# Patient Record
Sex: FEMALE | Race: WHITE | HISPANIC OR LATINO | Employment: UNEMPLOYED | URBAN - METROPOLITAN AREA
[De-identification: names, ages, dates, MRNs, and addresses within clinical notes are randomized per-mention and may not be internally consistent; named-entity substitution may affect disease eponyms.]

---

## 2018-02-25 ENCOUNTER — HOSPITAL ENCOUNTER (EMERGENCY)
Facility: MEDICAL CENTER | Age: 23
End: 2018-02-25
Attending: EMERGENCY MEDICINE

## 2018-02-25 ENCOUNTER — APPOINTMENT (OUTPATIENT)
Dept: RADIOLOGY | Facility: MEDICAL CENTER | Age: 23
End: 2018-02-25
Attending: EMERGENCY MEDICINE

## 2018-02-25 VITALS
WEIGHT: 217.81 LBS | HEIGHT: 66 IN | DIASTOLIC BLOOD PRESSURE: 80 MMHG | OXYGEN SATURATION: 100 % | SYSTOLIC BLOOD PRESSURE: 110 MMHG | BODY MASS INDEX: 35.01 KG/M2 | TEMPERATURE: 98 F | HEART RATE: 97 BPM | RESPIRATION RATE: 16 BRPM

## 2018-02-25 DIAGNOSIS — O09.90 AT HIGH RISK FOR COMPLICATIONS OF INTRAUTERINE PREGNANCY (IUP): ICD-10-CM

## 2018-02-25 DIAGNOSIS — O20.0 ABORTION, THREATENED: ICD-10-CM

## 2018-02-25 LAB
ALBUMIN SERPL BCP-MCNC: 4 G/DL (ref 3.2–4.9)
ALBUMIN/GLOB SERPL: 1.3 G/DL
ALP SERPL-CCNC: 64 U/L (ref 30–99)
ALT SERPL-CCNC: 12 U/L (ref 2–50)
ANION GAP SERPL CALC-SCNC: 8 MMOL/L (ref 0–11.9)
APPEARANCE UR: CLEAR
AST SERPL-CCNC: 15 U/L (ref 12–45)
B-HCG SERPL-ACNC: 2746 MIU/ML (ref 0–5)
BACTERIA #/AREA URNS HPF: NEGATIVE /HPF
BASOPHILS # BLD AUTO: 0.4 % (ref 0–1.8)
BASOPHILS # BLD: 0.05 K/UL (ref 0–0.12)
BILIRUB SERPL-MCNC: 0.2 MG/DL (ref 0.1–1.5)
BILIRUB UR QL STRIP.AUTO: NEGATIVE
BUN SERPL-MCNC: 12 MG/DL (ref 8–22)
CALCIUM SERPL-MCNC: 9.1 MG/DL (ref 8.5–10.5)
CHLORIDE SERPL-SCNC: 100 MMOL/L (ref 96–112)
CO2 SERPL-SCNC: 27 MMOL/L (ref 20–33)
COLOR UR: YELLOW
CREAT SERPL-MCNC: 0.6 MG/DL (ref 0.5–1.4)
EOSINOPHIL # BLD AUTO: 0.28 K/UL (ref 0–0.51)
EOSINOPHIL NFR BLD: 2.4 % (ref 0–6.9)
EPI CELLS #/AREA URNS HPF: ABNORMAL /HPF
ERYTHROCYTE [DISTWIDTH] IN BLOOD BY AUTOMATED COUNT: 43.6 FL (ref 35.9–50)
GLOBULIN SER CALC-MCNC: 3.2 G/DL (ref 1.9–3.5)
GLUCOSE SERPL-MCNC: 100 MG/DL (ref 65–99)
GLUCOSE UR STRIP.AUTO-MCNC: NEGATIVE MG/DL
HCT VFR BLD AUTO: 44.1 % (ref 37–47)
HGB BLD-MCNC: 14.6 G/DL (ref 12–16)
HYALINE CASTS #/AREA URNS LPF: ABNORMAL /LPF
IMM GRANULOCYTES # BLD AUTO: 0.04 K/UL (ref 0–0.11)
IMM GRANULOCYTES NFR BLD AUTO: 0.3 % (ref 0–0.9)
KETONES UR STRIP.AUTO-MCNC: NEGATIVE MG/DL
LEUKOCYTE ESTERASE UR QL STRIP.AUTO: ABNORMAL
LYMPHOCYTES # BLD AUTO: 3.82 K/UL (ref 1–4.8)
LYMPHOCYTES NFR BLD: 32.4 % (ref 22–41)
MCH RBC QN AUTO: 29.8 PG (ref 27–33)
MCHC RBC AUTO-ENTMCNC: 33.1 G/DL (ref 33.6–35)
MCV RBC AUTO: 90 FL (ref 81.4–97.8)
MICRO URNS: ABNORMAL
MONOCYTES # BLD AUTO: 0.73 K/UL (ref 0–0.85)
MONOCYTES NFR BLD AUTO: 6.2 % (ref 0–13.4)
NEUTROPHILS # BLD AUTO: 6.88 K/UL (ref 2–7.15)
NEUTROPHILS NFR BLD: 58.3 % (ref 44–72)
NITRITE UR QL STRIP.AUTO: NEGATIVE
NRBC # BLD AUTO: 0 K/UL
NRBC BLD-RTO: 0 /100 WBC
NUMBER OF RH DOSES IND 8505RD: NORMAL
PH UR STRIP.AUTO: 7 [PH]
PLATELET # BLD AUTO: 262 K/UL (ref 164–446)
PMV BLD AUTO: 9.7 FL (ref 9–12.9)
POTASSIUM SERPL-SCNC: 4.3 MMOL/L (ref 3.6–5.5)
PROT SERPL-MCNC: 7.2 G/DL (ref 6–8.2)
PROT UR QL STRIP: NEGATIVE MG/DL
RBC # BLD AUTO: 4.9 M/UL (ref 4.2–5.4)
RBC # URNS HPF: ABNORMAL /HPF
RBC UR QL AUTO: NEGATIVE
RH BLD: NORMAL
SODIUM SERPL-SCNC: 135 MMOL/L (ref 135–145)
SP GR UR STRIP.AUTO: 1.01
UROBILINOGEN UR STRIP.AUTO-MCNC: 0.2 MG/DL
WBC # BLD AUTO: 11.8 K/UL (ref 4.8–10.8)
WBC #/AREA URNS HPF: ABNORMAL /HPF

## 2018-02-25 PROCEDURE — 81001 URINALYSIS AUTO W/SCOPE: CPT

## 2018-02-25 PROCEDURE — 76817 TRANSVAGINAL US OBSTETRIC: CPT

## 2018-02-25 PROCEDURE — 86901 BLOOD TYPING SEROLOGIC RH(D): CPT

## 2018-02-25 PROCEDURE — 36415 COLL VENOUS BLD VENIPUNCTURE: CPT

## 2018-02-25 PROCEDURE — 80053 COMPREHEN METABOLIC PANEL: CPT

## 2018-02-25 PROCEDURE — 84702 CHORIONIC GONADOTROPIN TEST: CPT

## 2018-02-25 PROCEDURE — 85025 COMPLETE CBC W/AUTO DIFF WBC: CPT

## 2018-02-25 PROCEDURE — 99284 EMERGENCY DEPT VISIT MOD MDM: CPT

## 2018-02-25 ASSESSMENT — LIFESTYLE VARIABLES: DO YOU DRINK ALCOHOL: NO

## 2018-02-25 NOTE — DISCHARGE INSTRUCTIONS
Threatened Miscarriage  A threatened miscarriage occurs when you have vaginal bleeding during your first 20 weeks of pregnancy but the pregnancy has not ended. If you have vaginal bleeding during this time, your health care provider will do tests to make sure you are still pregnant. If the tests show you are still pregnant and the developing baby (fetus) inside your womb (uterus) is still growing, your condition is considered a threatened miscarriage.  A threatened miscarriage does not mean your pregnancy will end, but it does increase the risk of losing your pregnancy (complete miscarriage).  CAUSES   The cause of a threatened miscarriage is usually not known. If you go on to have a complete miscarriage, the most common cause is an abnormal number of chromosomes in the developing baby. Chromosomes are the structures inside cells that hold all your genetic material.  Some causes of vaginal bleeding that do not result in miscarriage include:  · Having sex.  · Having an infection.  · Normal hormone changes of pregnancy.  · Bleeding that occurs when an egg implants in your uterus.  RISK FACTORS  Risk factors for bleeding in early pregnancy include:  · Obesity.  · Smoking.  · Drinking excessive amounts of alcohol or caffeine.  · Recreational drug use.  SIGNS AND SYMPTOMS  · Light vaginal bleeding.  · Mild abdominal pain or cramps.  DIAGNOSIS   If you have bleeding with or without abdominal pain before 20 weeks of pregnancy, your health care provider will do tests to check whether you are still pregnant. One important test involves using sound waves and a computer (ultrasound) to create images of the inside of your uterus. Other tests include an internal exam of your vagina and uterus (pelvic exam) and measurement of your baby's heart rate.   You may be diagnosed with a threatened miscarriage if:  · Ultrasound testing shows you are still pregnant.  · Your baby's heart rate is strong.  · A pelvic exam shows that the  opening between your uterus and your vagina (cervix) is closed.  · Your heart rate and blood pressure are stable.  · Blood tests confirm you are still pregnant.  TREATMENT   No treatments have been shown to prevent a threatened miscarriage from going on to a complete miscarriage. However, the right home care is important.   HOME CARE INSTRUCTIONS   · Make sure you keep all your appointments for prenatal care. This is very important.  · Get plenty of rest.  · Do not have sex or use tampons if you have vaginal bleeding.  · Do not douche.  · Do not smoke or use recreational drugs.  · Do not drink alcohol.  · Avoid caffeine.  SEEK MEDICAL CARE IF:  · You have light vaginal bleeding or spotting while pregnant.  · You have abdominal pain or cramping.  · You have a fever.  SEEK IMMEDIATE MEDICAL CARE IF:  · You have heavy vaginal bleeding.  · You have blood clots coming from your vagina.  · You have severe low back pain or abdominal cramps.  · You have fever, chills, and severe abdominal pain.  MAKE SURE YOU:  · Understand these instructions.  · Will watch your condition.  · Will get help right away if you are not doing well or get worse.     This information is not intended to replace advice given to you by your health care provider. Make sure you discuss any questions you have with your health care provider.     Document Released: 2006 Document Revised: 2014 Document Reviewed: 10/14/2014  [employer] Interactive Patient Education ©6 [employer]    Pelvic Rest  Pelvic rest is sometimes recommended for women when:   · The placenta is partially or completely covering the opening of the cervix (placenta previa).  · There is bleeding between the uterine wall and the amniotic sac in the first trimester (subchorionic hemorrhage).  · The cervix begins to open without labor starting (incompetent cervix, cervical insufficiency).  · The labor is too early ( labor).  HOME CARE INSTRUCTIONS  · Do not have sexual  intercourse, stimulation, or an orgasm.  · Do not use tampons, douche, or put anything in the vagina.  · Do not lift anything over 10 pounds (4.5 kg).  · Avoid strenuous activity or straining your pelvic muscles.  SEEK MEDICAL CARE IF:   · You have any vaginal bleeding during pregnancy. Treat this as a potential emergency.  · You have cramping pain felt low in the stomach (stronger than menstrual cramps).  · You notice vaginal discharge (watery, mucus, or bloody).  · You have a low, dull backache.  · There are regular contractions or uterine tightening.  SEEK IMMEDIATE MEDICAL CARE IF:  You have vaginal bleeding and have placenta previa.      This information is not intended to replace advice given to you by your health care provider. Make sure you discuss any questions you have with your health care provider.     Document Released: 04/13/2012 Document Revised: 03/11/2013 Document Reviewed: 04/13/2012  [employer] Interactive Patient Education ©2016 [employer]

## 2018-02-25 NOTE — ED NOTES
Pt reporting cramps, spotting. Took pregnancy test yesterday which was positive. LMP 1/18/2018.   Call light within reach. Will continue to monitor.

## 2018-02-25 NOTE — ED TRIAGE NOTES
"Chief Complaint   Patient presents with   • Pregnancy     found out she's pregnant yesterday from home tests   • Cramps     started yesterday   • Spotting     Pt ambulatory to triage w/ no assistance. Pt does not appear in distress. Pt states she is . Pt placed back in lobby at this time.    Blood pressure 143/98, pulse 99, temperature 36.7 °C (98 °F), resp. rate 18, height 1.676 m (5' 6\"), weight 98.8 kg (217 lb 13 oz), SpO2 98 %, unknown if currently breastfeeding.      "

## 2018-02-25 NOTE — ED PROVIDER NOTES
ED Provider Note    Scribed for Anthony uD M.D. by Tucker Sood. 2018, 2:08 AM.    Primary care provider: Pcp Pt States None  Means of arrival: Walk-In  History obtained from: Patient  History limited by: None    CHIEF COMPLAINT  Chief Complaint   Patient presents with   • Pregnancy     found out she's pregnant yesterday from home tests   • Cramps     started yesterday   • Spotting     HPI  Sharyn Peguero is a 22 y.o. female who presents to the Emergency Department complaining of abdominal cramping onset 1 day ago. The patient's pregnancy is normally on the 18 so she was concerned and took her pregnancy test yesterday night and discovered she is pregnant. Patient started to develop spotting today and is still complaining of cramping. She used Tylenol earlier which did provide mild relief. Her periods were previously regular for the past few months. She has 3 kids and is . The patient had an issue during her last pregnancy where she was really itchy and labor was induced 2 weeks early without issues. Her IUD was removed 3 weeks ago and has had unprotected sex since then without any birth control. She does not currently follow-up with OB-GYN but [facility].    REVIEW OF SYSTEMS  See HPI for further details. All other systems are negative.    C.    PAST MEDICAL HISTORY   has a past medical history of Depression (); Migraine (never Dx'd); Migraine (); N&V (nausea and vomiting); Ovarian cyst (13); and Uterine size-date discrepancy (2011).    SURGICAL HISTORY  patient denies any surgical history    SOCIAL HISTORY  Social History   Substance Use Topics   • Smoking status: Never Smoker   • Smokeless tobacco: Not on file   • Alcohol use No      History   Drug Use No     FAMILY HISTORY  Family History   Problem Relation Age of Onset   • Diabetes Mother      insulin   • Hyperlipidemia Father    • Hypertension Father    • Hypertension Maternal Aunt    • Diabetes  "Maternal Aunt      pills   • Diabetes Maternal Grandmother      pills   • Hypertension Paternal Grandmother    • Hyperlipidemia Paternal Grandmother      CURRENT MEDICATIONS  Reviewed.  See Encounter Summary.     ALLERGIES  Allergies   Allergen Reactions   • Nkda [No Known Drug Allergy]      PHYSICAL EXAM  VITAL SIGNS: /98   Pulse 99   Temp 36.7 °C (98 °F)   Resp 18   Ht 1.676 m (5' 6\")   Wt 98.8 kg (217 lb 13 oz)   LMP 01/18/2017 (Exact Date)   SpO2 98%   BMI 35.16 kg/m²   Constitutional: Alert in mild apparent distress.  HENT: No signs of trauma, Bilateral external ears normal, Nose normal.   Eyes: Pupils are equal and reactive, Conjunctiva normal, Non-icteric.   Neck: Normal range of motion, No tenderness, Supple, No stridor.   Lymphatic: No lymphadenopathy noted.   Cardiovascular: Regular rate and rhythm, no murmurs.   Thorax & Lungs: Normal breath sounds, No respiratory distress, No wheezing, No chest tenderness.   Abdomen: Bowel sounds normal, Overweight, No tenderness, No masses, No pulsatile masses. No peritoneal signs.  Skin: Warm, Dry, No erythema, No rash.   Back: No bony tenderness, No CVA tenderness.   Extremities: Intact distal pulses, No edema, No tenderness, No cyanosis  Musculoskeletal: Good range of motion in all major joints. No tenderness to palpation or major deformities noted.   Neurologic: Alert , Normal motor function, Normal sensory function, No focal deficits noted.   Psychiatric: Affect normal, Judgment normal, Mood normal.     DIAGNOSTIC STUDIES / PROCEDURES     LABS  Results for orders placed or performed during the hospital encounter of 02/25/18   CBC WITH DIFFERENTIAL   Result Value Ref Range    WBC 11.8 (H) 4.8 - 10.8 K/uL    RBC 4.90 4.20 - 5.40 M/uL    Hemoglobin 14.6 12.0 - 16.0 g/dL    Hematocrit 44.1 37.0 - 47.0 %    MCV 90.0 81.4 - 97.8 fL    MCH 29.8 27.0 - 33.0 pg    MCHC 33.1 (L) 33.6 - 35.0 g/dL    RDW 43.6 35.9 - 50.0 fL    Platelet Count 262 164 - 446 K/uL    " MPV 9.7 9.0 - 12.9 fL    Neutrophils-Polys 58.30 44.00 - 72.00 %    Lymphocytes 32.40 22.00 - 41.00 %    Monocytes 6.20 0.00 - 13.40 %    Eosinophils 2.40 0.00 - 6.90 %    Basophils 0.40 0.00 - 1.80 %    Immature Granulocytes 0.30 0.00 - 0.90 %    Nucleated RBC 0.00 /100 WBC    Neutrophils (Absolute) 6.88 2.00 - 7.15 K/uL    Lymphs (Absolute) 3.82 1.00 - 4.80 K/uL    Monos (Absolute) 0.73 0.00 - 0.85 K/uL    Eos (Absolute) 0.28 0.00 - 0.51 K/uL    Baso (Absolute) 0.05 0.00 - 0.12 K/uL    Immature Granulocytes (abs) 0.04 0.00 - 0.11 K/uL    NRBC (Absolute) 0.00 K/uL   COMP METABOLIC PANEL   Result Value Ref Range    Sodium 135 135 - 145 mmol/L    Potassium 4.3 3.6 - 5.5 mmol/L    Chloride 100 96 - 112 mmol/L    Co2 27 20 - 33 mmol/L    Anion Gap 8.0 0.0 - 11.9    Glucose 100 (H) 65 - 99 mg/dL    Bun 12 8 - 22 mg/dL    Creatinine 0.60 0.50 - 1.40 mg/dL    Calcium 9.1 8.5 - 10.5 mg/dL    AST(SGOT) 15 12 - 45 U/L    ALT(SGPT) 12 2 - 50 U/L    Alkaline Phosphatase 64 30 - 99 U/L    Total Bilirubin 0.2 0.1 - 1.5 mg/dL    Albumin 4.0 3.2 - 4.9 g/dL    Total Protein 7.2 6.0 - 8.2 g/dL    Globulin 3.2 1.9 - 3.5 g/dL    A-G Ratio 1.3 g/dL   URINALYSIS (UA)   Result Value Ref Range    Color Yellow     Character Clear     Specific Gravity 1.009 <1.035    Ph 7.0 5.0 - 8.0    Glucose Negative Negative mg/dL    Ketones Negative Negative mg/dL    Protein Negative Negative mg/dL    Bilirubin Negative Negative    Urobilinogen, Urine 0.2 Negative    Nitrite Negative Negative    Leukocyte Esterase Small (A) Negative    Occult Blood Negative Negative    Micro Urine Req Microscopic    RH TYPE FOR RHOGAM FROM E.D.   Result Value Ref Range    Emergency Department Rh Typing POS     Number Of Rh Doses Indicated ZERO    HCG QUANTITATIVE SERUM   Result Value Ref Range    Bhcg 2746.0 (H) 0.0 - 5.0 mIU/mL   ESTIMATED GFR   Result Value Ref Range    GFR If African American >60 >60 mL/min/1.73 m 2    GFR If Non African American >60 >60 mL/min/1.73  m 2   URINE MICROSCOPIC (W/UA)   Result Value Ref Range    WBC 5-10 (A) /hpf    RBC 0-2 /hpf    Bacteria Negative None /hpf    Epithelial Cells Few /hpf    Hyaline Cast 0-2 /lpf     All labs were reviewed by me.    RADIOLOGY  US-OB PELVIS TRANSVAGINAL   Final Result      1.  Single intrauterine gestational sac containing a yolk sac. No embryonic pole identified.      2.  Menstrual age based on mean sac diameter is 5 weeks 3 days with ultrasound K[facility]A 10/25/2018. Follow-up ultrasound surveillance is recommended to confirm living IUP.      3.  Normal appearance of each ovary.      4.  No adnexal mass or free fluid.        The radiologist's interpretation of all radiological studies and images have been reviewed by me.    COURSE & MEDICAL DECISION MAKING  Pertinent Labs & Imaging studies reviewed. (See chart for details)    Differential diagnoses include but are not limited to: Early Pregnancy, IUP vs. Ectopic vs. Threatened     2:08 AM - Patient seen and examined at bedside. Ordered RH Type, HCG Quantitative Serum, CBC, CMP, Urinalysis to evaluate her symptoms.     Decision Making:  This is a 22 y.o. year old female who presents with above complaint. By definition the patient does have a threatened miscarriage. She denies having a need for serial ultrasound and beta hCGs to trend. She is understanding of pelvic rest. She is understanding of return precautions here to the ER including that for ability of serial testing. Patient has been referred back to pregnancy center where she has previously been seen for her ObGyn care needs.    FINAL IMPRESSION  1. , threatened    2. At high risk for complications of intrauterine pregnancy (IUP)          Tucker GRACE ([place]), am scribing for, and in the presence of, Anthony Du M.D..    Electronically signed by: Tucker Sood ([place]), 2018    Anthony GRACE M.D. personally performed the services described in this documentation, as scribed by  Tucker Sood in my presence, and it is both accurate and complete.    The note accurately reflects work and decisions made by me.  Anthony Du  2/26/2018  4:08 AM

## 2018-02-25 NOTE — ED NOTES
Pt up to restroom with steady gait. Obtained urine sample and sent to lab. Will continue to monitor.

## 2018-03-02 ENCOUNTER — APPOINTMENT (OUTPATIENT)
Dept: OBGYN | Facility: CLINIC | Age: 23
End: 2018-03-02

## 2018-03-30 ENCOUNTER — INITIAL PRENATAL (OUTPATIENT)
Dept: OBGYN | Facility: CLINIC | Age: 23
End: 2018-03-30

## 2018-03-30 ENCOUNTER — HOSPITAL ENCOUNTER (OUTPATIENT)
Facility: MEDICAL CENTER | Age: 23
End: 2018-03-30
Attending: NURSE PRACTITIONER
Payer: COMMERCIAL

## 2018-03-30 VITALS
WEIGHT: 231 LBS | HEIGHT: 66 IN | SYSTOLIC BLOOD PRESSURE: 112 MMHG | BODY MASS INDEX: 37.12 KG/M2 | DIASTOLIC BLOOD PRESSURE: 70 MMHG

## 2018-03-30 DIAGNOSIS — Z34.81 ENCOUNTER FOR SUPERVISION OF OTHER NORMAL PREGNANCY, FIRST TRIMESTER: ICD-10-CM

## 2018-03-30 DIAGNOSIS — Z34.81 ENCOUNTER FOR SUPERVISION OF OTHER NORMAL PREGNANCY, FIRST TRIMESTER: Primary | ICD-10-CM

## 2018-03-30 DIAGNOSIS — O09.291 HISTORY OF MACROSOMIA IN INFANT IN PRIOR PREGNANCY, CURRENTLY PREGNANT IN FIRST TRIMESTER: ICD-10-CM

## 2018-03-30 PROCEDURE — 59401 PR NEW OB VISIT: CPT | Performed by: NURSE PRACTITIONER

## 2018-03-30 ASSESSMENT — PATIENT HEALTH QUESTIONNAIRE - PHQ9
5. POOR APPETITE OR OVEREATING: 1 - SEVERAL DAYS
CLINICAL INTERPRETATION OF PHQ2 SCORE: 1

## 2018-03-30 NOTE — PATIENT INSTRUCTIONS
P:  1.  GC/CT done. Pap records requested, WNL per pt 2017.         2.  Prenatal labs, including 1hr GTT ordered - lab slip given        3.  Discussed PNV, diet, and adequate water intake        4.  NOB packet given        5.  Return to office in 4 wks        6.  First trimester screening declined.         7.  Flu vaccine declined.         8.  Complete OB US in 10wks

## 2018-03-30 NOTE — PROGRESS NOTES
NOB visit today  Ob f/u.    No VB, LOF or contractions  Yellowish discharge. Pt denies odor/burning/itching  C/O cramping x 1 week   Phone number # [phone]  Pharmacy verified with patient  WT=   231 lbs           MM=170/70  Pt is not working, not around chemical or lifting  Baby wasn't planned. FOB is not involved. Mom is happy with the baby   AFP ordered today, PNP

## 2018-03-30 NOTE — PROGRESS NOTES
"S:  Sharyn Mejias is a 22 y.o.   @ EGA: 10w1d KIRA: Estimated Date of Delivery: 10/25/18  per US who presents for her new OB exam.  She has no complaints.  Desires AFP.  Declines CF.  Reports no FM, VB, LOF, or cramping.  Denies dysuria, vaginal DC.  Pt is single and lives with friend and children. Not presently working outside the home.  Pregnancy is unplanned but wanted.  Declines Centering Pregnancy.    O:    Vitals:    18 1253   BP: 112/70   Weight: 104.8 kg (231 lb)   Height: 1.676 m (5' 6\")    See H&P Prenatal Physical.  Wet mount: deferred        FHTs: 160, +CM and +FM on BSUS        Fundal ht: 10     A:   1.  IUP @ 10w1d KIRA: Estimated Date of Delivery: 10/25/18 per [facility]         2.  S=D        3.    Patient Active Problem List    Diagnosis Date Noted   • Encounter for supervision of other normal pregnancy, first trimester 2018   • History of macrosomia in infant in prior pregnancy, currently pregnant in first trimester 2018   • Depression 2012   • Migraine          P:  1.  GC/CT done. Pap records requested, WNL per pt 2017.         2.  Prenatal labs, including 1hr GTT ordered - lab slip given        3.  Discussed PNV, diet, and adequate water intake        4.  NOB packet given        5.  Return to office in 4 wks        6.  First trimester screening declined.         7.  Flu vaccine declined.         8.  Complete OB US in 10wks    "

## 2018-03-31 LAB
C TRACH DNA SPEC QL NAA+PROBE: POSITIVE
N GONORRHOEA DNA SPEC QL NAA+PROBE: NEGATIVE
SPECIMEN SOURCE: ABNORMAL

## 2018-04-02 ENCOUNTER — TELEPHONE (OUTPATIENT)
Dept: OBGYN | Facility: CLINIC | Age: 23
End: 2018-04-02

## 2018-04-02 DIAGNOSIS — A74.9 CHLAMYDIA: ICD-10-CM

## 2018-04-02 RX ORDER — AZITHROMYCIN 500 MG/1
1000 TABLET, FILM COATED ORAL ONCE
Qty: 2 TAB | Refills: 0 | Status: SHIPPED | OUTPATIENT
Start: 2018-04-02 | End: 2018-04-02

## 2018-04-02 NOTE — TELEPHONE ENCOUNTER
Notes Recorded by VINITA Conrad on 4/2/2018 at 8:10 AM PDT  +Chlamydia, no pharmacy on file will need to come in and  RX for azithromycin, also have patient inform partner for follow up at [facility]    Unable to contact pt msg left to call back with Mariama.    4/3/18 @ 3330  Pt notified regarding positive chlamydia and need to  Rx from her pharmacy and Advised pt make a note of the date she took medication because she needs to be retested 4 wks after she had taken meds. Advised for her partner to be treated with his PCP or [facility]. Pt verbalized understanding.   [facility] form and lab results faxed to Karen at [facility].

## 2018-04-03 RX ORDER — AZITHROMYCIN 500 MG/1
500 TABLET, FILM COATED ORAL ONCE
Qty: 2 TAB | Refills: 0 | Status: SHIPPED | OUTPATIENT
Start: 2018-04-03 | End: 2018-04-03

## 2018-04-04 ENCOUNTER — TELEPHONE (OUTPATIENT)
Dept: OBGYN | Facility: CLINIC | Age: 23
End: 2018-04-04

## 2018-04-04 NOTE — TELEPHONE ENCOUNTER
Pt came in to clinic today, states she doesn't have money to get medication for chlamydia, states she called [facility] and was told they can not treat her because she is our pt, states why she can not get treated since they always get treatment for free. I called Karen @ [facility] and let msg with pt situation.    Pt states that she was going go back to [facility] to see if she gets treated.

## 2018-04-16 ENCOUNTER — HOSPITAL ENCOUNTER (EMERGENCY)
Facility: MEDICAL CENTER | Age: 23
End: 2018-04-16
Attending: EMERGENCY MEDICINE

## 2018-04-16 ENCOUNTER — APPOINTMENT (OUTPATIENT)
Dept: RADIOLOGY | Facility: MEDICAL CENTER | Age: 23
End: 2018-04-16
Attending: EMERGENCY MEDICINE

## 2018-04-16 VITALS
SYSTOLIC BLOOD PRESSURE: 112 MMHG | HEART RATE: 90 BPM | BODY MASS INDEX: 37.22 KG/M2 | RESPIRATION RATE: 16 BRPM | TEMPERATURE: 97.9 F | DIASTOLIC BLOOD PRESSURE: 76 MMHG | OXYGEN SATURATION: 99 % | WEIGHT: 230.6 LBS

## 2018-04-16 DIAGNOSIS — N93.9 VAGINAL BLEEDING: ICD-10-CM

## 2018-04-16 DIAGNOSIS — O20.0 THREATENED MISCARRIAGE: ICD-10-CM

## 2018-04-16 LAB
ALBUMIN SERPL BCP-MCNC: 3.5 G/DL (ref 3.2–4.9)
ALBUMIN/GLOB SERPL: 1 G/DL
ALP SERPL-CCNC: 56 U/L (ref 30–99)
ALT SERPL-CCNC: 13 U/L (ref 2–50)
ANION GAP SERPL CALC-SCNC: 7 MMOL/L (ref 0–11.9)
APPEARANCE UR: ABNORMAL
AST SERPL-CCNC: 14 U/L (ref 12–45)
B-HCG SERPL-ACNC: ABNORMAL MIU/ML (ref 0–5)
BACTERIA #/AREA URNS HPF: ABNORMAL /HPF
BASOPHILS # BLD AUTO: 0.4 % (ref 0–1.8)
BASOPHILS # BLD: 0.04 K/UL (ref 0–0.12)
BILIRUB SERPL-MCNC: 0.3 MG/DL (ref 0.1–1.5)
BILIRUB UR QL STRIP.AUTO: NEGATIVE
BUN SERPL-MCNC: 10 MG/DL (ref 8–22)
CALCIUM SERPL-MCNC: 9.2 MG/DL (ref 8.5–10.5)
CHLORIDE SERPL-SCNC: 104 MMOL/L (ref 96–112)
CO2 SERPL-SCNC: 25 MMOL/L (ref 20–33)
COLOR UR: YELLOW
CREAT SERPL-MCNC: 0.51 MG/DL (ref 0.5–1.4)
CULTURE IF INDICATED INDCX: YES UA CULTURE
EOSINOPHIL # BLD AUTO: 0.18 K/UL (ref 0–0.51)
EOSINOPHIL NFR BLD: 1.9 % (ref 0–6.9)
EPI CELLS #/AREA URNS HPF: ABNORMAL /HPF
ERYTHROCYTE [DISTWIDTH] IN BLOOD BY AUTOMATED COUNT: 42.4 FL (ref 35.9–50)
GLOBULIN SER CALC-MCNC: 3.4 G/DL (ref 1.9–3.5)
GLUCOSE SERPL-MCNC: 73 MG/DL (ref 65–99)
GLUCOSE UR STRIP.AUTO-MCNC: NEGATIVE MG/DL
HCT VFR BLD AUTO: 42.7 % (ref 37–47)
HGB BLD-MCNC: 14.1 G/DL (ref 12–16)
HYALINE CASTS #/AREA URNS LPF: ABNORMAL /LPF
IMM GRANULOCYTES # BLD AUTO: 0.03 K/UL (ref 0–0.11)
IMM GRANULOCYTES NFR BLD AUTO: 0.3 % (ref 0–0.9)
INR PPP: 0.92 (ref 0.87–1.13)
KETONES UR STRIP.AUTO-MCNC: NEGATIVE MG/DL
LEUKOCYTE ESTERASE UR QL STRIP.AUTO: ABNORMAL
LYMPHOCYTES # BLD AUTO: 2.23 K/UL (ref 1–4.8)
LYMPHOCYTES NFR BLD: 24 % (ref 22–41)
MCH RBC QN AUTO: 29.3 PG (ref 27–33)
MCHC RBC AUTO-ENTMCNC: 33 G/DL (ref 33.6–35)
MCV RBC AUTO: 88.6 FL (ref 81.4–97.8)
MICRO URNS: ABNORMAL
MONOCYTES # BLD AUTO: 0.42 K/UL (ref 0–0.85)
MONOCYTES NFR BLD AUTO: 4.5 % (ref 0–13.4)
NEUTROPHILS # BLD AUTO: 6.4 K/UL (ref 2–7.15)
NEUTROPHILS NFR BLD: 68.9 % (ref 44–72)
NITRITE UR QL STRIP.AUTO: NEGATIVE
NRBC # BLD AUTO: 0 K/UL
NRBC BLD-RTO: 0 /100 WBC
PH UR STRIP.AUTO: 6 [PH]
PLATELET # BLD AUTO: 256 K/UL (ref 164–446)
PMV BLD AUTO: 10.4 FL (ref 9–12.9)
POTASSIUM SERPL-SCNC: 3.8 MMOL/L (ref 3.6–5.5)
PROT SERPL-MCNC: 6.9 G/DL (ref 6–8.2)
PROT UR QL STRIP: NEGATIVE MG/DL
PROTHROMBIN TIME: 12.1 SEC (ref 12–14.6)
RBC # BLD AUTO: 4.82 M/UL (ref 4.2–5.4)
RBC # URNS HPF: ABNORMAL /HPF
RBC UR QL AUTO: NEGATIVE
SODIUM SERPL-SCNC: 136 MMOL/L (ref 135–145)
SP GR UR STRIP.AUTO: 1.02
UROBILINOGEN UR STRIP.AUTO-MCNC: 0.2 MG/DL
WBC # BLD AUTO: 9.3 K/UL (ref 4.8–10.8)
WBC #/AREA URNS HPF: ABNORMAL /HPF

## 2018-04-16 PROCEDURE — 81001 URINALYSIS AUTO W/SCOPE: CPT

## 2018-04-16 PROCEDURE — 80053 COMPREHEN METABOLIC PANEL: CPT

## 2018-04-16 PROCEDURE — 84702 CHORIONIC GONADOTROPIN TEST: CPT

## 2018-04-16 PROCEDURE — 99284 EMERGENCY DEPT VISIT MOD MDM: CPT

## 2018-04-16 PROCEDURE — 76817 TRANSVAGINAL US OBSTETRIC: CPT

## 2018-04-16 PROCEDURE — 85610 PROTHROMBIN TIME: CPT

## 2018-04-16 PROCEDURE — 85025 COMPLETE CBC W/AUTO DIFF WBC: CPT

## 2018-04-16 PROCEDURE — 87086 URINE CULTURE/COLONY COUNT: CPT

## 2018-04-16 ASSESSMENT — PAIN SCALES - GENERAL: PAINLEVEL_OUTOF10: 8

## 2018-04-16 NOTE — ED PROVIDER NOTES
ED Provider Note  CHIEF COMPLAINT  Vaginal bleeding and lower abdominal cramps.    HPI  Sharyn Mejias is a 22 y.o. female who presents for evaluation of lower abdominal cramps and vaginal bleeding. She is pregnant. She's had an evaluation for the same issue several weeks ago. She has an intrauterine pregnancy at that time of about 5 weeks. She is being followed at the [facility]. No headache, no chest pain, no difficulty breathing. She has a good appetite. I reviewed her previous records and her Rh is positive. She was pregnant on last visit several weeks ago. She was 5 weeks pregnant at that time.    REVIEW OF SYSTEMS  No headache, no chest pain, no difficulty breathing.  ALL OTHER SYSTEMS NEGATIVE    ALLERGIES  Allergies   Allergen Reactions   • Nkda [No Known Drug Allergy]        CURRENT MEDICATIONS  No current medication    PAST MEDICAL HISTORY  Past Medical History:   Diagnosis Date   • Depression 2012    postpartum depression   • Migraine never Dx'd    Tylenol - helps sometimes butnot always.   • Migraine 2010    during pregnancy    • N&V (nausea and vomiting)    • Ovarian cyst 6/25/13   • Uterine size-date discrepancy 8/31/2011       FAMILY HISTORY  Family History   Problem Relation Age of Onset   • Diabetes Mother      insulin   • Hyperlipidemia Father    • Hypertension Father    • Hypertension Maternal Aunt    • Diabetes Maternal Aunt      pills   • Diabetes Maternal Grandmother      pills   • Hypertension Paternal Grandmother    • Hyperlipidemia Paternal Grandmother        SOCIAL HISTORY   at the bedside.    PHYSICAL EXAM  GENERAL: Alert obese  female  VITAL SIGNS: /69   Pulse 91   Temp 36.6 °C (97.9 °F) (Temporal)   Resp 16   Wt 104.6 kg (230 lb 9.6 oz)   LMP 01/18/2017 (Exact Date)   SpO2 100%   BMI 37.22 kg/m²   Constitutional: Alert obese  female adult   HENT: Scalp is normal size and nontender. Ears are clear. Nose is clear. Throat is  clear with no stridor no drooling no trismus. Teeth are all intact.  Eyes: Pupils equal round and reactive to light, extraocular motor fall. There is no scleral icterus.  Neck: Neck is supple and nontender. There is no meningismus. No adenitis. No thyromegaly.  Lymphatic: No adenopathy.   Cardiovascular: Heart regular rhythm without murmurs or gallops   Thorax & Lungs: No chest wall tenderness. Lungs are clear. Patient has good breath sounds bilateral. No rales, no rhonchi, no wheezes.  Abdomen: Abdomen is soft, nontender, not rigid, no guarding, and no organomegaly. There is no palpable hernia   Skin: Warm, pink, and dry with no erythema and no rash.   Back: Nontender, no midline bony tenderness, no flank tenderness.  Pelvic examination: Cervix is soft. The uterus is about 12 weeks size. No adnexal tenderness. No uterine tenderness. No vaginal blood. Pelvic exam was performed with the nurse in attendance at the bedside.  Extremities: Full range of motion  No tenderness to palpation and no deformities noted. No calf or thigh swelling. No calf or thigh tenderness. No clinical DVT.  Neurologic: Alert & oriented . Cranial nerves are grossly intact as tested. Patient moves all 4 extremities well. Patient has good strong flexion and extension of the ankle joints knee joints hip joints and elbow joints. Sensation is normal and symmetrical in the upper and lower extremities.   Psychiatric: Patient is alert oriented coherent and rational.     RADIOLOGY/PROCEDURES  US-OB PELVIS TRANSVAGINAL   Final Result      1.  Single living intrauterine pregnancy at 12 weeks 2 days gestation by ultrasound.      2.  Small subchorionic hemorrhage.            COURSE & MEDICAL DECISION MAKING  She presents for evaluation of lower abdominal cramps and vaginal bleeding. She is pregnant. She was evaluated here several weeks ago and had a five-week intrauterine pregnancy. She is Rh+. Being followed at the pregnancy Center.    Plan: #1 IV #2  pelvic examinations with a nurse in attendance. #3 pelvic ultrasound #4. Lab evaluation including pelvic ultrasound, CBC, CMP, quantitative hCG, ProTime.    Laboratory and reexamination: Pelvic ultrasound shows a single live intrauterine pregnancy at 12 weeks. Small subchorionic hemorrhage. Urine shows large leukocyte esterase. CBC is normal with no anemia and no bandemia. Chemistry panel is normal.    Results for orders placed or performed during the hospital encounter of 04/16/18   URINALYSIS,CULTURE IF INDICATED   Result Value Ref Range    Color Yellow     Character Cloudy (A)     Specific Gravity 1.024 <1.035    Ph 6.0 5.0 - 8.0    Glucose Negative Negative mg/dL    Ketones Negative Negative mg/dL    Protein Negative Negative mg/dL    Bilirubin Negative Negative    Urobilinogen, Urine 0.2 Negative    Nitrite Negative Negative    Leukocyte Esterase Large (A) Negative    Occult Blood Negative Negative    Micro Urine Req Microscopic     Culture Indicated Yes UA Culture   URINE MICROSCOPIC (W/UA)   Result Value Ref Range    WBC Packed WBC /hpf    RBC 0-2 /hpf    Bacteria Many (A) None /hpf    Epithelial Cells Moderate (A) /hpf    Hyaline Cast 0-2 /lpf   CBC WITH DIFFERENTIAL   Result Value Ref Range    WBC 9.3 4.8 - 10.8 K/uL    RBC 4.82 4.20 - 5.40 M/uL    Hemoglobin 14.1 12.0 - 16.0 g/dL    Hematocrit 42.7 37.0 - 47.0 %    MCV 88.6 81.4 - 97.8 fL    MCH 29.3 27.0 - 33.0 pg    MCHC 33.0 (L) 33.6 - 35.0 g/dL    RDW 42.4 35.9 - 50.0 fL    Platelet Count 256 164 - 446 K/uL    MPV 10.4 9.0 - 12.9 fL    Neutrophils-Polys 68.90 44.00 - 72.00 %    Lymphocytes 24.00 22.00 - 41.00 %    Monocytes 4.50 0.00 - 13.40 %    Eosinophils 1.90 0.00 - 6.90 %    Basophils 0.40 0.00 - 1.80 %    Immature Granulocytes 0.30 0.00 - 0.90 %    Nucleated RBC 0.00 /100 WBC    Neutrophils (Absolute) 6.40 2.00 - 7.15 K/uL    Lymphs (Absolute) 2.23 1.00 - 4.80 K/uL    Monos (Absolute) 0.42 0.00 - 0.85 K/uL    Eos (Absolute) 0.18 0.00 - 0.51 K/uL     Baso (Absolute) 0.04 0.00 - 0.12 K/uL    Immature Granulocytes (abs) 0.03 0.00 - 0.11 K/uL    NRBC (Absolute) 0.00 K/uL   COMP METABOLIC PANEL   Result Value Ref Range    Sodium 136 135 - 145 mmol/L    Potassium 3.8 3.6 - 5.5 mmol/L    Chloride 104 96 - 112 mmol/L    Co2 25 20 - 33 mmol/L    Anion Gap 7.0 0.0 - 11.9    Glucose 73 65 - 99 mg/dL    Bun 10 8 - 22 mg/dL    Creatinine 0.51 0.50 - 1.40 mg/dL    Calcium 9.2 8.5 - 10.5 mg/dL    AST(SGOT) 14 12 - 45 U/L    ALT(SGPT) 13 2 - 50 U/L    Alkaline Phosphatase 56 30 - 99 U/L    Total Bilirubin 0.3 0.1 - 1.5 mg/dL    Albumin 3.5 3.2 - 4.9 g/dL    Total Protein 6.9 6.0 - 8.2 g/dL    Globulin 3.4 1.9 - 3.5 g/dL    A-G Ratio 1.0 g/dL   PROTHROMBIN TIME (INR)   Result Value Ref Range    PT 12.1 12.0 - 14.6 sec    INR 0.92 0.87 - 1.13   ESTIMATED GFR   Result Value Ref Range    GFR If African American >60 >60 mL/min/1.73 m 2    GFR If Non African American >60 >60 mL/min/1.73 m 2      A she is stable for discharge. She'll follow up at the pregnancy Center tomorrow or later on this week. Stable for discharge. He be given instructions on threatened miscarriage.  FINAL IMPRESSION  1. Threatened miscarriage  2. 12 week intrauterine pregnancy         Electronically signed by: Gary Gansert, 4/16/2018 2:15 PM

## 2018-04-16 NOTE — ED TRIAGE NOTES
Amb to triage w/ c/o low abd cramping since yesterday.  Reports spotting yesterday, resolved today.  Pt also c/o back pain x 2 days.  Pt is 11wks pregnant.  .

## 2018-04-18 LAB
BACTERIA UR CULT: NORMAL
SIGNIFICANT IND 70042: NORMAL
SITE SITE: NORMAL
SOURCE SOURCE: NORMAL

## 2018-04-25 ENCOUNTER — HOSPITAL ENCOUNTER (OUTPATIENT)
Dept: LAB | Facility: MEDICAL CENTER | Age: 23
End: 2018-04-25
Attending: NURSE PRACTITIONER
Payer: COMMERCIAL

## 2018-04-25 DIAGNOSIS — O09.291 HISTORY OF MACROSOMIA IN INFANT IN PRIOR PREGNANCY, CURRENTLY PREGNANT IN FIRST TRIMESTER: ICD-10-CM

## 2018-04-25 DIAGNOSIS — Z34.81 ENCOUNTER FOR SUPERVISION OF OTHER NORMAL PREGNANCY, FIRST TRIMESTER: ICD-10-CM

## 2018-04-25 LAB
ABO GROUP BLD: NORMAL
APPEARANCE UR: ABNORMAL
BACTERIA #/AREA URNS HPF: ABNORMAL /HPF
BASOPHILS # BLD AUTO: 0.7 % (ref 0–1.8)
BASOPHILS # BLD: 0.06 K/UL (ref 0–0.12)
BILIRUB UR QL STRIP.AUTO: NEGATIVE
BLD GP AB SCN SERPL QL: NORMAL
COLOR UR: YELLOW
EOSINOPHIL # BLD AUTO: 0.21 K/UL (ref 0–0.51)
EOSINOPHIL NFR BLD: 2.6 % (ref 0–6.9)
EPI CELLS #/AREA URNS HPF: ABNORMAL /HPF
ERYTHROCYTE [DISTWIDTH] IN BLOOD BY AUTOMATED COUNT: 42.1 FL (ref 35.9–50)
GLUCOSE 1H P 50 G GLC PO SERPL-MCNC: 142 MG/DL (ref 70–139)
GLUCOSE UR STRIP.AUTO-MCNC: NEGATIVE MG/DL
HBV SURFACE AG SER QL: NEGATIVE
HCT VFR BLD AUTO: 44.2 % (ref 37–47)
HGB BLD-MCNC: 14.5 G/DL (ref 12–16)
HIV 1+2 AB+HIV1 P24 AG SERPL QL IA: NON REACTIVE
HYALINE CASTS #/AREA URNS LPF: ABNORMAL /LPF
IMM GRANULOCYTES # BLD AUTO: 0.03 K/UL (ref 0–0.11)
IMM GRANULOCYTES NFR BLD AUTO: 0.4 % (ref 0–0.9)
KETONES UR STRIP.AUTO-MCNC: ABNORMAL MG/DL
LEUKOCYTE ESTERASE UR QL STRIP.AUTO: ABNORMAL
LYMPHOCYTES # BLD AUTO: 2.36 K/UL (ref 1–4.8)
LYMPHOCYTES NFR BLD: 29.4 % (ref 22–41)
MCH RBC QN AUTO: 29.7 PG (ref 27–33)
MCHC RBC AUTO-ENTMCNC: 32.8 G/DL (ref 33.6–35)
MCV RBC AUTO: 90.6 FL (ref 81.4–97.8)
MICRO URNS: ABNORMAL
MONOCYTES # BLD AUTO: 0.41 K/UL (ref 0–0.85)
MONOCYTES NFR BLD AUTO: 5.1 % (ref 0–13.4)
NEUTROPHILS # BLD AUTO: 4.97 K/UL (ref 2–7.15)
NEUTROPHILS NFR BLD: 61.8 % (ref 44–72)
NITRITE UR QL STRIP.AUTO: NEGATIVE
NRBC # BLD AUTO: 0 K/UL
NRBC BLD-RTO: 0 /100 WBC
PH UR STRIP.AUTO: 6.5 [PH]
PLATELET # BLD AUTO: 296 K/UL (ref 164–446)
PMV BLD AUTO: 11 FL (ref 9–12.9)
PROT UR QL STRIP: 30 MG/DL
RBC # BLD AUTO: 4.88 M/UL (ref 4.2–5.4)
RBC # URNS HPF: ABNORMAL /HPF
RBC UR QL AUTO: NEGATIVE
RH BLD: NORMAL
RPR SER QL: NON REACTIVE
RUBV AB SER QL: 44.3 IU/ML
SP GR UR STRIP.AUTO: 1.03
TREPONEMA PALLIDUM IGG+IGM AB [PRESENCE] IN SERUM OR PLASMA BY IMMUNOASSAY: REACTIVE
UROBILINOGEN UR STRIP.AUTO-MCNC: 0.2 MG/DL
WBC # BLD AUTO: 8 K/UL (ref 4.8–10.8)
WBC #/AREA URNS HPF: ABNORMAL /HPF

## 2018-04-27 ENCOUNTER — HOSPITAL ENCOUNTER (OUTPATIENT)
Facility: MEDICAL CENTER | Age: 23
End: 2018-04-27
Attending: PHYSICIAN ASSISTANT
Payer: COMMERCIAL

## 2018-04-27 ENCOUNTER — ROUTINE PRENATAL (OUTPATIENT)
Dept: OBGYN | Facility: CLINIC | Age: 23
End: 2018-04-27

## 2018-04-27 VITALS — WEIGHT: 235 LBS | DIASTOLIC BLOOD PRESSURE: 70 MMHG | BODY MASS INDEX: 37.93 KG/M2 | SYSTOLIC BLOOD PRESSURE: 116 MMHG

## 2018-04-27 DIAGNOSIS — Z34.81 ENCOUNTER FOR SUPERVISION OF OTHER NORMAL PREGNANCY, FIRST TRIMESTER: Primary | ICD-10-CM

## 2018-04-27 DIAGNOSIS — R73.09 ELEVATED GLUCOSE TOLERANCE TEST: ICD-10-CM

## 2018-04-27 DIAGNOSIS — A74.9 CHLAMYDIA: ICD-10-CM

## 2018-04-27 DIAGNOSIS — O09.291 HISTORY OF MACROSOMIA IN INFANT IN PRIOR PREGNANCY, CURRENTLY PREGNANT IN FIRST TRIMESTER: ICD-10-CM

## 2018-04-27 PROCEDURE — 90040 PR PRENATAL FOLLOW UP: CPT | Performed by: PHYSICIAN ASSISTANT

## 2018-04-27 NOTE — PROGRESS NOTES
Pt has no complaints with cramping, bleeding or pain, though pt was seen in ER 4/16 due to spotting and cramping. US done confirms KIRA but pt sent home. No problems since. Pt is having continued HA, so strongly encouraged to incr water intake. No FM yet. Pt notes she took meds for Chlamydia infection, unknown if partner did as she isnt in contact with him anymore. JUANITA done today. PNL wnl - pt notified of results and positive treponemal test, but neg RPR, so no further testing needed. Early 1hr , so 3hr GTT and AFP slip given today - pt to do after 15wk. Also, pt informed she will need another 3hr GTT at 24wk if testing neg. US scheduled June 8. RTC 4 wk or sooner prn.

## 2018-04-27 NOTE — PROGRESS NOTES
Pt here today for OB follow up  WT: 235 lb  BP: 116/70  Pt states was seen at [facility] on 04/16 due to vaginal spotting and abdominal cramping. Pt states no spotting since but still having cramping.  Pt reports having heaches and migraines.   Pt aware that her 1 hr gtt labs results are elevated and need for the 3 hr gtt, instructions given to pt today.   Pt states she took medication for Chlmydia on 04/03/18. States she has not been sexually active.  US on 06/08  Good # [phone]

## 2018-04-28 DIAGNOSIS — A74.9 CHLAMYDIA: ICD-10-CM

## 2018-04-28 DIAGNOSIS — Z34.81 ENCOUNTER FOR SUPERVISION OF OTHER NORMAL PREGNANCY, FIRST TRIMESTER: ICD-10-CM

## 2018-04-28 LAB
C TRACH DNA SPEC QL NAA+PROBE: NEGATIVE
N GONORRHOEA DNA SPEC QL NAA+PROBE: NEGATIVE
SPECIMEN SOURCE: NORMAL

## 2018-04-29 LAB — T PALLIDUM AB SER QL AGGL: REACTIVE

## 2018-04-30 ENCOUNTER — HOSPITAL ENCOUNTER (EMERGENCY)
Facility: MEDICAL CENTER | Age: 23
End: 2018-04-30
Attending: EMERGENCY MEDICINE

## 2018-04-30 VITALS
RESPIRATION RATE: 18 BRPM | DIASTOLIC BLOOD PRESSURE: 66 MMHG | OXYGEN SATURATION: 97 % | SYSTOLIC BLOOD PRESSURE: 108 MMHG | TEMPERATURE: 97.2 F | WEIGHT: 237.22 LBS | BODY MASS INDEX: 39.52 KG/M2 | HEART RATE: 91 BPM | HEIGHT: 65 IN

## 2018-04-30 DIAGNOSIS — R51.9 ACUTE NONINTRACTABLE HEADACHE, UNSPECIFIED HEADACHE TYPE: ICD-10-CM

## 2018-04-30 DIAGNOSIS — N30.90 CYSTITIS: ICD-10-CM

## 2018-04-30 LAB
APPEARANCE UR: CLEAR
BACTERIA #/AREA URNS HPF: ABNORMAL /HPF
BILIRUB UR QL STRIP.AUTO: NEGATIVE
COLOR UR: YELLOW
EPI CELLS #/AREA URNS HPF: ABNORMAL /HPF
GLUCOSE UR STRIP.AUTO-MCNC: NEGATIVE MG/DL
HYALINE CASTS #/AREA URNS LPF: ABNORMAL /LPF
KETONES UR STRIP.AUTO-MCNC: NEGATIVE MG/DL
LEUKOCYTE ESTERASE UR QL STRIP.AUTO: ABNORMAL
MICRO URNS: ABNORMAL
NITRITE UR QL STRIP.AUTO: NEGATIVE
PH UR STRIP.AUTO: 6.5 [PH]
PROT UR QL STRIP: NEGATIVE MG/DL
RBC # URNS HPF: ABNORMAL /HPF
RBC UR QL AUTO: NEGATIVE
SP GR UR STRIP.AUTO: 1.01
UROBILINOGEN UR STRIP.AUTO-MCNC: 0.2 MG/DL
WBC #/AREA URNS HPF: ABNORMAL /HPF

## 2018-04-30 PROCEDURE — A9270 NON-COVERED ITEM OR SERVICE: HCPCS | Performed by: EMERGENCY MEDICINE

## 2018-04-30 PROCEDURE — 87086 URINE CULTURE/COLONY COUNT: CPT

## 2018-04-30 PROCEDURE — 99284 EMERGENCY DEPT VISIT MOD MDM: CPT

## 2018-04-30 PROCEDURE — 81001 URINALYSIS AUTO W/SCOPE: CPT

## 2018-04-30 PROCEDURE — 700102 HCHG RX REV CODE 250 W/ 637 OVERRIDE(OP): Performed by: EMERGENCY MEDICINE

## 2018-04-30 RX ORDER — NITROFURANTOIN 25; 75 MG/1; MG/1
100 CAPSULE ORAL 2 TIMES DAILY
Qty: 14 CAP | Refills: 0 | Status: SHIPPED | OUTPATIENT
Start: 2018-04-30 | End: 2018-05-07

## 2018-04-30 RX ORDER — DIPHENHYDRAMINE HCL 25 MG
25 TABLET ORAL ONCE
Status: COMPLETED | OUTPATIENT
Start: 2018-04-30 | End: 2018-04-30

## 2018-04-30 RX ORDER — NITROFURANTOIN 25; 75 MG/1; MG/1
100 CAPSULE ORAL ONCE
Status: COMPLETED | OUTPATIENT
Start: 2018-04-30 | End: 2018-04-30

## 2018-04-30 RX ORDER — METOCLOPRAMIDE 10 MG/1
10 TABLET ORAL ONCE
Status: COMPLETED | OUTPATIENT
Start: 2018-04-30 | End: 2018-04-30

## 2018-04-30 RX ORDER — METOCLOPRAMIDE 10 MG/1
10 TABLET ORAL 4 TIMES DAILY PRN
Qty: 20 TAB | Refills: 0 | Status: ON HOLD | OUTPATIENT
Start: 2018-04-30 | End: 2018-10-19

## 2018-04-30 RX ORDER — DIPHENHYDRAMINE HCL 25 MG
25 CAPSULE ORAL EVERY 6 HOURS PRN
Qty: 20 CAP | Refills: 0 | Status: ON HOLD | OUTPATIENT
Start: 2018-04-30 | End: 2018-10-19

## 2018-04-30 RX ADMIN — METOCLOPRAMIDE HYDROCHLORIDE 10 MG: 10 TABLET ORAL at 21:28

## 2018-04-30 RX ADMIN — DIPHENHYDRAMINE HCL 25 MG: 25 TABLET ORAL at 21:28

## 2018-04-30 RX ADMIN — NITROFURANTOIN (MONOHYDRATE/MACROCRYSTALS) 100 MG: 75; 25 CAPSULE ORAL at 21:28

## 2018-04-30 ASSESSMENT — PAIN SCALES - GENERAL
PAINLEVEL_OUTOF10: 8
PAINLEVEL_OUTOF10: 4
PAINLEVEL_OUTOF10: 10

## 2018-05-01 NOTE — ED NOTES
D/C instructions reviewed in detail with pt. Pt states understanding. Scripts reviewed in detail and given x3. Pt left ambulatory with a steady gait. Family at side and will drive home.

## 2018-05-01 NOTE — ED PROVIDER NOTES
"CHIEF COMPLAINT  Chief Complaint   Patient presents with   • Headache     Began yesterday, has taken tylenol at home without relief. Pt reports headache pressure now behind L eye.    • Painful Urination     x2 days, \"my kidney hurts and it hurts to pee.\"        HPI  Sharyn Mejias is a  22 y.o. female who presents for evaluation of headache behind her left eye and burning with urination. Patient notes she has had burning with urination and some mild right-sided flank pain for approximately 3 days. She does not have a history of urinary tract infection and has not noted any other symptoms such as fevers, chills, abdominal pain, or blood in the urine. She denies the possibility of an STD. She notes incidentally that she has had pain behind her left eye since yesterday. It started gradually and was not associated with trauma. She has had intermittent scotomas and light sensitivity. Headache has a throbbing character but is not associated with any neck pain or stiffness. She has no history of migraines. She notes she is currently pregnant at approximately 16 weeks but denies any vaginal discharge or bleeding. She has had no pelvic pain.    REVIEW OF SYSTEMS  Constitutional: No fevers or weakness   Skin: No rashes  HEENT: No diplopia or blurred vision, no eye pain, no discharge. No ear pain, ringing in ears, or decreased hearing. No sore throat, runny nose, sores, trouble swallowing, trouble speaking.  Neck: No neck pain, stiffness, or masses.  Chest: No pain or rashes  Pulm: No shortness of breath, cough, wheezing, stridor, or pain with inspiration/expiration  Gastrointestinal: No vomiting, diarrhea  Genitourinary: Dysuria, no hematuria  Musculoskeletal: No recent trauma, pain, swelling, weakness  Neurologic: No sensory or motor changes.   Heme: No bleeding or bruising problems.   Immuno: No hx of recurrent infections      PAST MEDICAL HISTORY   has a past medical history of Depression (2012); " "Migraine (never Dx'd); Migraine (2010); N&V (nausea and vomiting); Ovarian cyst (6/25/13); and Uterine size-date discrepancy (8/31/2011).    SOCIAL HISTORY  Social History     Social History Main Topics   • Smoking status: Never Smoker   • Smokeless tobacco: Never Used   • Alcohol use No   • Drug use: No   • Sexual activity: Not Currently     Partners: Male      Comment: nuva ring        SURGICAL HISTORY  patient denies any surgical history    CURRENT MEDICATIONS  Home Medications     Reviewed by Martita Mckeon R.N. (Registered Nurse) on 04/30/18 at 2034  Med List Status: Complete   Medication Last Dose Status   Prenatal MV-Min-Fe Fum-FA-DHA (PRENATAL 1 PO) 4/30/2018 Active                ALLERGIES  Allergies   Allergen Reactions   • Nkda [No Known Drug Allergy]        PHYSICAL EXAM  VITAL SIGNS: /66   Pulse 91   Temp 36.2 °C (97.2 °F)   Resp 18   Ht 1.651 m (5' 5\")   Wt 107.6 kg (237 lb 3.4 oz)   LMP 01/18/2017 (Exact Date)   SpO2 97%   BMI 39.47 kg/m²    Gen: Alert,, mild distress, shielding eyes from light  HEENT: No signs of trauma, Bilateral external ears normal, Nose normal. Conjunctiva normal, Non-icteric. PERRLA, EOMI  Neck:  No tenderness, Supple, No masses  Lymphatic: No cervical lymphadenopathy noted.   Cardiovascular: Regular rate and rhythm, no murmurs.   Thorax & Lungs: Normal breath sounds, No respiratory distress, No wheezing bilateral chest rise  Skin: Warm, Dry, No erythema, No rash.   Back: No bony tenderness, mild right CVA tenderness  Extremities: Intact distal pulses, No edema  Neurologic: Alert , no facial droop, grossly normal coordination and strength  Psychiatric: Affect normal, Judgment normal, Mood normal.         LABS  Results for orders placed or performed during the hospital encounter of 04/30/18   URINALYSIS CULTURE, IF INDICATED   Result Value Ref Range    Color Yellow     Character Clear     Specific Gravity 1.015 <1.035    Ph 6.5 5.0 - 8.0    Glucose Negative " Negative mg/dL    Ketones Negative Negative mg/dL    Protein Negative Negative mg/dL    Bilirubin Negative Negative    Urobilinogen, Urine 0.2 Negative    Nitrite Negative Negative    Leukocyte Esterase Large (A) Negative    Occult Blood Negative Negative    Micro Urine Req Microscopic    URINE CULTURE(NEW)   Result Value Ref Range    Significant Indicator NEG     Source UR     Site      Urine Culture No growth at 24 hours.    URINE MICROSCOPIC (W/UA)   Result Value Ref Range    WBC 10-20 (A) /hpf    RBC 0-2 /hpf    Bacteria Moderate (A) None /hpf    Epithelial Cells Few /hpf    Hyaline Cast 0-2 /lpf       RADIOLOGY  No orders to display       Reevaluation   Time: 2300  Vital signs: Vital signs appear stable per nursing note  Assessment: Patient resting quietly. States pain is much improved. Felt comfortable with plan for discharge and treatment of urinary tract infection.      COURSE & MEDICAL DECISION MAKING  Pertinent Labs & Imaging studies reviewed. (See chart for details)  Patient has a finding suggestive of cystitis in the setting of a headache. Her headache is left-sided and behind her left orbit. There are no signs to suggest temporal arteritis and she has no significant visual changes. She has no neurologic deficits but does describe photophobia. The headache has a throbbing character and is likely a migraine or possibly cluster headache. I do not feel further imaging of her head is in her best interest that she is pregnant and the pretest probability of finding any significant pathology is extremely low. I do not suspect meningitis or encephalitis at this time and I do not feel subarachnoid hemorrhage is very likely given the character and onset of symptoms. She stated clear understanding of this and got excellent resolution of her pain after standard nonnarcotic migraine relief medications including Reglan, and Benadryl. I did feel empiric treatment with Macrobid was reasonable given her urine results.  Patient stated clear understanding that if her symptoms worsen or change she should return immediately for reevaluation.    FINAL IMPRESSION  1. Headache  2. Cystitis  3.         Electronically signed by: Dewayne Wells, 4/30/2018 9:03 PM

## 2018-05-01 NOTE — ED TRIAGE NOTES
"Chief Complaint   Patient presents with   • Headache     Began yesterday, has taken tylenol at home without relief. Pt reports headache pressure now behind L eye.    • Painful Urination     x2 days, \"my kidney hurts and it hurts to pee.\"      /75   Pulse (!) 112   Temp 36.5 °C (97.7 °F) (Temporal)   Resp 18   Ht 1.651 m (5' 5\")   Wt 107.6 kg (237 lb 3.4 oz)   LMP 01/18/2017 (Exact Date)   SpO2 98%   BMI 39.47 kg/m²     Pt ambulatory to triage for above, steady on feet. Pt returned to [facility], educated on triage process, instructed to notify staff of worsening symptoms/concerns. Given urine specimen cup  "

## 2018-05-01 NOTE — DISCHARGE INSTRUCTIONS
General Headache Without Cause  A headache is pain or discomfort felt around the head or neck area. The specific cause of a headache may not be found. There are many causes and types of headaches. A few common ones are:  · Tension headaches.  · Migraine headaches.  · Cluster headaches.  · Chronic daily headaches.  Follow these instructions at home:  Watch your condition for any changes. Take these steps to help with your condition:  Managing pain  · Take over-the-counter and prescription medicines only as told by your health care provider.  · Lie down in a dark, quiet room when you have a headache.  · If directed, apply ice to the head and neck area:  ¨ Put ice in a plastic bag.  ¨ Place a towel between your skin and the bag.  ¨ Leave the ice on for 20 minutes, 2-3 times per day.  · Use a heating pad or hot shower to apply heat to the head and neck area as told by your health care provider.  · Keep lights dim if bright lights bother you or make your headaches worse.  Eating and drinking  · Eat meals on a regular schedule.  · Limit alcohol use.  · Decrease the amount of caffeine you drink, or stop drinking caffeine.  General instructions  · Keep all follow-up visits as told by your health care provider. This is important.  · Keep a headache journal to help find out what may trigger your headaches. For example, write down:  ¨ What you eat and drink.  ¨ How much sleep you get.  ¨ Any change to your diet or medicines.  · Try massage or other relaxation techniques.  · Limit stress.  · Sit up straight, and do not tense your muscles.  · Do not use tobacco products, including cigarettes, chewing tobacco, or e-cigarettes. If you need help quitting, ask your health care provider.  · Exercise regularly as told by your health care provider.  · Sleep on a regular schedule. Get 7-9 hours of sleep, or the amount recommended by your health care provider.  Contact a health care provider if:  · Your symptoms are not helped by  medicine.  · You have a headache that is different from the usual headache.  · You have nausea or you vomit.  · You have a fever.  Get help right away if:  · Your headache becomes severe.  · You have repeated vomiting.  · You have a stiff neck.  · You have a loss of vision.  · You have problems with speech.  · You have pain in the eye or ear.  · You have muscular weakness or loss of muscle control.  · You lose your balance or have trouble walking.  · You feel faint or pass out.  · You have confusion.  This information is not intended to replace advice given to you by your health care provider. Make sure you discuss any questions you have with your health care provider.  Document Released: 12/18/2006 Document Revised: 05/25/2017 Document Reviewed: 04/11/2016  [employer] Interactive Patient Education © 2017 [employer]    Urinary Tract Infection, Adult  A urinary tract infection (UTI) is an infection of any part of the urinary tract, which includes the kidneys, ureters, bladder, and urethra. These organs make, store, and get rid of urine in the body. UTI can be a bladder infection (cystitis) or kidney infection (pyelonephritis).  What are the causes?  This infection may be caused by fungi, viruses, or bacteria. Bacteria are the most common cause of UTIs. This condition can also be caused by repeated incomplete emptying of the bladder during urination.  What increases the risk?  This condition is more likely to develop if:  · You ignore your need to urinate or hold urine for long periods of time.  · You do not empty your bladder completely during urination.  · You wipe back to front after urinating or having a bowel movement, if you are female.  · You are uncircumcised, if you are male.  · You are constipated.  · You have a urinary catheter that stays in place (indwelling).  · You have a weak defense (immune) system.  · You have a medical condition that affects your bowels, kidneys, or bladder.  · You have  diabetes.  · You take antibiotic medicines frequently or for long periods of time, and the antibiotics no longer work well against certain types of infections (antibiotic resistance).  · You take medicines that irritate your urinary tract.  · You are exposed to chemicals that irritate your urinary tract.  · You are female.  What are the signs or symptoms?  Symptoms of this condition include:  · Fever.  · Frequent urination or passing small amounts of urine frequently.  · Needing to urinate urgently.  · Pain or burning with urination.  · Urine that smells bad or unusual.  · Cloudy urine.  · Pain in the lower abdomen or back.  · Trouble urinating.  · Blood in the urine.  · Vomiting or being less hungry than normal.  · Diarrhea or abdominal pain.  · Vaginal discharge, if you are female.  How is this diagnosed?  This condition is diagnosed with a medical history and physical exam. You will also need to provide a urine sample to test your urine. Other tests may be done, including:  · Blood tests.  · Sexually transmitted disease (STD) testing.  If you have had more than one UTI, a cystoscopy or imaging studies may be done to determine the cause of the infections.  How is this treated?  Treatment for this condition often includes a combination of two or more of the following:  · Antibiotic medicine.  · Other medicines to treat less common causes of UTI.  · Over-the-counter medicines to treat pain.  · Drinking enough water to stay hydrated.  Follow these instructions at home:  · Take over-the-counter and prescription medicines only as told by your health care provider.  · If you were prescribed an antibiotic, take it as told by your health care provider. Do not stop taking the antibiotic even if you start to feel better.  · Avoid alcohol, caffeine, tea, and carbonated beverages. They can irritate your bladder.  · Drink enough fluid to keep your urine clear or pale yellow.  · Keep all follow-up visits as told by your health  care provider. This is important.  · Make sure to:  ¨ Empty your bladder often and completely. Do not hold urine for long periods of time.  ¨ Empty your bladder before and after sex.  ¨ Wipe from front to back after a bowel movement if you are female. Use each tissue one time when you wipe.  Contact a health care provider if:  · You have back pain.  · You have a fever.  · You feel nauseous or vomit.  · Your symptoms do not get better after 3 days.  · Your symptoms go away and then return.  Get help right away if:  · You have severe back pain or lower abdominal pain.  · You are vomiting and cannot keep down any medicines or water.  This information is not intended to replace advice given to you by your health care provider. Make sure you discuss any questions you have with your health care provider.  Document Released: 09/27/2006 Document Revised: 05/31/2017 Document Reviewed: 11/07/2016  [employer] Interactive Patient Education © 2017 [employer]

## 2018-05-01 NOTE — ED NOTES
Pt states for pain to be doing better and is tolerable. ERP at bedside. Pt states she feels well enough for D/C.

## 2018-05-01 NOTE — ED NOTES
Pt roomed from [facility]. Gait steady. Pt is accompanied by family x2. Pt presents for symptoms as stated in the triage note. Pain is primarily in the R flank with multiple urinary complaints. Pt gave UA and sent per protocol. Pt reports HA is behind the L eye and around the L side of her head. Lights are dimmed for comfort.

## 2018-05-02 ENCOUNTER — HOSPITAL ENCOUNTER (OUTPATIENT)
Dept: LAB | Facility: MEDICAL CENTER | Age: 23
End: 2018-05-02
Attending: PHYSICIAN ASSISTANT
Payer: COMMERCIAL

## 2018-05-02 DIAGNOSIS — R73.09 ELEVATED GLUCOSE TOLERANCE TEST: ICD-10-CM

## 2018-05-02 LAB
BACTERIA UR CULT: NORMAL
GLUCOSE 1H P CHAL SERPL-MCNC: 128 MG/DL (ref 65–180)
GLUCOSE 2H P CHAL SERPL-MCNC: 89 MG/DL (ref 65–155)
GLUCOSE 3H P CHAL SERPL-MCNC: 55 MG/DL (ref 65–140)
GLUCOSE BS SERPL-MCNC: 87 MG/DL (ref 65–95)
SIGNIFICANT IND 70042: NORMAL
SITE SITE: NORMAL
SOURCE SOURCE: NORMAL

## 2018-05-16 ENCOUNTER — HOSPITAL ENCOUNTER (EMERGENCY)
Dept: HOSPITAL 8 - ED | Age: 23
Discharge: HOME | End: 2018-05-16
Payer: MEDICAID

## 2018-05-16 VITALS — SYSTOLIC BLOOD PRESSURE: 109 MMHG | DIASTOLIC BLOOD PRESSURE: 55 MMHG

## 2018-05-16 VITALS — HEIGHT: 66 IN | BODY MASS INDEX: 38.55 KG/M2 | WEIGHT: 239.86 LBS

## 2018-05-16 DIAGNOSIS — O46.92: Primary | ICD-10-CM

## 2018-05-16 DIAGNOSIS — Z3A.17: ICD-10-CM

## 2018-05-16 LAB
ALBUMIN SERPL-MCNC: 2.7 G/DL (ref 3.4–5)
ALP SERPL-CCNC: 61 U/L (ref 45–117)
ALT SERPL-CCNC: 25 U/L (ref 12–78)
ANION GAP SERPL CALC-SCNC: 9 MMOL/L (ref 5–15)
BASOPHILS # BLD AUTO: 0.03 X10^3/UL (ref 0–0.1)
BASOPHILS NFR BLD AUTO: 0 % (ref 0–1)
BILIRUB SERPL-MCNC: 0.1 MG/DL (ref 0.2–1)
CALCIUM SERPL-MCNC: 8.5 MG/DL (ref 8.5–10.1)
CHLORIDE SERPL-SCNC: 106 MMOL/L (ref 98–107)
CREAT SERPL-MCNC: 0.49 MG/DL (ref 0.55–1.02)
EOSINOPHIL # BLD AUTO: 0.24 X10^3/UL (ref 0–0.4)
EOSINOPHIL NFR BLD AUTO: 2 % (ref 1–7)
ERYTHROCYTE [DISTWIDTH] IN BLOOD BY AUTOMATED COUNT: 13.7 % (ref 9.6–15.2)
LYMPHOCYTES # BLD AUTO: 2.6 X10^3/UL (ref 1–3.4)
LYMPHOCYTES NFR BLD AUTO: 22 % (ref 22–44)
MCH RBC QN AUTO: 30.1 PG (ref 27–34.8)
MCHC RBC AUTO-ENTMCNC: 34.2 G/DL (ref 32.4–35.8)
MCV RBC AUTO: 88.1 FL (ref 80–100)
MD: NO
MONOCYTES # BLD AUTO: 0.62 X10^3/UL (ref 0.2–0.8)
MONOCYTES NFR BLD AUTO: 5 % (ref 2–9)
NEUTROPHILS # BLD AUTO: 8.31 X10^3/UL (ref 1.8–6.8)
NEUTROPHILS NFR BLD AUTO: 70 % (ref 42–75)
PLATELET # BLD AUTO: 279 X10^3/UL (ref 130–400)
PMV BLD AUTO: 9.3 FL (ref 7.4–10.4)
PROT SERPL-MCNC: 7 G/DL (ref 6.4–8.2)
RBC # BLD AUTO: 4.68 X10^6/UL (ref 3.82–5.3)

## 2018-05-16 PROCEDURE — 76815 OB US LIMITED FETUS(S): CPT

## 2018-05-16 PROCEDURE — 99285 EMERGENCY DEPT VISIT HI MDM: CPT

## 2018-05-16 PROCEDURE — 36415 COLL VENOUS BLD VENIPUNCTURE: CPT

## 2018-05-16 PROCEDURE — 86901 BLOOD TYPING SEROLOGIC RH(D): CPT

## 2018-05-16 PROCEDURE — 84702 CHORIONIC GONADOTROPIN TEST: CPT

## 2018-05-16 PROCEDURE — 85025 COMPLETE CBC W/AUTO DIFF WBC: CPT

## 2018-05-16 PROCEDURE — 80053 COMPREHEN METABOLIC PANEL: CPT

## 2018-05-24 ENCOUNTER — HOSPITAL ENCOUNTER (OUTPATIENT)
Facility: MEDICAL CENTER | Age: 23
End: 2018-05-24
Attending: PHYSICIAN ASSISTANT

## 2018-05-24 ENCOUNTER — ROUTINE PRENATAL (OUTPATIENT)
Dept: OBGYN | Facility: CLINIC | Age: 23
End: 2018-05-24
Payer: MEDICAID

## 2018-05-24 VITALS — DIASTOLIC BLOOD PRESSURE: 70 MMHG | SYSTOLIC BLOOD PRESSURE: 118 MMHG | WEIGHT: 240 LBS | BODY MASS INDEX: 39.94 KG/M2

## 2018-05-24 DIAGNOSIS — O09.291 HISTORY OF MACROSOMIA IN INFANT IN PRIOR PREGNANCY, CURRENTLY PREGNANT IN FIRST TRIMESTER: ICD-10-CM

## 2018-05-24 DIAGNOSIS — R73.09 ELEVATED GLUCOSE TOLERANCE TEST: ICD-10-CM

## 2018-05-24 DIAGNOSIS — Z34.81 ENCOUNTER FOR SUPERVISION OF OTHER NORMAL PREGNANCY, FIRST TRIMESTER: Primary | ICD-10-CM

## 2018-05-24 DIAGNOSIS — A74.9 CHLAMYDIA: ICD-10-CM

## 2018-05-24 PROCEDURE — 88175 CYTOPATH C/V AUTO FLUID REDO: CPT

## 2018-05-24 PROCEDURE — 90040 PR PRENATAL FOLLOW UP: CPT | Performed by: PHYSICIAN ASSISTANT

## 2018-05-24 NOTE — PROGRESS NOTES
Pt has no complaints with cramping, bleeding or pain, though pt has had a lot of difficulty sleeping and staying asleep. +FM but little. US to be done in 2 wks. 3hr GTT results d/w pt - all wnl, and pt informed she will need another 3hr GTT at 24-28wk. Also, GC/CT wnl - pt notified. No PAP results received from [facility], so PAP repeated today. Lab did not draw AFP with 3hr GTT, though pt asked for it. Unfortunately, pt now needs to do AFP, which she will do today after appt. Proper sleep hygiene techniques d/w pt, pt to call if not improving. RTC 4 wk or sooner prn.

## 2018-05-24 NOTE — PROGRESS NOTES
Pt here today for OB follow up  Pt states no complaints   Reports -  Good # [phone]  Pharmacy Confirmed.  PAP today.  U/S on 6/8/18.

## 2018-05-25 DIAGNOSIS — Z34.81 ENCOUNTER FOR SUPERVISION OF OTHER NORMAL PREGNANCY, FIRST TRIMESTER: ICD-10-CM

## 2018-05-25 LAB — CYTOLOGY REG CYTOL: NORMAL

## 2018-05-27 ENCOUNTER — APPOINTMENT (OUTPATIENT)
Dept: RADIOLOGY | Facility: MEDICAL CENTER | Age: 23
End: 2018-05-27
Attending: EMERGENCY MEDICINE

## 2018-05-27 ENCOUNTER — HOSPITAL ENCOUNTER (EMERGENCY)
Facility: MEDICAL CENTER | Age: 23
End: 2018-05-27
Attending: EMERGENCY MEDICINE

## 2018-05-27 VITALS
DIASTOLIC BLOOD PRESSURE: 75 MMHG | OXYGEN SATURATION: 98 % | WEIGHT: 250 LBS | SYSTOLIC BLOOD PRESSURE: 110 MMHG | HEART RATE: 98 BPM | RESPIRATION RATE: 15 BRPM | BODY MASS INDEX: 41.65 KG/M2 | HEIGHT: 65 IN | TEMPERATURE: 98.7 F

## 2018-05-27 DIAGNOSIS — S93.601A FOOT SPRAIN, RIGHT, INITIAL ENCOUNTER: ICD-10-CM

## 2018-05-27 PROCEDURE — 73630 X-RAY EXAM OF FOOT: CPT | Mod: RT

## 2018-05-27 PROCEDURE — 99284 EMERGENCY DEPT VISIT MOD MDM: CPT

## 2018-05-27 RX ORDER — MELOXICAM 7.5 MG/1
7.5 TABLET ORAL DAILY
Qty: 30 TAB | Refills: 0 | Status: ON HOLD | OUTPATIENT
Start: 2018-05-27 | End: 2018-10-19

## 2018-05-27 NOTE — ED NOTES
Pt verbalizes understanding of discharge instructions. Patient to follow up with PCP if needed. Patient to discharge via wheelchair and daughter in tow

## 2018-05-27 NOTE — ED PROVIDER NOTES
"ED Provider Note    CHIEF COMPLAINT  Chief Complaint   Patient presents with   • T-5000 GLF     Pt states,  \"I tripped landed on my butt and somehow hurt my right foot.\"       HPI  Sharyn Mejias is a 22 y.o. female who presents for evaluation of foot pain. The patient states that she tripped and injured her right foot. She points to the lateral aspect of the foot as her site of discomfort. She denies any ankle or knee discomfort.    REVIEW OF SYSTEMS  See HPI for further details. All other systems are negative.     PAST MEDICAL HISTORY  Past Medical History:   Diagnosis Date   • Depression 2012    postpartum depression   • Migraine never Dx'd    Tylenol - helps sometimes butnot always.   • Migraine 2010    during pregnancy    • N&V (nausea and vomiting)    • Ovarian cyst 6/25/13   • Uterine size-date discrepancy 8/31/2011       FAMILY HISTORY  Family History   Problem Relation Age of Onset   • Diabetes Mother      insulin   • Hyperlipidemia Father    • Hypertension Father    • Hypertension Maternal Aunt    • Diabetes Maternal Aunt      pills   • Diabetes Maternal Grandmother      pills   • Hypertension Paternal Grandmother    • Hyperlipidemia Paternal Grandmother        SOCIAL HISTORY  Social History     Social History   • Marital status:      Spouse name: N/A   • Number of children: N/A   • Years of education: N/A     Social History Main Topics   • Smoking status: Never Smoker   • Smokeless tobacco: Never Used   • Alcohol use No   • Drug use: No   • Sexual activity: Not Currently     Partners: Male      Comment: nuva ring      Other Topics Concern   • Not on file     Social History Narrative    ** Merged History Encounter **            SURGICAL HISTORY  No past surgical history on file.    CURRENT MEDICATIONS  Home Medications    **Home medications have not yet been reviewed for this encounter**         ALLERGIES  Allergies   Allergen Reactions   • Nkda [No Known Drug Allergy]  " "      PHYSICAL EXAM  VITAL SIGNS: /79   Pulse 99   Temp 36.9 °C (98.4 °F)   Resp 15   Ht 1.651 m (5' 5\")   Wt 113.4 kg (250 lb)   LMP 01/18/2017 (Exact Date)   SpO2 99%   BMI 41.60 kg/m²     Constitutional: Well developed, Well nourished, No acute distress, Non-toxic appearance.   HENT: Normocephalic, Atraumatic.   Cardiovascular: Normal heart rate.   Thorax & Lungs: No respiratory distress.  Skin: Warm, Dry.   Musculoskeletal: Right lower extremity shows no obvious deformity. Full range of motion of the hip and the knee. There is no proximal fibular tenderness. There is no ankle tenderness or swelling. She does have tenderness the base of the 5th metatarsal. The foot appears neurovascularly intact.  Neurologic: Awake alert.    RADIOLOGY/PROCEDURES  DX-FOOT-COMPLETE 3+ RIGHT   Final Result      1.  No fracture or dislocation of RIGHT foot.   2.  Hindfoot soft tissue swelling.            COURSE & MEDICAL DECISION MAKING  Pertinent Labs & Imaging studies reviewed. (See chart for details)  This is a 22-year-old here for evaluation of right foot pain. She does have tenderness over the base of the 5th metatarsal. X-rays are obtained and show no fracture or dislocation. She has some slight soft tissue swelling. Discussed results of the study with the patient. At this point I believe this represents a sprain. She will be provided discharge instruction sheet on sprains. Initially I had prescribed meloxicam but the patient is pregnant so she will not be given this prescription and she should take Tylenol for discomfort. I referred her to Dr. Romero orthopedist for follow-up. She is given a discharge instruction sheet on foot sprains.    FINAL IMPRESSION  1. Right foot sprain  2.   3.         Electronically signed by: Vasu Varela, 5/27/2018 9:58 AM    "

## 2018-05-27 NOTE — ED TRIAGE NOTES
"Sharyn Mejias  22 y.o.  Chief Complaint   Patient presents with   • T-5000 GLF     Pt states,  \"I tripped landed on my butt and somehow hurt my foot.\"       Pt in WC to triage room. Pt states she can't put weight on her right foot. CMS intact. Swelling of ankle noted. NAD.    Triage process explained to patient, educated pt to notify staff at  if s/s worsened, apologized for wait time, and returned pt to lobby.    "

## 2018-05-27 NOTE — DISCHARGE INSTRUCTIONS
Foot Sprain  You have a sprained foot. When you twist your foot, the ligaments that hold the joints together are injured. This may cause pain, swelling, bruising, and difficulty walking. Proper treatment will shorten your disability and help you prevent re-injury. To treat a sprained foot you should:  · Elevate your foot for the next 2-3 days to reduce swelling.   · Apply ice packs to the foot for 20-30 minutes every 2-3 hours.   · Wrap your foot with a compression bandage as long as it is swollen or tender.   · Do not walk on your foot if it still hurts a lot.  Use crutches or a cane until weight bearing becomes painless.   · Special podiatric shoes or shoes with rigid soles may be useful in allowing earlier walking.   Only take over-the-counter or prescription medicines for pain, discomfort, or fever as directed by your caregiver. Most foot sprains will heal completely in 3-6 weeks with proper rest.  If you still have pain or swelling after 2-3 weeks, or if your pain worsens, you should see your doctor for further evaluation.  Document Released: 01/25/2006 Document Revised: 03/11/2013 Document Reviewed: 12/19/2009  [employer]® Patient Information ©2013 [employer].

## 2018-06-08 ENCOUNTER — APPOINTMENT (OUTPATIENT)
Dept: RADIOLOGY | Facility: IMAGING CENTER | Age: 23
End: 2018-06-08
Attending: NURSE PRACTITIONER
Payer: MEDICAID

## 2018-06-08 DIAGNOSIS — Z34.81 ENCOUNTER FOR SUPERVISION OF OTHER NORMAL PREGNANCY, FIRST TRIMESTER: ICD-10-CM

## 2018-06-08 PROCEDURE — 76805 OB US >/= 14 WKS SNGL FETUS: CPT | Performed by: OBSTETRICS & GYNECOLOGY

## 2018-06-11 ENCOUNTER — DATING (OUTPATIENT)
Dept: OBGYN | Facility: CLINIC | Age: 23
End: 2018-06-11

## 2018-06-20 ENCOUNTER — ROUTINE PRENATAL (OUTPATIENT)
Dept: OBGYN | Facility: CLINIC | Age: 23
End: 2018-06-20
Payer: MEDICAID

## 2018-06-20 VITALS — SYSTOLIC BLOOD PRESSURE: 112 MMHG | BODY MASS INDEX: 40.44 KG/M2 | DIASTOLIC BLOOD PRESSURE: 58 MMHG | WEIGHT: 243 LBS

## 2018-06-20 DIAGNOSIS — L29.9 ITCHING: ICD-10-CM

## 2018-06-20 DIAGNOSIS — Z34.81 ENCOUNTER FOR SUPERVISION OF OTHER NORMAL PREGNANCY, FIRST TRIMESTER: Primary | ICD-10-CM

## 2018-06-20 DIAGNOSIS — O09.291 HISTORY OF MACROSOMIA IN INFANT IN PRIOR PREGNANCY, CURRENTLY PREGNANT IN FIRST TRIMESTER: ICD-10-CM

## 2018-06-20 PROCEDURE — 90040 PR PRENATAL FOLLOW UP: CPT | Performed by: PHYSICIAN ASSISTANT

## 2018-06-20 NOTE — PROGRESS NOTES
Pt has no complaints with bleeding or pain, though pt has had cramping, usually after work. +FM. Pt also has had full body itching, though no rash, no new meds, lotions, soaps or detergents. Will do bile acids now, pt to try Aveeno baths, Benadryl prn. PAP, GC/CT wnl but AFP not done. US wnl - pt notified of results. Pt will need 3hr GTT next visit. RTC 4 wk or sooner prn.

## 2018-06-20 NOTE — PROGRESS NOTES
Pt here today for OB follow up  Pt states seen in ER 5/27/18 from a fall  Reports +FM   Good # [phone]  Pharmacy Confirmed.  U/S 6/8/18     Parent

## 2018-06-24 ENCOUNTER — APPOINTMENT (OUTPATIENT)
Dept: RADIOLOGY | Facility: MEDICAL CENTER | Age: 23
End: 2018-06-24
Attending: STUDENT IN AN ORGANIZED HEALTH CARE EDUCATION/TRAINING PROGRAM

## 2018-06-24 ENCOUNTER — HOSPITAL ENCOUNTER (OUTPATIENT)
Facility: MEDICAL CENTER | Age: 23
End: 2018-06-24
Attending: OBSTETRICS & GYNECOLOGY | Admitting: OBSTETRICS & GYNECOLOGY

## 2018-06-24 VITALS — SYSTOLIC BLOOD PRESSURE: 120 MMHG | TEMPERATURE: 98.5 F | HEART RATE: 96 BPM | DIASTOLIC BLOOD PRESSURE: 68 MMHG

## 2018-06-24 LAB
APPEARANCE UR: ABNORMAL
COLOR UR AUTO: ABNORMAL
GLUCOSE UR QL STRIP.AUTO: NEGATIVE MG/DL
KETONES UR QL STRIP.AUTO: NEGATIVE MG/DL
LEUKOCYTE ESTERASE UR QL STRIP.AUTO: ABNORMAL
NITRITE UR QL STRIP.AUTO: NEGATIVE
PH UR STRIP.AUTO: 7 [PH]
PROT UR QL STRIP: ABNORMAL MG/DL
RBC UR QL AUTO: NEGATIVE
SP GR UR: 1.02

## 2018-06-24 PROCEDURE — 76817 TRANSVAGINAL US OBSTETRIC: CPT

## 2018-06-24 PROCEDURE — 81002 URINALYSIS NONAUTO W/O SCOPE: CPT

## 2018-06-25 NOTE — PROGRESS NOTES
[facility] DELIV[facility] TRIAGE PROGRESS NOTE    PATIENT ID:  NAME:  Sharyn Mejias  MRN:               [id number]  YOB: 1995     22 y.o. female  at 22w3d.    Subjective: [place] the [place] with her kids and had some strong cramps/cxn's.     Pregnancy complicated by chlamydia w/ JUANITA.     positive  For CTXS.   negative Feels pain   negative for LOF  negative for vaginal bleeding.   positive for fetal movement    ROS: Patient denies any fever chills, nausea, vomiting, headache, chest pain, shortness of breath, or dysuria or unusual swelling of hands or feet.     Objective:    Vitals:    18 1754   BP: 120/68   Pulse: 96   Temp: 36.9 °C (98.5 °F)   TempSrc: Temporal     Temp (24hrs), Av.9 °C (98.5 °F), Min:36.9 °C (98.5 °F), Max:36.9 °C (98.5 °F)    General: No acute distress, resting comfortably in bed.  HEENT: normocephalic, nontraumatic, PERRLA, EOMI  Cardiovascular: Heart RRR with no murmurs, rubs or gallops. Distal Pulses 2+  Respiratory: symmetric chest expansion, lungs CTAB, with no wheezes, rales, rhonci  Abdomen: gravid, nontender  Musculoskeletal: strength 5/5 in four extremities  Neuro: non focal with no numbness, tingling or changes in sensation    Cervix:  1/thick/high  [place]: No uterine cxn's  FHRM: Baseline 150 w/ variability appropriate for gestational age    Assessment: 22 y.o. female  at 22w3d.    Plan:   1. Check cervical length w/ transvaginal US - if wnl, then d/c home  2. Return precautions and instructions to increase PO hydration   3. Avoid NSAIDS, tylenol or benadryl as needed for discomfort and sleep aid  4.  Recommend f/u in clinic in the next 1-2 weeks      Discussed case with Dr. Sam TPN Attending. Case was discussed and attending agreed with plan prior to discharge of patient.

## 2018-06-25 NOTE — PROGRESS NOTES
1900 report from FABIAN Gore RN  1930 MARVIN Marshall CNM updated and orders to discharge home received  1932 discharge instruction provided and educated about maternity support belt for comfort.   1936 patient off floor stable on feet.

## 2018-06-25 NOTE — PROGRESS NOTES
with EDC of 10/25 making her 22.3 presents for menstrual cramping that started at 1500. Denies bleeding or LOF; reports FM. VSS. Urine dipped; see results. Report given to Dr. Malone; at bedside to evaluate. Orders received for SVE; /high; orders received for u/s for cervical length   1900: Report given to Alex ANDREWS

## 2018-07-17 ENCOUNTER — ROUTINE PRENATAL (OUTPATIENT)
Dept: OBGYN | Facility: CLINIC | Age: 23
End: 2018-07-17

## 2018-07-17 VITALS — DIASTOLIC BLOOD PRESSURE: 70 MMHG | SYSTOLIC BLOOD PRESSURE: 112 MMHG | WEIGHT: 247 LBS | BODY MASS INDEX: 41.1 KG/M2

## 2018-07-17 DIAGNOSIS — O09.291 HISTORY OF MACROSOMIA IN INFANT IN PRIOR PREGNANCY, CURRENTLY PREGNANT IN FIRST TRIMESTER: ICD-10-CM

## 2018-07-17 DIAGNOSIS — Z34.81 ENCOUNTER FOR SUPERVISION OF OTHER NORMAL PREGNANCY, FIRST TRIMESTER: Primary | ICD-10-CM

## 2018-07-17 PROCEDURE — 90040 PR PRENATAL FOLLOW UP: CPT | Performed by: NURSE PRACTITIONER

## 2018-07-17 NOTE — PROGRESS NOTES
Pt here today for OB follow up  Pt states was having cx's and 1 cm dilated.   Reports +  Good # [phone]  Pharmacy Confirmed.  Pt given 3 hr gtt and instructions.

## 2018-07-17 NOTE — PROGRESS NOTES
S:  Pt is  at 25w5d here for routine OB follow up.  No c/o.  Reports good FM.  Denies VB, RUCs, LOF or vaginal DC. Still reports some itching, worse at night - but hasn't done her bile acid labs.     O:  Please see above vitals.        FHTs: 144        Fundal ht: 25 cm.        Complete OB US: wnl    A:  IUP at 25w5d  Patient Active Problem List    Diagnosis Date Noted   • Encounter for supervision of other normal pregnancy, first trimester 2018   • History of macrosomia in infant - 8lb 14oz, early 1hr GTT elevated, 3hr GTT wnl 2018   • Depression 2012   • Migraine        P:  1. Questions answered.           2. Encouraged adequate water intake        3.   labor precautions reviewed.        4.  F/u 4 wks.        5.  28wk labs, including 3hr GTT ordered. Encouraged pt to complete all labs at same time.

## 2018-07-17 NOTE — PATIENT INSTRUCTIONS
P:  1. Questions answered.           2. Encouraged adequate water intake        3.   labor precautions reviewed.        4.  F/u 4 wks.        5.  28wk labs, including 3hr GTT ordered. Encouraged pt to complete all labs at same time.

## 2018-07-30 ENCOUNTER — HOSPITAL ENCOUNTER (OUTPATIENT)
Dept: LAB | Facility: MEDICAL CENTER | Age: 23
End: 2018-07-30
Attending: NURSE PRACTITIONER
Payer: COMMERCIAL

## 2018-07-30 ENCOUNTER — HOSPITAL ENCOUNTER (OUTPATIENT)
Dept: LAB | Facility: MEDICAL CENTER | Age: 23
End: 2018-07-30
Attending: PHYSICIAN ASSISTANT
Payer: COMMERCIAL

## 2018-07-30 DIAGNOSIS — Z34.81 ENCOUNTER FOR SUPERVISION OF OTHER NORMAL PREGNANCY, FIRST TRIMESTER: ICD-10-CM

## 2018-07-30 DIAGNOSIS — L29.9 ITCHING: ICD-10-CM

## 2018-07-30 LAB
GLUCOSE 1H P CHAL SERPL-MCNC: 158 MG/DL (ref 65–180)
GLUCOSE 2H P CHAL SERPL-MCNC: 134 MG/DL (ref 65–155)
GLUCOSE 3H P CHAL SERPL-MCNC: 82 MG/DL (ref 65–140)
GLUCOSE BS SERPL-MCNC: 77 MG/DL (ref 65–95)
HCT VFR BLD AUTO: 38.9 % (ref 37–47)
HGB BLD-MCNC: 13 G/DL (ref 12–16)
RPR SER QL: NON REACTIVE
TREPONEMA PALLIDUM IGG+IGM AB [PRESENCE] IN SERUM OR PLASMA BY IMMUNOASSAY: REACTIVE

## 2018-07-31 LAB — BILE AC SERPL-SCNC: 5 UMOL/L (ref 0–10)

## 2018-08-08 ENCOUNTER — HOSPITAL ENCOUNTER (OUTPATIENT)
Facility: MEDICAL CENTER | Age: 23
End: 2018-08-08
Attending: OBSTETRICS & GYNECOLOGY | Admitting: OBSTETRICS & GYNECOLOGY

## 2018-08-08 VITALS — SYSTOLIC BLOOD PRESSURE: 123 MMHG | DIASTOLIC BLOOD PRESSURE: 72 MMHG | TEMPERATURE: 98 F | HEART RATE: 83 BPM

## 2018-08-08 LAB
ALBUMIN SERPL BCP-MCNC: 3.2 G/DL (ref 3.2–4.9)
ALBUMIN/GLOB SERPL: 1.1 G/DL
ALP SERPL-CCNC: 72 U/L (ref 30–99)
ALT SERPL-CCNC: 13 U/L (ref 2–50)
ANION GAP SERPL CALC-SCNC: 4 MMOL/L (ref 0–11.9)
APPEARANCE UR: CLEAR
AST SERPL-CCNC: 13 U/L (ref 12–45)
BASOPHILS # BLD AUTO: 0.2 % (ref 0–1.8)
BASOPHILS # BLD: 0.02 K/UL (ref 0–0.12)
BILIRUB SERPL-MCNC: 0.3 MG/DL (ref 0.1–1.5)
BUN SERPL-MCNC: 9 MG/DL (ref 8–22)
CALCIUM SERPL-MCNC: 8.6 MG/DL (ref 8.5–10.5)
CHLORIDE SERPL-SCNC: 103 MMOL/L (ref 96–112)
CO2 SERPL-SCNC: 24 MMOL/L (ref 20–33)
COLOR UR AUTO: YELLOW
CREAT SERPL-MCNC: 0.58 MG/DL (ref 0.5–1.4)
EOSINOPHIL # BLD AUTO: 0.19 K/UL (ref 0–0.51)
EOSINOPHIL NFR BLD: 1.5 % (ref 0–6.9)
ERYTHROCYTE [DISTWIDTH] IN BLOOD BY AUTOMATED COUNT: 37.6 FL (ref 35.9–50)
GLOBULIN SER CALC-MCNC: 2.9 G/DL (ref 1.9–3.5)
GLUCOSE SERPL-MCNC: 98 MG/DL (ref 65–99)
GLUCOSE UR QL STRIP.AUTO: NEGATIVE MG/DL
HCT VFR BLD AUTO: 34.6 % (ref 37–47)
HGB BLD-MCNC: 12.3 G/DL (ref 12–16)
IMM GRANULOCYTES # BLD AUTO: 0.05 K/UL (ref 0–0.11)
IMM GRANULOCYTES NFR BLD AUTO: 0.4 % (ref 0–0.9)
KETONES UR QL STRIP.AUTO: NEGATIVE MG/DL
LEUKOCYTE ESTERASE UR QL STRIP.AUTO: ABNORMAL
LYMPHOCYTES # BLD AUTO: 2.27 K/UL (ref 1–4.8)
LYMPHOCYTES NFR BLD: 18.3 % (ref 22–41)
MCH RBC QN AUTO: 29.3 PG (ref 27–33)
MCHC RBC AUTO-ENTMCNC: 35.5 G/DL (ref 33.6–35)
MCV RBC AUTO: 82.4 FL (ref 81.4–97.8)
MONOCYTES # BLD AUTO: 0.62 K/UL (ref 0–0.85)
MONOCYTES NFR BLD AUTO: 5 % (ref 0–13.4)
NEUTROPHILS # BLD AUTO: 9.27 K/UL (ref 2–7.15)
NEUTROPHILS NFR BLD: 74.6 % (ref 44–72)
NITRITE UR QL STRIP.AUTO: NEGATIVE
NRBC # BLD AUTO: 0 K/UL
NRBC BLD-RTO: 0 /100 WBC
PH UR STRIP.AUTO: 7 [PH]
PLATELET # BLD AUTO: 258 K/UL (ref 164–446)
PMV BLD AUTO: 10.3 FL (ref 9–12.9)
POTASSIUM SERPL-SCNC: 4 MMOL/L (ref 3.6–5.5)
PROT SERPL-MCNC: 6.1 G/DL (ref 6–8.2)
PROT UR QL STRIP: NEGATIVE MG/DL
RBC # BLD AUTO: 4.2 M/UL (ref 4.2–5.4)
RBC UR QL AUTO: NEGATIVE
SODIUM SERPL-SCNC: 131 MMOL/L (ref 135–145)
SP GR UR: 1.02
URATE SERPL-MCNC: 5.1 MG/DL (ref 1.9–8.2)
WBC # BLD AUTO: 12.4 K/UL (ref 4.8–10.8)

## 2018-08-08 PROCEDURE — 36415 COLL VENOUS BLD VENIPUNCTURE: CPT

## 2018-08-08 PROCEDURE — 84550 ASSAY OF BLOOD/URIC ACID: CPT

## 2018-08-08 PROCEDURE — 80053 COMPREHEN METABOLIC PANEL: CPT

## 2018-08-08 PROCEDURE — 81002 URINALYSIS NONAUTO W/O SCOPE: CPT

## 2018-08-08 PROCEDURE — 59025 FETAL NON-STRESS TEST: CPT | Performed by: OBSTETRICS & GYNECOLOGY

## 2018-08-08 PROCEDURE — 85025 COMPLETE CBC W/AUTO DIFF WBC: CPT

## 2018-08-08 NOTE — PROGRESS NOTES
"  EDC 10/25-      Pt presents to L&D with c/o headache for the past two days. Pt also reports \"seeing sparkles in front of her eyes sometimes.\" Pt denies epigastric pain. Pt reports good fetal movement, denies vaginal bleeding, contractions, and leaking of fluid. EFM/TOCO applied. Blood pressure- 123/72, POC UA WNL, no protein. Report to Dr [place], order received for labs.     1245- labs WNL, report to Dr Hernandez, order received for discharge. Discharge teaching done, pt verbalized understanding. Pt to home in stable condition.   "

## 2018-08-14 ENCOUNTER — HOSPITAL ENCOUNTER (OUTPATIENT)
Facility: MEDICAL CENTER | Age: 23
End: 2018-08-14
Attending: NURSE PRACTITIONER
Payer: COMMERCIAL

## 2018-08-14 ENCOUNTER — ROUTINE PRENATAL (OUTPATIENT)
Dept: OBGYN | Facility: CLINIC | Age: 23
End: 2018-08-14
Payer: MEDICAID

## 2018-08-14 VITALS — WEIGHT: 253 LBS | BODY MASS INDEX: 42.1 KG/M2 | DIASTOLIC BLOOD PRESSURE: 70 MMHG | SYSTOLIC BLOOD PRESSURE: 120 MMHG

## 2018-08-14 DIAGNOSIS — O26.849 UTERINE SIZE-DATE DISCREPANCY, ANTEPARTUM: Primary | ICD-10-CM

## 2018-08-14 DIAGNOSIS — Z34.81 ENCOUNTER FOR SUPERVISION OF OTHER NORMAL PREGNANCY, FIRST TRIMESTER: ICD-10-CM

## 2018-08-14 DIAGNOSIS — O09.291 HISTORY OF MACROSOMIA IN INFANT IN PRIOR PREGNANCY, CURRENTLY PREGNANT IN FIRST TRIMESTER: ICD-10-CM

## 2018-08-14 PROCEDURE — 90471 IMMUNIZATION ADMIN: CPT | Performed by: NURSE PRACTITIONER

## 2018-08-14 PROCEDURE — 90040 PR PRENATAL FOLLOW UP: CPT | Performed by: NURSE PRACTITIONER

## 2018-08-14 PROCEDURE — 90715 TDAP VACCINE 7 YRS/> IM: CPT | Performed by: NURSE PRACTITIONER

## 2018-08-14 NOTE — PROGRESS NOTES
SUBJECTIVE:  Pt is a 22 y.o.   at 29w5d  gestation. Presents today for follow-up prenatal care. Reports no issues at this time.  Reports gppd  fetal movement. Desires to be re-screened for GC/chlam. Also wants TDaP today.  Denies cramping/contractions, bleeding or leaking of fluid. Denies dysuria, headaches, N/V, or other issues at this time. Generally feels well today.     OBJECTIVE:  - See prenatal vitals flow  -   Vitals:    18 1058   BP: 120/70   Weight: 114.8 kg (253 lb)      Labs - normal 3 hour glucose  US normal fetal survey            ASSESSMENT:   - IUP at 29w5d    - S>D   -   Patient Active Problem List    Diagnosis Date Noted   • Encounter for supervision of other normal pregnancy, first trimester 2018   • History of macrosomia in infant - 8lb 14oz, early 1hr GTT elevated, 3hr GTT wnl 2018   • Depression 2012   • Migraine          PLAN:  - S/sx pregnancy and labor warning signs vs general discomforts discussed  - Fetal movements and kick counts reviewed   - Adequate hydration reinforced  - Nutrition/exercise/vitamin education; continued PNV  - Encouraged tour of LnD/childbirth education classes: contact info provided   -tdap given by medical assistant under supervision of physician in clinic today  GC/chlam repeated today.   Growth scan ordered for size greater than dates.

## 2018-08-14 NOTE — PROGRESS NOTES
OB f/u. + fetal movement.  No VB, LOF or UC's.  Wt: 253lb      BP: 120/70  Good phone # [phone]  Preferred pharmacy confirmed.  Tdap today  Kick count sheet given today.  BTL signed today  Patient want to be tested for GC/Ct today

## 2018-08-14 NOTE — LETTER
"Count Your Baby's Movements  Another step to a healthy delivery    Sharyn Peguero             Dept: [phone]    How Many Weeks Pregnant 29w5d    Date to Begin Countin18              How to use this chart    One way for your physician to keep track of your baby's health is by knowing how often the baby moves (or \"kicks\") in your womb.  You can help your physician to do this by using this chart every day.    Every day, you should see how many hours it takes for your baby to move 10 times.  Start in the morning, as soon as you get up.    · First, write down the time your baby moves until you get to 10.  · Check off one box every time your baby moves until you get to 10.  · Write down the time you finished counting in the last column.  · Total how long it took to count up all 10 movements.  · Finally, fill in the box that shows how long this took.  After counting 10 movements, you no longer have to count any more that day.  The next morning, just start counting again as soon as you get up.    What should you call a \"movement\"?  It is hard to say, because it will feel different from one mother to another and from one pregnancy to the next.  The important thing is that you count the movements the same way throughout your pregnancy.  If you have more questions, you should ask your physician.    Count carefully every day!  SAMPLE:  Week 28    How many hours did it take to feel 10 movements?       Start  Time     1     2     3     4     5     6     7     8     9     10   Finish Time   Mon 8:20 ·  ·  ·  ·  ·  ·  ·  ·  ·  ·  11:40                  Sat               Sun                 IMPORTANT: You should contact your physician if it takes more than two hours for you to feel 10 movements.  Each morning, write down the time and start to count the movements of your baby.  Keep track by checking off one box every time you feel one movement.  When you have felt " "10 \"kicks\", write down the time you finished counting in the last column.  Then fill in the   box (over the check ana maria) for the number of hours it took.  Be sure to read the complete instructions on the previous page.            "

## 2018-08-15 DIAGNOSIS — Z34.81 ENCOUNTER FOR SUPERVISION OF OTHER NORMAL PREGNANCY, FIRST TRIMESTER: ICD-10-CM

## 2018-08-17 ENCOUNTER — HOSPITAL ENCOUNTER (OUTPATIENT)
Facility: MEDICAL CENTER | Age: 23
End: 2018-08-17
Attending: OBSTETRICS & GYNECOLOGY | Admitting: OBSTETRICS & GYNECOLOGY

## 2018-08-17 VITALS
BODY MASS INDEX: 40.66 KG/M2 | SYSTOLIC BLOOD PRESSURE: 117 MMHG | HEART RATE: 95 BPM | TEMPERATURE: 97.7 F | DIASTOLIC BLOOD PRESSURE: 75 MMHG | WEIGHT: 253 LBS | HEIGHT: 66 IN

## 2018-08-17 LAB
APPEARANCE UR: ABNORMAL
COLOR UR AUTO: YELLOW
FIBRONECTIN FETAL SPEC QL: NEGATIVE
GLUCOSE UR QL STRIP.AUTO: NEGATIVE MG/DL
KETONES UR QL STRIP.AUTO: NEGATIVE MG/DL
LEUKOCYTE ESTERASE UR QL STRIP.AUTO: ABNORMAL
NITRITE UR QL STRIP.AUTO: NEGATIVE
PH UR STRIP.AUTO: 7 [PH]
PROT UR QL STRIP: 30 MG/DL
RBC UR QL AUTO: ABNORMAL
SP GR UR: 1.02

## 2018-08-17 PROCEDURE — 81002 URINALYSIS NONAUTO W/O SCOPE: CPT

## 2018-08-17 PROCEDURE — 82731 ASSAY OF FETAL FIBRONECTIN: CPT

## 2018-08-17 PROCEDURE — 59025 FETAL NON-STRESS TEST: CPT | Performed by: OBSTETRICS & GYNECOLOGY

## 2018-08-17 NOTE — PROGRESS NOTES
0930 - 23 y/o  EDC 10/25, EGA 30.1, here to LDA 4 with pt's father. C/O small amount of bleeding that was noted in the toilet after voiding first thing this morning. Pt has not reported any bleeding in underwear and denies the need to wear a pad. EFM/TOCO applied, Patient states positive FM. Denies vaginal Bleeding. VSS.  1130 - Dr Mackenzie notified, orders received to collect FFN and perform SVE.  1145 - FFN collected, SVE - 1/thick/high  1300 - FFN negative. Dr Mackenzie notified. Orders received to d/c pt home at this time.   1310 - S. Threats to bedside to discuss discharge instructions. Discharge papers provided to pt. Pt ambulated off unit, stable on feet.

## 2018-08-19 LAB — RUBV IGM SER IA-ACNC: NORMAL

## 2018-09-04 ENCOUNTER — APPOINTMENT (OUTPATIENT)
Dept: RADIOLOGY | Facility: IMAGING CENTER | Age: 23
End: 2018-09-04
Attending: NURSE PRACTITIONER

## 2018-09-04 ENCOUNTER — ROUTINE PRENATAL (OUTPATIENT)
Dept: OBGYN | Facility: CLINIC | Age: 23
End: 2018-09-04

## 2018-09-04 VITALS — WEIGHT: 256 LBS | SYSTOLIC BLOOD PRESSURE: 102 MMHG | DIASTOLIC BLOOD PRESSURE: 60 MMHG | BODY MASS INDEX: 41.32 KG/M2

## 2018-09-04 DIAGNOSIS — Z34.83 ENCOUNTER FOR SUPERVISION OF OTHER NORMAL PREGNANCY, THIRD TRIMESTER: ICD-10-CM

## 2018-09-04 DIAGNOSIS — O09.291 HISTORY OF MACROSOMIA IN INFANT IN PRIOR PREGNANCY, CURRENTLY PREGNANT IN FIRST TRIMESTER: ICD-10-CM

## 2018-09-04 DIAGNOSIS — O26.849 UTERINE SIZE-DATE DISCREPANCY, ANTEPARTUM: ICD-10-CM

## 2018-09-04 PROCEDURE — 90040 PR PRENATAL FOLLOW UP: CPT | Performed by: NURSE PRACTITIONER

## 2018-09-04 PROCEDURE — 76816 OB US FOLLOW-UP PER FETUS: CPT | Mod: 26 | Performed by: OBSTETRICS & GYNECOLOGY

## 2018-09-04 NOTE — PROGRESS NOTES
Pt here today for OB follow up  Pt states having trouble breathing, also having headaches.   Reports +  Good # [phone]  Pharmacy Confirmed.

## 2018-09-04 NOTE — PROGRESS NOTES
SUBJECTIVE:  Pt is a 22 y.o.   at 32w5d  gestation. Presents today for follow-up prenatal care. Reports no issues at this time.  Reports good  fetal movement. Denies cramping/contractions, bleeding or leaking of fluid. Denies dysuria, headaches, N/V, or other issues at this time. Generally feels well today. Report trouble breathing that passes shortly thereafter. Reports this pregnancy is very different than her others and that she is over being pregnant. Is mostly sedentary.     OBJECTIVE:  - See prenatal vitals flow  -   Vitals:    18 1419   BP: 102/60   Weight: 116.1 kg (256 lb)                 ASSESSMENT:   - IUP at 32w5d    - S=D   -   Patient Active Problem List    Diagnosis Date Noted   • Encounter for supervision of other normal pregnancy, third trimester 2018   • History of macrosomia in infant - 8lb 14oz, early 1hr GTT elevated, 3hr GTT wnl 2018   • Depression 2012   • Migraine          PLAN:  - S/sx pregnancy and labor warning signs vs general discomforts discussed  - Fetal movements and kick counts reviewed   - Adequate hydration reinforced  - Nutrition/exercise/vitamin education; continued PNV  - S/p TDAP vacc  - Hydration, nutrition and exercise reviewed, pregnancy support belt as well as the normalcy of pregnancy hormones affecting physiologic processes   - Anticipatory guidance given  - RTC in 2 weeks for follow-up prenatal care  - US today after appt for growth

## 2018-09-07 ENCOUNTER — TELEPHONE (OUTPATIENT)
Dept: OBGYN | Facility: CLINIC | Age: 23
End: 2018-09-07

## 2018-09-07 ENCOUNTER — ROUTINE PRENATAL (OUTPATIENT)
Dept: OBGYN | Facility: CLINIC | Age: 23
End: 2018-09-07

## 2018-09-07 DIAGNOSIS — O40.3XX1 POLYHYDRAMNIOS IN THIRD TRIMESTER COMPLICATION, FETUS 1 OF MULTIPLE GESTATION: ICD-10-CM

## 2018-09-07 LAB
NST ACOUSTIC STIMULATION: NORMAL
NST ACTION NECESSARY: NORMAL
NST ASSESSMENT: NORMAL
NST BASELINE: NORMAL
NST INDICATIONS: NORMAL
NST OTHER DATA: NORMAL
NST READ BY: NORMAL
NST RETURN: NORMAL
NST UTERINE ACTIVITY: NORMAL

## 2018-09-07 PROCEDURE — 59025 FETAL NON-STRESS TEST: CPT | Performed by: OBSTETRICS & GYNECOLOGY

## 2018-09-07 NOTE — TELEPHONE ENCOUNTER
----- Message from Zhou Paul M.D. sent at 9/5/2018  8:25 AM PDT -----  NST twice weekly, deliver at 39 weeks    Unable to contact or leave msg, disconnected     I spoke w/ Lex pt's sister for pt to call back and set up appt for NST's   Lex will notify pt to call us back.  .  Pt notified, scheduled to start on 9/10/18 @ 1300  Not further questions.

## 2018-09-09 ENCOUNTER — HOSPITAL ENCOUNTER (OUTPATIENT)
Facility: MEDICAL CENTER | Age: 23
End: 2018-09-09
Attending: OBSTETRICS & GYNECOLOGY | Admitting: OBSTETRICS & GYNECOLOGY

## 2018-09-09 VITALS
WEIGHT: 250 LBS | DIASTOLIC BLOOD PRESSURE: 76 MMHG | BODY MASS INDEX: 40.18 KG/M2 | HEART RATE: 96 BPM | TEMPERATURE: 97.2 F | SYSTOLIC BLOOD PRESSURE: 130 MMHG | HEIGHT: 66 IN

## 2018-09-09 LAB
ALBUMIN SERPL BCP-MCNC: 3 G/DL (ref 3.2–4.9)
ALP SERPL-CCNC: 95 U/L (ref 30–99)
ALT SERPL-CCNC: 14 U/L (ref 2–50)
APPEARANCE UR: CLEAR
AST SERPL-CCNC: 11 U/L (ref 12–45)
BILIRUB CONJ SERPL-MCNC: <0.1 MG/DL (ref 0.1–0.5)
BILIRUB INDIRECT SERPL-MCNC: ABNORMAL MG/DL (ref 0–1)
BILIRUB SERPL-MCNC: 0.2 MG/DL (ref 0.1–1.5)
COLOR UR AUTO: ABNORMAL
GLUCOSE UR QL STRIP.AUTO: NEGATIVE MG/DL
HCV AB SER QL: NEGATIVE
KETONES UR QL STRIP.AUTO: NEGATIVE MG/DL
LEUKOCYTE ESTERASE UR QL STRIP.AUTO: ABNORMAL
NITRITE UR QL STRIP.AUTO: NEGATIVE
PH UR STRIP.AUTO: 6 [PH]
PROT SERPL-MCNC: 6.6 G/DL (ref 6–8.2)
PROT UR QL STRIP: NEGATIVE MG/DL
RBC UR QL AUTO: NEGATIVE
SP GR UR: 1.02

## 2018-09-09 PROCEDURE — 81002 URINALYSIS NONAUTO W/O SCOPE: CPT

## 2018-09-09 PROCEDURE — 80076 HEPATIC FUNCTION PANEL: CPT

## 2018-09-09 PROCEDURE — 36415 COLL VENOUS BLD VENIPUNCTURE: CPT

## 2018-09-09 PROCEDURE — 59025 FETAL NON-STRESS TEST: CPT | Performed by: OBSTETRICS & GYNECOLOGY

## 2018-09-09 PROCEDURE — 86803 HEPATITIS C AB TEST: CPT

## 2018-09-09 PROCEDURE — 83789 MASS SPECTROMETRY QUAL/QUAN: CPT

## 2018-09-09 RX ORDER — IBUPROFEN 600 MG/1
600 TABLET ORAL EVERY 6 HOURS PRN
Status: CANCELLED | OUTPATIENT
Start: 2018-09-09

## 2018-09-09 RX ORDER — SODIUM CHLORIDE, SODIUM LACTATE, POTASSIUM CHLORIDE, CALCIUM CHLORIDE 600; 310; 30; 20 MG/100ML; MG/100ML; MG/100ML; MG/100ML
INJECTION, SOLUTION INTRAVENOUS PRN
Status: CANCELLED | OUTPATIENT
Start: 2018-09-09

## 2018-09-09 RX ORDER — ACETAMINOPHEN 325 MG/1
650 TABLET ORAL EVERY 4 HOURS PRN
Status: CANCELLED | OUTPATIENT
Start: 2018-09-09

## 2018-09-09 RX ORDER — DOCUSATE SODIUM 100 MG/1
100 CAPSULE, LIQUID FILLED ORAL 2 TIMES DAILY PRN
Status: CANCELLED | OUTPATIENT
Start: 2018-09-09

## 2018-09-09 RX ORDER — BISACODYL 10 MG
10 SUPPOSITORY, RECTAL RECTAL PRN
Status: CANCELLED | OUTPATIENT
Start: 2018-09-09

## 2018-09-09 NOTE — PROGRESS NOTES
Patient comes in with complaints of generalized itching especially the hands and feet.  She has a history of cholestasis of pregnancy with previous pregnancy.  She is also concerned because she was told this is bigger than dates and she has extra fluid.  She just started doing bi-weekly NST's with [facility].  She had a reactive NST on Friday and has another on Tuesday.  This is reviewed with Dr Francois.  Labs drawn and patient educated about follow up as results will not be available today.  Patient denies leaking, bleeding, or contractions.  She states that she doesn't feel as much fetal movement.  Tracing is reactive.  Education on fetal movement and kick counts given.  Discharge order given, patient given discharge instructions to return for leaking, bleeding, contractions, and decreased fetal movement.  Patient ambulated out.

## 2018-09-09 NOTE — PROGRESS NOTES
"[facility] TRIAGE PROGRESS NOTE    PATIENT ID:  NAME:  Sharyn Mejias  MRN:               [id number]  YOB: 1995     22 y.o. female  at 33w3d.    Subjective: Pt presents with generalized pruritus for 3-4 days. She has h/o cholestasis in previous pregnancy.   Pregnancy complicated by polyhydramnios and fetal S>D.     negative  For CTXS  Positive for mild pain of lower back  negative for LOF  negative for vaginal bleeding.   positive for fetal movement    ROS: Patient denies any fever chills, nausea, vomiting, headache, chest pain, shortness of breath, or dysuria or unusual swelling of hands or feet.     Objective:    Vitals:    18 1200 18 1201   BP: 130/76    Pulse: 96    Temp:  36.2 °C (97.2 °F)   Weight:  113.4 kg (250 lb)   Height:  1.676 m (5' 6\")     Temp (24hrs), Av.2 °C (97.2 °F), Min:36.2 °C (97.2 °F), Max:36.2 °C (97.2 °F)    General: No acute distress, resting comfortably in bed.  HEENT: normocephalic, nontraumatic, PERRLA, EOMI  Cardiovascular: Heart RRR with no murmurs, rubs or gallops. Distal Pulses 2+  Respiratory: symmetric chest expansion, lungs CTAB, with no wheezes, rales, rhonci  Abdomen: gravid, nontender  Musculoskeletal: strength 5/5 in four extremities  Neuro: non focal with no numbness, tingling or changes in sensation    Cervix:  Deferred  [place]: Uterine Contractions Not visible on toco   FHRM: Reactive, reassuring tracing    Assessment: 22 y.o. female  at 33w3d with generalized pruritus and h/o cholestasis in prior pregnancy. No signs of labor.    Plan:   1. Discharge home with return precautions.  2. Ordered bile acids, hepatic function panel, HCV antibody  3. Recommend topical hydrocortisone and benadryl.   4. Keep f/u appointment on 2018 to review lab results and start therapy if c/w cholestasis.      Martita Francois MD  Family Medicine, PGY-1       Discussed case with Dr. Walker Stephen, [facility] Attending. Case was discussed " and attending agreed with plan prior to discharge of patient.

## 2018-09-11 ENCOUNTER — ROUTINE PRENATAL (OUTPATIENT)
Dept: OBGYN | Facility: CLINIC | Age: 23
End: 2018-09-11

## 2018-09-11 DIAGNOSIS — O40.9XX0 POLYHYDRAMNIOS, ANTEPARTUM, SINGLE OR UNSPECIFIED FETUS: ICD-10-CM

## 2018-09-11 LAB
NST ACOUSTIC STIMULATION: NO
NST ACTION NECESSARY: NORMAL
NST ASSESSMENT: REACTIVE
NST BASELINE: 140
NST INDICATIONS: NORMAL
NST OTHER DATA: NORMAL
NST READ BY: NORMAL
NST RETURN: NORMAL
NST UTERINE ACTIVITY: NO

## 2018-09-11 PROCEDURE — 59025 FETAL NON-STRESS TEST: CPT | Performed by: OBSTETRICS & GYNECOLOGY

## 2018-09-11 PROCEDURE — 90040 PR PRENATAL FOLLOW UP: CPT | Performed by: OBSTETRICS & GYNECOLOGY

## 2018-09-14 ENCOUNTER — ROUTINE PRENATAL (OUTPATIENT)
Dept: OBGYN | Facility: CLINIC | Age: 23
End: 2018-09-14

## 2018-09-14 DIAGNOSIS — O40.9XX0 POLYHYDRAMNIOS, ANTEPARTUM, SINGLE OR UNSPECIFIED FETUS: ICD-10-CM

## 2018-09-14 LAB
NST ACOUSTIC STIMULATION: NO
NST ACTION NECESSARY: NORMAL
NST ASSESSMENT: NORMAL
NST BASELINE: NORMAL
NST INDICATIONS: NORMAL
NST OTHER DATA: NORMAL
NST READ BY: NORMAL
NST RETURN: NORMAL
NST UTERINE ACTIVITY: NO

## 2018-09-14 PROCEDURE — 59025 FETAL NON-STRESS TEST: CPT | Performed by: OBSTETRICS & GYNECOLOGY

## 2018-09-15 LAB
BILE AC SERPL-SCNC: 2.2 UMOL/L (ref 0–7)
CDCAE SERPL-SCNC: 0.6 UMOL/L (ref 0–3.4)
CHOLATE SERPL-SCNC: 0.3 UMOL/L (ref 0–1.9)
DO-CHOLATE SERPL-SCNC: 1 UMOL/L (ref 0–2.5)
URSODEOXYCHOLATE SERPL-SCNC: 0.3 UMOL/L (ref 0–1)

## 2018-09-16 ENCOUNTER — HOSPITAL ENCOUNTER (OUTPATIENT)
Facility: MEDICAL CENTER | Age: 23
End: 2018-09-16
Attending: OBSTETRICS & GYNECOLOGY | Admitting: OBSTETRICS & GYNECOLOGY

## 2018-09-16 VITALS — SYSTOLIC BLOOD PRESSURE: 117 MMHG | DIASTOLIC BLOOD PRESSURE: 88 MMHG | HEART RATE: 102 BPM

## 2018-09-16 PROCEDURE — 59025 FETAL NON-STRESS TEST: CPT | Performed by: NURSE PRACTITIONER

## 2018-09-17 NOTE — PROGRESS NOTES
2006 23y/o  edc 10/25/2018, EGA 34 3/7, Here to l&d room LDA  with 3. C/O Friend. EFM/TOCO applied, patients states decreased fetal movement, no bleeding or leaking of fluid.    YANETH Lake CNM updated and orders to discharge home   Discharge instructions provided   patient off floor stable on feet

## 2018-09-18 ENCOUNTER — ROUTINE PRENATAL (OUTPATIENT)
Dept: OBGYN | Facility: CLINIC | Age: 23
End: 2018-09-18

## 2018-09-18 VITALS — BODY MASS INDEX: 41.97 KG/M2 | SYSTOLIC BLOOD PRESSURE: 120 MMHG | DIASTOLIC BLOOD PRESSURE: 73 MMHG | WEIGHT: 260 LBS

## 2018-09-18 DIAGNOSIS — O40.9XX0 POLYHYDRAMNIOS, ANTEPARTUM, SINGLE OR UNSPECIFIED FETUS: ICD-10-CM

## 2018-09-18 DIAGNOSIS — Z34.83 ENCOUNTER FOR SUPERVISION OF OTHER NORMAL PREGNANCY, THIRD TRIMESTER: ICD-10-CM

## 2018-09-18 DIAGNOSIS — O09.291 HISTORY OF MACROSOMIA IN INFANT IN PRIOR PREGNANCY, CURRENTLY PREGNANT IN FIRST TRIMESTER: ICD-10-CM

## 2018-09-18 DIAGNOSIS — O09.90 HIGH RISK PREGNANCY, ANTEPARTUM: ICD-10-CM

## 2018-09-18 LAB
NST ACOUSTIC STIMULATION: NORMAL
NST ACTION NECESSARY: NORMAL
NST ASSESSMENT: NORMAL
NST BASELINE: 140
NST INDICATIONS: NORMAL
NST OTHER DATA: NORMAL
NST READ BY: NORMAL
NST RETURN: NORMAL
NST UTERINE ACTIVITY: NORMAL

## 2018-09-18 PROCEDURE — 90040 PR PRENATAL FOLLOW UP: CPT | Performed by: NURSE PRACTITIONER

## 2018-09-18 NOTE — PROGRESS NOTES
SUBJECTIVE:  Pt is a 22 y.o.   at 34w5d  gestation. Presents today for follow-up prenatal care. Reports no issues at this time.  Reports good  fetal movement. Denies cramping/contractions, bleeding or leaking of fluid. Denies dysuria, headaches, N/V, or other issues at this time. Generally feels well today.     OBJECTIVE:  - See prenatal vitals flow  -   Vitals:    18 1305   BP: 120/73   Weight: 117.9 kg (260 lb)      Labs - normal prenatal labs  US polyhydramnios           ASSESSMENT:   - IUP at 34w5d    - S=D   -   Patient Active Problem List    Diagnosis Date Noted   • Polyhydramnios 2018   • Encounter for supervision of other normal pregnancy, third trimester 2018   • History of macrosomia in infant - 8lb 14oz, early 1hr GTT elevated, 3hr GTT wnl 2018   • Depression 2012   • Migraine          PLAN:  - S/sx pregnancy and labor warning signs vs general discomforts discussed  - Fetal movements and kick counts reviewed   - Adequate hydration reinforced  - Nutrition/exercise/vitamin education; continued PNV  -continue 2 x week nst  Anticipate IOL at 39 weeks.

## 2018-09-18 NOTE — PROGRESS NOTES
Pt here today for OB follow up  Pt states seen in L&D for decreased fetal movement 9/16/18,reviewed Kick counts  Reports +FM  Good # [phone]  Pharmacy Confirmed.  Chaperone offered and none needed.

## 2018-09-21 ENCOUNTER — TELEPHONE (OUTPATIENT)
Dept: OBGYN | Facility: CLINIC | Age: 23
End: 2018-09-21

## 2018-09-21 ENCOUNTER — HOSPITAL ENCOUNTER (OUTPATIENT)
Facility: MEDICAL CENTER | Age: 23
End: 2018-09-21
Attending: OBSTETRICS & GYNECOLOGY | Admitting: OBSTETRICS & GYNECOLOGY

## 2018-09-21 VITALS — SYSTOLIC BLOOD PRESSURE: 126 MMHG | DIASTOLIC BLOOD PRESSURE: 77 MMHG | HEART RATE: 91 BPM

## 2018-09-21 LAB
APPEARANCE UR: CLEAR
COLOR UR AUTO: YELLOW
GLUCOSE UR QL STRIP.AUTO: NEGATIVE MG/DL
KETONES UR QL STRIP.AUTO: NEGATIVE MG/DL
LEUKOCYTE ESTERASE UR QL STRIP.AUTO: ABNORMAL
NITRITE UR QL STRIP.AUTO: NEGATIVE
PH UR STRIP.AUTO: 7 [PH]
PROT UR QL STRIP: NEGATIVE MG/DL
RBC UR QL AUTO: NEGATIVE
SP GR UR: 1.02

## 2018-09-21 PROCEDURE — 81002 URINALYSIS NONAUTO W/O SCOPE: CPT

## 2018-09-21 PROCEDURE — 99213 OFFICE O/P EST LOW 20 MIN: CPT | Performed by: OBSTETRICS & GYNECOLOGY

## 2018-09-21 PROCEDURE — 59025 FETAL NON-STRESS TEST: CPT

## 2018-09-21 NOTE — L&D DELIVERY NOTE
Sharyn Mejias, a  at 35w1d with an KIRA of 10/25/2018, by Ultrasound, was seen at LABOR & DELIVERY [facility] for a nonstress test.    Nonstress Test  Reason for NST: Labor Evaluation  Variability: Moderate  Decelerations: None  Accelerations: Yes  Acoustic Stimulator: No  Baseline: 135  Uterine Irritability: No  Contractions: Not present  Nonstress Test Interpretation  $ Nonstress Test Interpretation - Fetus A: Reactive  NST Interpreted By: MATHEUS Mendoza MD

## 2018-09-21 NOTE — PROGRESS NOTES
22 y.o.    Edc=10/25  35.1 weeks    TOCO and US on.  VSS.  Pt presents to triage with c/o VB and decreased FM.  Pt denies painful UCs or LOF.  Pt took a picture of the recent bleeding, moderate amount of bright red blood seen on TP after wiping.  SVE=1/thick, no blood seen on glove.  UA done-WNL, no blood.  0950-Report to Dr. Mendoza and Dr. Francois.  Dr. Francois in room to see pt.  Orders to dc pt home with precautions and kick count instructions.

## 2018-09-21 NOTE — PROGRESS NOTES
[facility] LABOR AND DELIVERY TRIAGE PROGRESS NOTE    PATIENT ID:  NAME:  Sharyn Mejias  MRN:               [id number]  YOB: 1995     22 y.o. female  at 35w1d by LMP and confirmed by 10 week U/S.    Subjective: Pt reports bright red blood on toilet paper after using restroom this morning and decreased FM. She denies seeing blood in underwear or in toilet bowl. She denies contractions, LOF, abdominal pain, presyncope, or other episodes of vaginal bleeding during this pregnancy. Ultrasounds from this pregnancy reviewed (on 18, 18, and 18), all showing no evidence of placenta previa.    Pregnancy complicated by polyhydramnios, getting twice weekly NSTs. Next NST scheduled 2018 at [facility] and next [facility] appointment 2018 per patient.     negative  For CTXS.   negative Feels pain   negative for LOF  positive for vaginal bleeding, as described above.   positive for fetal movement    ROS: Patient denies any fever chills, nausea, vomiting, headache, chest pain, shortness of breath, or dysuria or unusual swelling of hands or feet.     Objective:    There were no vitals filed for this visit.  No data recorded.    General: No acute distress, resting comfortably in bed.  HEENT: normocephalic, nontraumatic  Abdomen: gravid, nontender  Neuro: non focal     Cervix:  1 cm, thick, no blood on glove following exam completed by RN  [place]: No Uterine Contractions on toco  FHRM: Baseline 140s, moderate variability, + accels, no decels    Results for orders placed or performed during the hospital encounter of 18   POC UA   Result Value Ref Range    POC Color Yellow     POC Appearance Clear     POC Glucose Negative Negative mg/dL    POC Ketones Negative Negative mg/dL    POC Specific Gravity 1.020 1.005 - 1.030    POC Blood Negative Negative    POC Urine PH 7.0 5.0 - 8.0    POC Protein Negative Negative mg/dL    POC Nitrites Negative Negative    POC Leukocyte Esterase Trace (A) Negative      Assessment: 22 y.o. female  at 35w1d who presents with one episode vaginal bleeding and decreased fetal movement. No active bleeding, asymptomatic. No signs of labor. Category 1 FHRT despite decrease in perceived fetal movement.    Plan:   1. Patient is cleared to return home with instructions for pelvic rest. Fetal kick counts reviewed and patient.  2.  Encouraged to see MD for additional episodes of vaginal bleeding, increased painful uterine contractions @ 3-5, loss of fluid, or other serious symptoms.  3. F/u for NST scheduled on  and routine OB appointment on     Discussed case with Dr. Mendoza, [facility] Attending. Case was discussed and attending agreed with plan prior to discharge of patient.    Martita Francois M.D.

## 2018-09-21 NOTE — TELEPHONE ENCOUNTER
Pt called triage line stating she is 35 weeks pregnant with polyhydramnios states woke up this morning at 3:30 am with heavy bleeding, went to the bathroom again at 4:00 am and had a bloody show. States having + FM denies leaking of fluid or contractions. Wants to know if she should go to L&D. Instructed pt that due to her being  and having polyhydramnios she needs to go to [facility] L&[facility] for further evaluation per consult with Dr. Cydney Mackenzie. Pt verbalized understanding and will comply pt had no further questions or concerns.

## 2018-09-24 ENCOUNTER — ROUTINE PRENATAL (OUTPATIENT)
Dept: OBGYN | Facility: CLINIC | Age: 23
End: 2018-09-24

## 2018-09-24 DIAGNOSIS — O40.3XX1 POLYHYDRAMNIOS IN THIRD TRIMESTER COMPLICATION, FETUS 1 OF MULTIPLE GESTATION: ICD-10-CM

## 2018-09-24 PROCEDURE — 90040 PR PRENATAL FOLLOW UP: CPT | Performed by: NURSE PRACTITIONER

## 2018-09-27 ENCOUNTER — ROUTINE PRENATAL (OUTPATIENT)
Dept: OBGYN | Facility: CLINIC | Age: 23
End: 2018-09-27

## 2018-09-27 ENCOUNTER — HOSPITAL ENCOUNTER (OUTPATIENT)
Facility: MEDICAL CENTER | Age: 23
End: 2018-09-27
Attending: NURSE PRACTITIONER
Payer: COMMERCIAL

## 2018-09-27 VITALS — SYSTOLIC BLOOD PRESSURE: 112 MMHG | BODY MASS INDEX: 42.77 KG/M2 | DIASTOLIC BLOOD PRESSURE: 72 MMHG | WEIGHT: 265 LBS

## 2018-09-27 DIAGNOSIS — Z34.83 ENCOUNTER FOR SUPERVISION OF OTHER NORMAL PREGNANCY, THIRD TRIMESTER: Primary | ICD-10-CM

## 2018-09-27 DIAGNOSIS — O40.3XX1 POLYHYDRAMNIOS IN THIRD TRIMESTER COMPLICATION, FETUS 1 OF MULTIPLE GESTATION: Primary | ICD-10-CM

## 2018-09-27 DIAGNOSIS — Z34.83 ENCOUNTER FOR SUPERVISION OF OTHER NORMAL PREGNANCY, THIRD TRIMESTER: ICD-10-CM

## 2018-09-27 LAB
NST ACOUSTIC STIMULATION: NORMAL
NST ACTION NECESSARY: NORMAL
NST ASSESSMENT: NORMAL
NST BASELINE: 130
NST INDICATIONS: NORMAL
NST OTHER DATA: NORMAL
NST READ BY: NORMAL
NST RETURN: NORMAL
NST UTERINE ACTIVITY: NORMAL

## 2018-09-27 PROCEDURE — 59025 FETAL NON-STRESS TEST: CPT | Performed by: NURSE PRACTITIONER

## 2018-09-27 PROCEDURE — 90040 PR PRENATAL FOLLOW UP: CPT | Performed by: NURSE PRACTITIONER

## 2018-09-27 NOTE — PATIENT INSTRUCTIONS
P:  1.  GBS obtained.          2.  Labor precautions given.  Instructions given on where to go.  Pt receptive to              education.          3.  Questions answered.          4.  D/w pt policies about IOL for polyhydramnios.        5.  Continue FKCs.          6.  Encouraged adequate water intake        7.  F/u 1 wk GERTRUDE, cont 2x/wk NST.          8.  PP contraception BTL or IUD.

## 2018-09-27 NOTE — PROGRESS NOTES
S:  Pt is  at 36w0d here for routine OB follow up.  Reports questionable LOF.  Reports decr FM.  Denies VB, RUCs, or vaginal DC.     O:  Please see above vitals.        FHTs: 160        Fundal ht: 36 cm.        Fetal position: vertex.        SSE: neg ferning, neg nitrazine, neg pooling.         SVE: 50/-2        NST obtained: baseline 130s +accels -decels mod variability.        [place]: Quiet    A:  IUP at 36w0d  Reactive NST, cat I FHTs  Patient Active Problem List    Diagnosis Date Noted   • Polyhydramnios 2018   • Encounter for supervision of other normal pregnancy, third trimester 2018   • History of macrosomia in infant - 8lb 14oz, early 1hr GTT elevated, 3hr GTT wnl 2018   • Depression 2012   • Migraine        P:  1.  GBS obtained.          2.  Labor precautions given.  Instructions given on where to go.  Pt receptive to              education.          3.  Questions answered.          4.  D/w pt policies about IOL for polyhydramnios.        5.  Continue FKCs.          6.  Encouraged adequate water intake        7.  F/u 1 wk GERTRUDE, cont 2x/wk NST.          8.  PP contraception BTL or IUD.    Chaperone offered and provided by Sheila Sheriff MA.

## 2018-09-27 NOTE — PROGRESS NOTES
Pt here today for OB follow up  Pt states no complaints   Reports +  Good # [phone]  Pharmacy Confirmed.  Chaperone offered and accepted.   GBS today

## 2018-09-29 LAB — GP B STREP DNA SPEC QL NAA+PROBE: NEGATIVE

## 2018-10-01 ENCOUNTER — ROUTINE PRENATAL (OUTPATIENT)
Dept: OBGYN | Facility: CLINIC | Age: 23
End: 2018-10-01

## 2018-10-01 DIAGNOSIS — O40.9XX0 POLYHYDRAMNIOS, ANTEPARTUM, SINGLE OR UNSPECIFIED FETUS: ICD-10-CM

## 2018-10-01 PROCEDURE — 90040 PR PRENATAL FOLLOW UP: CPT | Performed by: NURSE PRACTITIONER

## 2018-10-04 ENCOUNTER — ROUTINE PRENATAL (OUTPATIENT)
Dept: OBGYN | Facility: CLINIC | Age: 23
End: 2018-10-04

## 2018-10-04 VITALS — SYSTOLIC BLOOD PRESSURE: 110 MMHG | DIASTOLIC BLOOD PRESSURE: 72 MMHG | BODY MASS INDEX: 43.05 KG/M2 | WEIGHT: 266.7 LBS

## 2018-10-04 DIAGNOSIS — Z34.83 ENCOUNTER FOR SUPERVISION OF OTHER NORMAL PREGNANCY, THIRD TRIMESTER: Primary | ICD-10-CM

## 2018-10-04 DIAGNOSIS — O40.3XX1 POLYHYDRAMNIOS IN THIRD TRIMESTER COMPLICATION, FETUS 1 OF MULTIPLE GESTATION: ICD-10-CM

## 2018-10-04 LAB
NST ACOUSTIC STIMULATION: NO
NST ACTION NECESSARY: NORMAL
NST ASSESSMENT: NORMAL
NST BASELINE: 130
NST INDICATIONS: NORMAL
NST OTHER DATA: NORMAL
NST READ BY: NORMAL
NST RETURN: NORMAL
NST UTERINE ACTIVITY: NORMAL

## 2018-10-04 PROCEDURE — 90040 PR PRENATAL FOLLOW UP: CPT | Performed by: NURSE PRACTITIONER

## 2018-10-04 PROCEDURE — 59025 FETAL NON-STRESS TEST: CPT | Performed by: NURSE PRACTITIONER

## 2018-10-04 NOTE — PROGRESS NOTES
Patient here today for OB follow up @ 30epu3t, denies VB and LOF.  Patient complains of decrease FM, also complains of constipation  Flu vaccine offered, patient declined.  Pharm verified  Good # [phone]

## 2018-10-08 ENCOUNTER — ROUTINE PRENATAL (OUTPATIENT)
Dept: OBGYN | Facility: CLINIC | Age: 23
End: 2018-10-08

## 2018-10-08 DIAGNOSIS — O40.9XX0 POLYHYDRAMNIOS, ANTEPARTUM, SINGLE OR UNSPECIFIED FETUS: ICD-10-CM

## 2018-10-08 PROCEDURE — 59025 FETAL NON-STRESS TEST: CPT | Performed by: NURSE PRACTITIONER

## 2018-10-08 NOTE — PROGRESS NOTES
S: Pt is a 22 y.o.  at 37w4d with  Estimated Date of Delivery: 10/25/18 who presents for scheduled NST for polyhydramnios. No complaints.  Denies VB, RUCs, LOF.  Reports good FM.      O: LMP 2017 (Exact Date)          FHTs: baseline 130, accels positive,  decels variable x 1,  Variability moderate       [place]: No UCs           A/P  Patient Active Problem List    Diagnosis Date Noted   • Polyhydramnios 2018   • Encounter for supervision of other normal pregnancy, third trimester 2018   • History of macrosomia in infant - 8lb 14oz, early 1hr GTT elevated, 3hr GTT wnl 2018   • Depression 2012   • Migraine        1.  IUP @ 37w4d  2.  Reactive, category 1 NST.  3.  F/u as sched.  4.  Continue 2x weekly NST's

## 2018-10-09 ENCOUNTER — TELEPHONE (OUTPATIENT)
Dept: OBGYN | Facility: CLINIC | Age: 23
End: 2018-10-09

## 2018-10-09 ENCOUNTER — HOSPITAL ENCOUNTER (OUTPATIENT)
Facility: MEDICAL CENTER | Age: 23
End: 2018-10-09
Attending: OBSTETRICS & GYNECOLOGY | Admitting: OBSTETRICS & GYNECOLOGY

## 2018-10-09 VITALS
HEIGHT: 66 IN | DIASTOLIC BLOOD PRESSURE: 57 MMHG | TEMPERATURE: 96.9 F | WEIGHT: 266.7 LBS | BODY MASS INDEX: 42.86 KG/M2 | HEART RATE: 99 BPM | SYSTOLIC BLOOD PRESSURE: 98 MMHG

## 2018-10-09 LAB
ALBUMIN SERPL BCP-MCNC: 3 G/DL (ref 3.2–4.9)
ALBUMIN/GLOB SERPL: 0.9 G/DL
ALP SERPL-CCNC: 126 U/L (ref 30–99)
ALT SERPL-CCNC: 14 U/L (ref 2–50)
ANION GAP SERPL CALC-SCNC: 9 MMOL/L (ref 0–11.9)
APPEARANCE UR: ABNORMAL
AST SERPL-CCNC: 14 U/L (ref 12–45)
BILIRUB SERPL-MCNC: 0.2 MG/DL (ref 0.1–1.5)
BUN SERPL-MCNC: 8 MG/DL (ref 8–22)
CALCIUM SERPL-MCNC: 8.6 MG/DL (ref 8.5–10.5)
CHLORIDE SERPL-SCNC: 104 MMOL/L (ref 96–112)
CO2 SERPL-SCNC: 20 MMOL/L (ref 20–33)
COLOR UR AUTO: ABNORMAL
CREAT SERPL-MCNC: 0.48 MG/DL (ref 0.5–1.4)
CREAT UR-MCNC: 213.8 MG/DL
ERYTHROCYTE [DISTWIDTH] IN BLOOD BY AUTOMATED COUNT: 39.3 FL (ref 35.9–50)
GLOBULIN SER CALC-MCNC: 3.5 G/DL (ref 1.9–3.5)
GLUCOSE SERPL-MCNC: 114 MG/DL (ref 65–99)
GLUCOSE UR QL STRIP.AUTO: NEGATIVE MG/DL
HCT VFR BLD AUTO: 36.6 % (ref 37–47)
HGB BLD-MCNC: 12 G/DL (ref 12–16)
KETONES UR QL STRIP.AUTO: NEGATIVE MG/DL
LEUKOCYTE ESTERASE UR QL STRIP.AUTO: ABNORMAL
MCH RBC QN AUTO: 27.2 PG (ref 27–33)
MCHC RBC AUTO-ENTMCNC: 32.8 G/DL (ref 33.6–35)
MCV RBC AUTO: 83 FL (ref 81.4–97.8)
NITRITE UR QL STRIP.AUTO: NEGATIVE
PH UR STRIP.AUTO: 5.5 [PH]
PLATELET # BLD AUTO: 304 K/UL (ref 164–446)
PMV BLD AUTO: 10.6 FL (ref 9–12.9)
POTASSIUM SERPL-SCNC: 3.2 MMOL/L (ref 3.6–5.5)
PROT SERPL-MCNC: 6.5 G/DL (ref 6–8.2)
PROT UR QL STRIP: ABNORMAL MG/DL
PROT UR-MCNC: 28.5 MG/DL (ref 0–15)
PROT/CREAT UR: 133 MG/G (ref 10–107)
RBC # BLD AUTO: 4.41 M/UL (ref 4.2–5.4)
RBC UR QL AUTO: NEGATIVE
SODIUM SERPL-SCNC: 133 MMOL/L (ref 135–145)
SP GR UR: >=1.03
URATE SERPL-MCNC: 5 MG/DL (ref 1.9–8.2)
WBC # BLD AUTO: 11.2 K/UL (ref 4.8–10.8)

## 2018-10-09 PROCEDURE — 84550 ASSAY OF BLOOD/URIC ACID: CPT

## 2018-10-09 PROCEDURE — 84156 ASSAY OF PROTEIN URINE: CPT

## 2018-10-09 PROCEDURE — 59025 FETAL NON-STRESS TEST: CPT

## 2018-10-09 PROCEDURE — 85027 COMPLETE CBC AUTOMATED: CPT

## 2018-10-09 PROCEDURE — 80053 COMPREHEN METABOLIC PANEL: CPT

## 2018-10-09 PROCEDURE — 82570 ASSAY OF URINE CREATININE: CPT

## 2018-10-09 PROCEDURE — 36415 COLL VENOUS BLD VENIPUNCTURE: CPT

## 2018-10-09 PROCEDURE — 81002 URINALYSIS NONAUTO W/O SCOPE: CPT

## 2018-10-09 PROCEDURE — 99213 OFFICE O/P EST LOW 20 MIN: CPT | Mod: 25 | Performed by: OBSTETRICS & GYNECOLOGY

## 2018-10-09 PROCEDURE — 59025 FETAL NON-STRESS TEST: CPT | Performed by: OBSTETRICS & GYNECOLOGY

## 2018-10-09 PROCEDURE — 59025 FETAL NON-STRESS TEST: CPT | Mod: 26 | Performed by: OBSTETRICS & GYNECOLOGY

## 2018-10-09 NOTE — PROGRESS NOTES
21yo, , edc10/25, 37.5 presents with c/o generally feeling unwell, dizzy, headache, vision changes and UCs. Pt denies LOF, vag bleeding. POS fm. EFM and [facility] placed.   1450 Dr. Gomez at bedside.   170 Lab here to collect labs.    Dr. Carey  Updated. Dr. Hager at bedside. Okay to discharge to home.     Discharge instructions given, pt states understanding. Reactive strip obtained. Pt discharged to home  in stable condition, ambulatory, with family.

## 2018-10-09 NOTE — TELEPHONE ENCOUNTER
10/9/18 1212 - Pt LM requesting a call back.    10/9/18 1500 Called pt, she is c/o not feeling good x 2 days with HA, swollen feet, + vision changes and seeing spots, feeling tried and fatigues, states she tool BP at store and it was 130/90. Consulted with midwife Dayna Cortez and she recommends patient to go to L&D for further evaluation. Pt notified, voiced understanding and will comply. Pt had no other questions or concerns.

## 2018-10-10 NOTE — PROGRESS NOTES
History and Physical    Sharyn Mejias is a 22 y.o. female  at 37w5d who presents for brief episode of dizziness and floaters across her eyes. It was associated with a headache. She does have a history of headaches however they usually resolve with a nap. She did not take any medications for this. Denies nausea, vomiting, right upper quadrant pain, and swelling of hands/face.  Of note, she was found to have poly hydramnios on a growth scan (TYREL 24.8) and has been getting weekly NST which are all reassuring.    Subjective:   CTXs: negative   Pain: negative  LOF: negative  Vaginal bleeding: negative   Fetal movement: positive    ROS: Pertinent positives documented in HPI and all other systems reviewed & are negative    OB History    Para Term  AB Living   4 3 3     3   SAB TAB Ectopic Molar Multiple Live Births             3      # Outcome Date GA Lbr Chad/2nd Weight Sex Delivery Anes PTL Lv   4 Current            3 Term 14 39w0d  3.856 kg (8 lb 8 oz) F Vag-Spont   SANCHEZ   2 Term 12 41w2d  3.544 kg (7 lb 13 oz) M Vag-Spont EPI  SANCHEZ      Birth Comments: baby was not breathing when born    1 Term 07/01/10 40w6d 04:00 4.026 kg (8 lb 14 oz) F Vag-Spont None N SANCHEZ      Birth Comments: no complications          Past Medical History:   Diagnosis Date   • Depression     postpartum depression   • Ovarian cyst 13     No past surgical history on file.  No current facility-administered medications for this encounter.     Allergies: Nkda [no known drug allergy]    Social History     Social History   • Marital status:      Spouse name: N/A   • Number of children: N/A   • Years of education: N/A     Occupational History   • Not on file.     Social History Main Topics   • Smoking status: Never Smoker   • Smokeless tobacco: Never Used   • Alcohol use No   • Drug use: No   • Sexual activity: Yes     Partners: Male     Birth control/ protection: Surgical      Comment:  "desires postpartum btl.      Other Topics Concern   • Not on file     Social History Narrative    ** Merged History Encounter **            FamHx:   Congenital anomalies denies  Chromosomal disorders denies  Inherited MR denies  Blood dyscrasias denies    Prenatal care with [facility] with following problems:  Patient Active Problem List    Diagnosis Date Noted   • Polyhydramnios 09/18/2018   • Encounter for supervision of other normal pregnancy, third trimester 03/30/2018   • History of macrosomia in infant - 8lb 14oz, early 1hr GTT elevated, 3hr GTT wnl 03/30/2018   • Depression 05/14/2012   • Migraine          Objective:      Blood pressure (!) 98/57, pulse 99, temperature 36.1 °C (96.9 °F), temperature source Temporal, height 1.676 m (5' 6\"), weight 121 kg (266 lb 11.2 oz), last menstrual period 01/18/2017, not currently breastfeeding.    General:   no acute distress, alert, cooperative   Skin:   normal   HEENT:  PERRLA           Abdomen:   soft, gravid, NT   EFW:  2480 gr at 32-5weeks. (92%)   Pelvis:  adequate with gynecoid pelvis   FHT:  130 BPM  Accels +  Decels neg  Variability mod  Category I   Uterine Size: S>D   Presentations: Unsure   Cervix: Not assessed                              Lab Review  Lab:   Blood type: A     Recent Results (from the past 5880 hour(s))   CBC WITH DIFFERENTIAL    Collection Time: 02/25/18  2:35 AM   Result Value Ref Range    WBC 11.8 (H) 4.8 - 10.8 K/uL    RBC 4.90 4.20 - 5.40 M/uL    Hemoglobin 14.6 12.0 - 16.0 g/dL    Hematocrit 44.1 37.0 - 47.0 %    MCV 90.0 81.4 - 97.8 fL    MCH 29.8 27.0 - 33.0 pg    MCHC 33.1 (L) 33.6 - 35.0 g/dL    RDW 43.6 35.9 - 50.0 fL    Platelet Count 262 164 - 446 K/uL    MPV 9.7 9.0 - 12.9 fL    Neutrophils-Polys 58.30 44.00 - 72.00 %    Lymphocytes 32.40 22.00 - 41.00 %    Monocytes 6.20 0.00 - 13.40 %    Eosinophils 2.40 0.00 - 6.90 %    Basophils 0.40 0.00 - 1.80 %    Immature Granulocytes 0.30 0.00 - 0.90 %    Nucleated RBC 0.00 /100 WBC    " Neutrophils (Absolute) 6.88 2.00 - 7.15 K/uL    Lymphs (Absolute) 3.82 1.00 - 4.80 K/uL    Monos (Absolute) 0.73 0.00 - 0.85 K/uL    Eos (Absolute) 0.28 0.00 - 0.51 K/uL    Baso (Absolute) 0.05 0.00 - 0.12 K/uL    Immature Granulocytes (abs) 0.04 0.00 - 0.11 K/uL    NRBC (Absolute) 0.00 K/uL   COMP METABOLIC PANEL    Collection Time: 02/25/18  2:35 AM   Result Value Ref Range    Sodium 135 135 - 145 mmol/L    Potassium 4.3 3.6 - 5.5 mmol/L    Chloride 100 96 - 112 mmol/L    Co2 27 20 - 33 mmol/L    Anion Gap 8.0 0.0 - 11.9    Glucose 100 (H) 65 - 99 mg/dL    Bun 12 8 - 22 mg/dL    Creatinine 0.60 0.50 - 1.40 mg/dL    Calcium 9.1 8.5 - 10.5 mg/dL    AST(SGOT) 15 12 - 45 U/L    ALT(SGPT) 12 2 - 50 U/L    Alkaline Phosphatase 64 30 - 99 U/L    Total Bilirubin 0.2 0.1 - 1.5 mg/dL    Albumin 4.0 3.2 - 4.9 g/dL    Total Protein 7.2 6.0 - 8.2 g/dL    Globulin 3.2 1.9 - 3.5 g/dL    A-G Ratio 1.3 g/dL   RH TYPE FOR RHOGAM FROM E.D.    Collection Time: 02/25/18  2:35 AM   Result Value Ref Range    Emergency Department Rh Typing POS     Number Of Rh Doses Indicated ZERO    HCG QUANTITATIVE SERUM    Collection Time: 02/25/18  2:35 AM   Result Value Ref Range    Bhcg 2746.0 (H) 0.0 - 5.0 mIU/mL   ESTIMATED GFR    Collection Time: 02/25/18  2:35 AM   Result Value Ref Range    GFR If African American >60 >60 mL/min/1.73 m 2    GFR If Non African American >60 >60 mL/min/1.73 m 2   URINALYSIS (UA)    Collection Time: 02/25/18  3:30 AM   Result Value Ref Range    Color Yellow     Character Clear     Specific Gravity 1.009 <1.035    Ph 7.0 5.0 - 8.0    Glucose Negative Negative mg/dL    Ketones Negative Negative mg/dL    Protein Negative Negative mg/dL    Bilirubin Negative Negative    Urobilinogen, Urine 0.2 Negative    Nitrite Negative Negative    Leukocyte Esterase Small (A) Negative    Occult Blood Negative Negative    Micro Urine Req Microscopic    URINE MICROSCOPIC (W/UA)    Collection Time: 02/25/18  3:30 AM   Result Value Ref  Range    WBC 5-10 (A) /hpf    RBC 0-2 /hpf    Bacteria Negative None /hpf    Epithelial Cells Few /hpf    Hyaline Cast 0-2 /lpf   CHLAMYDIA/GC PCR URINE OR SWAB    Collection Time: 03/30/18  1:33 PM   Result Value Ref Range    Source Genital     C. trachomatis by PCR POSITIVE (A) Negative    N. gonorrhoeae by PCR Negative Negative   URINALYSIS,CULTURE IF INDICATED    Collection Time: 04/16/18 10:56 AM   Result Value Ref Range    Color Yellow     Character Cloudy (A)     Specific Gravity 1.024 <1.035    Ph 6.0 5.0 - 8.0    Glucose Negative Negative mg/dL    Ketones Negative Negative mg/dL    Protein Negative Negative mg/dL    Bilirubin Negative Negative    Urobilinogen, Urine 0.2 Negative    Nitrite Negative Negative    Leukocyte Esterase Large (A) Negative    Occult Blood Negative Negative    Micro Urine Req Microscopic     Culture Indicated Yes UA Culture   URINE CULTURE(NEW)    Collection Time: 04/16/18 10:56 AM   Result Value Ref Range    Significant Indicator NEG     Source UR     Site      Urine Culture Mixed skin blayne ,000 cfu/mL    URINE MICROSCOPIC (W/UA)    Collection Time: 04/16/18 10:56 AM   Result Value Ref Range    WBC Packed WBC /hpf    RBC 0-2 /hpf    Bacteria Many (A) None /hpf    Epithelial Cells Moderate (A) /hpf    Hyaline Cast 0-2 /lpf   CBC WITH DIFFERENTIAL    Collection Time: 04/16/18 12:40 PM   Result Value Ref Range    WBC 9.3 4.8 - 10.8 K/uL    RBC 4.82 4.20 - 5.40 M/uL    Hemoglobin 14.1 12.0 - 16.0 g/dL    Hematocrit 42.7 37.0 - 47.0 %    MCV 88.6 81.4 - 97.8 fL    MCH 29.3 27.0 - 33.0 pg    MCHC 33.0 (L) 33.6 - 35.0 g/dL    RDW 42.4 35.9 - 50.0 fL    Platelet Count 256 164 - 446 K/uL    MPV 10.4 9.0 - 12.9 fL    Neutrophils-Polys 68.90 44.00 - 72.00 %    Lymphocytes 24.00 22.00 - 41.00 %    Monocytes 4.50 0.00 - 13.40 %    Eosinophils 1.90 0.00 - 6.90 %    Basophils 0.40 0.00 - 1.80 %    Immature Granulocytes 0.30 0.00 - 0.90 %    Nucleated RBC 0.00 /100 WBC    Neutrophils  (Absolute) 6.40 2.00 - 7.15 K/uL    Lymphs (Absolute) 2.23 1.00 - 4.80 K/uL    Monos (Absolute) 0.42 0.00 - 0.85 K/uL    Eos (Absolute) 0.18 0.00 - 0.51 K/uL    Baso (Absolute) 0.04 0.00 - 0.12 K/uL    Immature Granulocytes (abs) 0.03 0.00 - 0.11 K/uL    NRBC (Absolute) 0.00 K/uL   COMP METABOLIC PANEL    Collection Time: 04/16/18 12:40 PM   Result Value Ref Range    Sodium 136 135 - 145 mmol/L    Potassium 3.8 3.6 - 5.5 mmol/L    Chloride 104 96 - 112 mmol/L    Co2 25 20 - 33 mmol/L    Anion Gap 7.0 0.0 - 11.9    Glucose 73 65 - 99 mg/dL    Bun 10 8 - 22 mg/dL    Creatinine 0.51 0.50 - 1.40 mg/dL    Calcium 9.2 8.5 - 10.5 mg/dL    AST(SGOT) 14 12 - 45 U/L    ALT(SGPT) 13 2 - 50 U/L    Alkaline Phosphatase 56 30 - 99 U/L    Total Bilirubin 0.3 0.1 - 1.5 mg/dL    Albumin 3.5 3.2 - 4.9 g/dL    Total Protein 6.9 6.0 - 8.2 g/dL    Globulin 3.4 1.9 - 3.5 g/dL    A-G Ratio 1.0 g/dL   PROTHROMBIN TIME (INR)    Collection Time: 04/16/18 12:40 PM   Result Value Ref Range    PT 12.1 12.0 - 14.6 sec    INR 0.92 0.87 - 1.13   HCG QUANTITATIVE SERUM    Collection Time: 04/16/18 12:40 PM   Result Value Ref Range    Bhcg 26414.4 (H) 0.0 - 5.0 mIU/mL   ESTIMATED GFR    Collection Time: 04/16/18 12:40 PM   Result Value Ref Range    GFR If African American >60 >60 mL/min/1.73 m 2    GFR If Non African American >60 >60 mL/min/1.73 m 2   ANTIBODY,TREPONEMA PALLIDUM    Collection Time: 04/25/18 10:07 AM   Result Value Ref Range    Mha-Tp Reactive (A) Non Reactive   PREG CNTR PRENATAL PN    Collection Time: 04/25/18 11:07 AM   Result Value Ref Range    WBC 8.0 4.8 - 10.8 K/uL    RBC 4.88 4.20 - 5.40 M/uL    Hemoglobin 14.5 12.0 - 16.0 g/dL    Hematocrit 44.2 37.0 - 47.0 %    MCV 90.6 81.4 - 97.8 fL    MCH 29.7 27.0 - 33.0 pg    MCHC 32.8 (L) 33.6 - 35.0 g/dL    RDW 42.1 35.9 - 50.0 fL    Platelet Count 296 164 - 446 K/uL    MPV 11.0 9.0 - 12.9 fL    Neutrophils-Polys 61.80 44.00 - 72.00 %    Lymphocytes 29.40 22.00 - 41.00 %    Monocytes  5.10 0.00 - 13.40 %    Eosinophils 2.60 0.00 - 6.90 %    Basophils 0.70 0.00 - 1.80 %    Immature Granulocytes 0.40 0.00 - 0.90 %    Nucleated RBC 0.00 /100 WBC    Neutrophils (Absolute) 4.97 2.00 - 7.15 K/uL    Lymphs (Absolute) 2.36 1.00 - 4.80 K/uL    Monos (Absolute) 0.41 0.00 - 0.85 K/uL    Eos (Absolute) 0.21 0.00 - 0.51 K/uL    Baso (Absolute) 0.06 0.00 - 0.12 K/uL    Immature Granulocytes (abs) 0.03 0.00 - 0.11 K/uL    NRBC (Absolute) 0.00 K/uL    Color Yellow     Character Cloudy (A)     Specific Gravity 1.027 <1.035    Ph 6.5 5.0 - 8.0    Glucose Negative Negative mg/dL    Ketones Trace (A) Negative mg/dL    Protein 30 (A) Negative mg/dL    Bilirubin Negative Negative    Urobilinogen, Urine 0.2 Negative    Nitrite Negative Negative    Leukocyte Esterase Large (A) Negative    Occult Blood Negative Negative    Micro Urine Req Microscopic     Rubella IgG Antibody 44.30 IU/mL    Hepatitis B Surface Antigen Negative Negative    Syphilis, Treponemal Qual Reactive (A) Non Reactive   GLUCOSE 1HR GESTATIONAL    Collection Time: 04/25/18 11:07 AM   Result Value Ref Range    Glucose, Post Dose 142 (H) 70 - 139 mg/dL   HIV AG/AB COMBO ASSAY SCREENING    Collection Time: 04/25/18 11:07 AM   Result Value Ref Range    HIV Ag/Ab Combo Assay Non Reactive Non Reactive   OP PRENATAL PANEL-BLOOD BANK    Collection Time: 04/25/18 11:07 AM   Result Value Ref Range    ABO Grouping Only A     Rh Grouping Only POS     Antibody Screen Scrn NEG    URINE MICROSCOPIC (W/UA)    Collection Time: 04/25/18 11:07 AM   Result Value Ref Range    -150 (A) /hpf    RBC 0-2 /hpf    Bacteria Many (A) None /hpf    Epithelial Cells Moderate (A) /hpf    Hyaline Cast 0-2 /lpf   RPR (SYPHILIS)    Collection Time: 04/25/18 11:07 AM   Result Value Ref Range    Rapid Plasma Reagin -Rpr- Non Reactive Non Reactive   CHLAMYDIA/GC PCR URINE OR SWAB    Collection Time: 04/27/18  3:02 PM   Result Value Ref Range    Source Urine     C. trachomatis by PCR  Negative Negative    N. gonorrhoeae by PCR Negative Negative   URINALYSIS CULTURE, IF INDICATED    Collection Time: 04/30/18  8:37 PM   Result Value Ref Range    Color Yellow     Character Clear     Specific Gravity 1.015 <1.035    Ph 6.5 5.0 - 8.0    Glucose Negative Negative mg/dL    Ketones Negative Negative mg/dL    Protein Negative Negative mg/dL    Bilirubin Negative Negative    Urobilinogen, Urine 0.2 Negative    Nitrite Negative Negative    Leukocyte Esterase Large (A) Negative    Occult Blood Negative Negative    Micro Urine Req Microscopic    URINE CULTURE(NEW)    Collection Time: 04/30/18  8:37 PM   Result Value Ref Range    Significant Indicator NEG     Source UR     Site      Urine Culture Mixed skin blayne 10-50,000 cfu/mL    URINE MICROSCOPIC (W/UA)    Collection Time: 04/30/18  8:37 PM   Result Value Ref Range    WBC 10-20 (A) /hpf    RBC 0-2 /hpf    Bacteria Moderate (A) None /hpf    Epithelial Cells Few /hpf    Hyaline Cast 0-2 /lpf   GLUCOSE 3 HR GESTATIONAL    Collection Time: 05/02/18  7:39 AM   Result Value Ref Range    Glucose 3 Hour 55 (L) 65 - 140 mg/dL    Glucose 1 Hour 128 65 - 180 mg/dL    Baseline Glucose 87 65 - 95 mg/dL    Glucose 2 Hour 89 65 - 155 mg/dL   THINPREP PAP, REFLEX HPV ON ASC-US ONLY    Collection Time: 05/24/18 11:07 AM   Result Value Ref Range    Cytology Reg See Path Report    POC UA    Collection Time: 06/24/18  5:42 PM   Result Value Ref Range    POC Color Brii     POC Appearance Slightly Cloudy (A)     POC Glucose Negative Negative mg/dL    POC Ketones Negative Negative mg/dL    POC Specific Gravity 1.025 1.005 - 1.030    POC Blood Negative Negative    POC Urine PH 7.0 5.0 - 8.0    POC Protein Trace (A) Negative mg/dL    POC Nitrites Negative Negative    POC Leukocyte Esterase Small (A) Negative   BILE ACIDS, SERUM    Collection Time: 07/30/18  9:54 AM   Result Value Ref Range    Bile Acids Serum 5 0 - 10 umol/L   GLUCOSE 3 HR GESTATIONAL    Collection Time: 07/30/18  10:00 AM   Result Value Ref Range    Baseline Glucose 77 65 - 95 mg/dL    Glucose 3 Hour 82 65 - 140 mg/dL    Glucose 1 Hour 158 65 - 180 mg/dL    Glucose 2 Hour 134 65 - 155 mg/dL   HGB    Collection Time: 07/30/18  1:05 PM   Result Value Ref Range    Hemoglobin 13.0 12.0 - 16.0 g/dL   T.PALLIDUM AB EIA    Collection Time: 07/30/18  1:05 PM   Result Value Ref Range    Syphilis, Treponemal Qual Reactive (A) Non Reactive   HCT    Collection Time: 07/30/18  1:05 PM   Result Value Ref Range    Hematocrit 38.9 37.0 - 47.0 %   RPR (SYPHILIS)    Collection Time: 07/30/18  1:05 PM   Result Value Ref Range    Rapid Plasma Reagin -Rpr- Non Reactive Non Reactive   POC UA    Collection Time: 08/08/18 11:28 AM   Result Value Ref Range    POC Color Yellow     POC Appearance Clear     POC Glucose Negative Negative mg/dL    POC Ketones Negative Negative mg/dL    POC Specific Gravity 1.020 1.005 - 1.030    POC Blood Negative Negative    POC Urine PH 7.0 5.0 - 8.0    POC Protein Negative Negative mg/dL    POC Nitrites Negative Negative    POC Leukocyte Esterase Small (A) Negative   CBC WITH DIFFERENTIAL    Collection Time: 08/08/18 11:56 AM   Result Value Ref Range    WBC 12.4 (H) 4.8 - 10.8 K/uL    RBC 4.20 4.20 - 5.40 M/uL    Hemoglobin 12.3 12.0 - 16.0 g/dL    Hematocrit 34.6 (L) 37.0 - 47.0 %    MCV 82.4 81.4 - 97.8 fL    MCH 29.3 27.0 - 33.0 pg    MCHC 35.5 (H) 33.6 - 35.0 g/dL    RDW 37.6 35.9 - 50.0 fL    Platelet Count 258 164 - 446 K/uL    MPV 10.3 9.0 - 12.9 fL    Neutrophils-Polys 74.60 (H) 44.00 - 72.00 %    Lymphocytes 18.30 (L) 22.00 - 41.00 %    Monocytes 5.00 0.00 - 13.40 %    Eosinophils 1.50 0.00 - 6.90 %    Basophils 0.20 0.00 - 1.80 %    Immature Granulocytes 0.40 0.00 - 0.90 %    Nucleated RBC 0.00 /100 WBC    Neutrophils (Absolute) 9.27 (H) 2.00 - 7.15 K/uL    Lymphs (Absolute) 2.27 1.00 - 4.80 K/uL    Monos (Absolute) 0.62 0.00 - 0.85 K/uL    Eos (Absolute) 0.19 0.00 - 0.51 K/uL    Baso (Absolute) 0.02 0.00 -  0.12 K/uL    Immature Granulocytes (abs) 0.05 0.00 - 0.11 K/uL    NRBC (Absolute) 0.00 K/uL   COMP METABOLIC PANEL    Collection Time: 08/08/18 11:56 AM   Result Value Ref Range    Sodium 131 (L) 135 - 145 mmol/L    Potassium 4.0 3.6 - 5.5 mmol/L    Chloride 103 96 - 112 mmol/L    Co2 24 20 - 33 mmol/L    Anion Gap 4.0 0.0 - 11.9    Glucose 98 65 - 99 mg/dL    Bun 9 8 - 22 mg/dL    Creatinine 0.58 0.50 - 1.40 mg/dL    Calcium 8.6 8.5 - 10.5 mg/dL    AST(SGOT) 13 12 - 45 U/L    ALT(SGPT) 13 2 - 50 U/L    Alkaline Phosphatase 72 30 - 99 U/L    Total Bilirubin 0.3 0.1 - 1.5 mg/dL    Albumin 3.2 3.2 - 4.9 g/dL    Total Protein 6.1 6.0 - 8.2 g/dL    Globulin 2.9 1.9 - 3.5 g/dL    A-G Ratio 1.1 g/dL   URIC ACID    Collection Time: 08/08/18 11:56 AM   Result Value Ref Range    Uric Acid 5.1 1.9 - 8.2 mg/dL   ESTIMATED GFR    Collection Time: 08/08/18 11:56 AM   Result Value Ref Range    GFR If African American >60 >60 mL/min/1.73 m 2    GFR If Non African American >60 >60 mL/min/1.73 m 2   CHLAMYDIA/GC PCR URINE OR SWAB    Collection Time: 08/14/18 11:43 AM   Result Value Ref Range    Source Cervical     C. trachomatis by PCR Negative Negative    N. gonorrhoeae by PCR Negative Negative   POC UA    Collection Time: 08/17/18  9:28 AM   Result Value Ref Range    POC Color Yellow     POC Appearance Cloudy (A)     POC Glucose Negative Negative mg/dL    POC Ketones Negative Negative mg/dL    POC Specific Gravity 1.020 1.005 - 1.030    POC Blood Trace-intact (A) Negative    POC Urine PH 7.0 5.0 - 8.0    POC Protein 30 (A) Negative mg/dL    POC Nitrites Negative Negative    POC Leukocyte Esterase Moderate (A) Negative   fFN if less than 34 wks gestation - must be prior to vaginal exam    Collection Time: 08/17/18 11:45 AM   Result Value Ref Range    Fetal Fibronectin Negative    POCT Fetal Nonstress Test    Collection Time: 09/07/18  3:43 PM   Result Value Ref Range    NST Indications Polyhydramnios     NST Baseline 130s     NST  Uterine Activity none     NST Acoustic Stimulation      NST Assessment Cat 1     NST Action Necessary      NST Other Data      NST Return twice weekly     NST Read By [place]    POC UA    Collection Time: 09/09/18  1:45 PM   Result Value Ref Range    POC Color Brii     POC Appearance Clear     POC Glucose Negative Negative mg/dL    POC Ketones Negative Negative mg/dL    POC Specific Gravity 1.025 1.005 - 1.030    POC Blood Negative Negative    POC Urine PH 6.0 5.0 - 8.0    POC Protein Negative Negative mg/dL    POC Nitrites Negative Negative    POC Leukocyte Esterase Small (A) Negative   HEP C VIRUS ANTIBODY    Collection Time: 09/09/18  2:35 PM   Result Value Ref Range    Hepatitis C Antibody Negative Negative   HEPATIC FUNCTION PANEL    Collection Time: 09/09/18  2:35 PM   Result Value Ref Range    Alkaline Phosphatase 95 30 - 99 U/L    AST(SGOT) 11 (L) 12 - 45 U/L    ALT(SGPT) 14 2 - 50 U/L    Total Bilirubin 0.2 0.1 - 1.5 mg/dL    Direct Bilirubin <0.1 0.1 - 0.5 mg/dL    Indirect Bilirubin see below 0.0 - 1.0 mg/dL    Albumin 3.0 (L) 3.2 - 4.9 g/dL    Total Protein 6.6 6.0 - 8.2 g/dL   BILE ACIDS,FRACTIONATED    Collection Time: 09/09/18  2:35 PM   Result Value Ref Range    Ursodeoxycholic Acid 0.3 0.0 - 1.0 umol/L    Cholic Acid      Chenodeoxycholic Acid 0.6 0.0 - 3.4 umol/L    Deoxycholic Acid 1.0 0.0 - 2.5 umol/L    Bile Acids Serum 2.2 0.0 - 7.0 umol/L   POCT Fetal Nonstress Test    Collection Time: 09/11/18 10:58 AM   Result Value Ref Range    NST Indications Polyhydramnios     NST Baseline 140     NST Uterine Activity No     NST Acoustic Stimulation No     NST Assessment Reactive     NST Action Necessary None     NST Other Data      NST Return      NST Read By     POCT Fetal Nonstress Test    Collection Time: 09/14/18  1:11 PM   Result Value Ref Range    NST Indications Polyhydramnios     NST Baseline 130s     NST Uterine Activity no     NST Acoustic Stimulation no     NST Assessment Reactive NST     NST  Action Necessary      NST Other Data      NST Return      NST Read By     POCT Fetal Nonstress Test    Collection Time: 09/18/18  1:03 PM   Result Value Ref Range    NST Indications polyhydramnios     NST Baseline 140     NST Uterine Activity none     NST Acoustic Stimulation      NST Assessment category one     NST Action Necessary      NST Other Data      NST Return      NST Read By     POC UA    Collection Time: 09/21/18  9:21 AM   Result Value Ref Range    POC Color Yellow     POC Appearance Clear     POC Glucose Negative Negative mg/dL    POC Ketones Negative Negative mg/dL    POC Specific Gravity 1.020 1.005 - 1.030    POC Blood Negative Negative    POC Urine PH 7.0 5.0 - 8.0    POC Protein Negative Negative mg/dL    POC Nitrites Negative Negative    POC Leukocyte Esterase Trace (A) Negative   POCT Fetal Nonstress Test    Collection Time: 09/24/18  2:21 PM   Result Value Ref Range    NST Indications      NST Baseline      NST Uterine Activity      NST Acoustic Stimulation      NST Assessment      NST Action Necessary      NST Other Data      NST Return      NST Read By     GRP B STREP, BY PCR (HURD BROTH)    Collection Time: 09/27/18  1:54 PM   Result Value Ref Range    Strep Gp B DNA PCR Negative Negative   POCT Fetal Nonstress Test    Collection Time: 09/27/18  2:02 PM   Result Value Ref Range    NST Indications Polyhydramnios     NST Baseline 130     NST Uterine Activity Quiet     NST Acoustic Stimulation None     NST Assessment       Reactive NST cat I FHTs +accels -decels mod variability    NST Action Necessary      NST Other Data      NST Return 1wk GERTRUDE, cont 2x/wk NST     NST Read By [facility] [place]    POCT Fetal Nonstress Test    Collection Time: 10/01/18 10:06 AM   Result Value Ref Range    NST Indications      NST Baseline      NST Uterine Activity      NST Acoustic Stimulation      NST Assessment      NST Action Necessary      NST Other Data      NST Return      NST Read By     POCT Fetal Nonstress Test     Collection Time: 10/04/18  1:09 PM   Result Value Ref Range    NST Indications polyhydramnios     NST Baseline 130     NST Uterine Activity none     NST Acoustic Stimulation no     NST Assessment category 1     NST Action Necessary water given     NST Other Data 15 x 15 accels, moderate variability     NST Return 2x/week     NST Read By miranda    POCT Fetal Nonstress Test    Collection Time: 10/08/18 10:18 AM   Result Value Ref Range    NST Indications Polyhydramnios     NST Baseline 130     NST Uterine Activity none     NST Acoustic Stimulation n/a     NST Assessment reactive, cat 1     NST Action Necessary none     NST Other Data cont 2x wkly NST     NST Return as sched     NST Read By [facility]    POC UA    Collection Time: 10/09/18  3:58 PM   Result Value Ref Range    POC Color Brii     POC Appearance Slightly Cloudy (A)     POC Glucose Negative Negative mg/dL    POC Ketones Negative Negative mg/dL    POC Specific Gravity >=1.030 (A) 1.005 - 1.030    POC Blood Negative Negative    POC Urine PH 5.5 5.0 - 8.0    POC Protein Trace (A) Negative mg/dL    POC Nitrites Negative Negative    POC Leukocyte Esterase Small (A) Negative   PROTEIN/CREAT RATIO URINE    Collection Time: 10/09/18  5:00 PM   Result Value Ref Range    Total Protein, Urine 28.5 (H) 0.0 - 15.0 mg/dL    Creatinine, Random Urine 213.80 mg/dL    Protein Creatinine Ratio 133 (H) 10 - 107 mg/g   COMP METABOLIC PANEL    Collection Time: 10/09/18  5:08 PM   Result Value Ref Range    Sodium 133 (L) 135 - 145 mmol/L    Potassium 3.2 (L) 3.6 - 5.5 mmol/L    Chloride 104 96 - 112 mmol/L    Co2 20 20 - 33 mmol/L    Anion Gap 9.0 0.0 - 11.9    Glucose 114 (H) 65 - 99 mg/dL    Bun 8 8 - 22 mg/dL    Creatinine 0.48 (L) 0.50 - 1.40 mg/dL    Calcium 8.6 8.5 - 10.5 mg/dL    AST(SGOT) 14 12 - 45 U/L    ALT(SGPT) 14 2 - 50 U/L    Alkaline Phosphatase 126 (H) 30 - 99 U/L    Total Bilirubin 0.2 0.1 - 1.5 mg/dL    Albumin 3.0 (L) 3.2 - 4.9 g/dL    Total Protein 6.5  6.0 - 8.2 g/dL    Globulin 3.5 1.9 - 3.5 g/dL    A-G Ratio 0.9 g/dL   CBC WITHOUT DIFFERENTIAL    Collection Time: 10/09/18  5:08 PM   Result Value Ref Range    WBC 11.2 (H) 4.8 - 10.8 K/uL    RBC 4.41 4.20 - 5.40 M/uL    Hemoglobin 12.0 12.0 - 16.0 g/dL    Hematocrit 36.6 (L) 37.0 - 47.0 %    MCV 83.0 81.4 - 97.8 fL    MCH 27.2 27.0 - 33.0 pg    MCHC 32.8 (L) 33.6 - 35.0 g/dL    RDW 39.3 35.9 - 50.0 fL    Platelet Count 304 164 - 446 K/uL    MPV 10.6 9.0 - 12.9 fL   URIC ACID    Collection Time: 10/09/18  5:08 PM   Result Value Ref Range    Uric Acid 5.0 1.9 - 8.2 mg/dL   ESTIMATED GFR    Collection Time: 10/09/18  5:08 PM   Result Value Ref Range    GFR If African American >60 >60 mL/min/1.73 m 2    GFR If Non African American >60 >60 mL/min/1.73 m 2        Assessment:   Sharyn Mejias at 37w5d with large for gestational age fetus.   Labor status: Not in labor.  No signs of PEC.   Obstetrical history significant for   Patient Active Problem List    Diagnosis Date Noted   • Polyhydramnios 09/18/2018   • Encounter for supervision of other normal pregnancy, third trimester 03/30/2018   • History of macrosomia in infant - 8lb 14oz, early 1hr GTT elevated, 3hr GTT wnl 03/30/2018   • Depression 05/14/2012   • Migraine    .      Plan:     PEC labs sent and found to be normal with PC ratio of 0.133  Patient given labor precautions.   F/u with TPC at normal appointment in 3 days.  For induction of labor in 1 week.

## 2018-10-11 ENCOUNTER — ROUTINE PRENATAL (OUTPATIENT)
Dept: OBGYN | Facility: CLINIC | Age: 23
End: 2018-10-11

## 2018-10-11 VITALS — DIASTOLIC BLOOD PRESSURE: 69 MMHG | BODY MASS INDEX: 42.93 KG/M2 | SYSTOLIC BLOOD PRESSURE: 119 MMHG | WEIGHT: 266 LBS

## 2018-10-11 DIAGNOSIS — O40.3XX0 POLYHYDRAMNIOS IN THIRD TRIMESTER COMPLICATION, SINGLE OR UNSPECIFIED FETUS: ICD-10-CM

## 2018-10-11 DIAGNOSIS — Z34.83 ENCOUNTER FOR SUPERVISION OF OTHER NORMAL PREGNANCY, THIRD TRIMESTER: Primary | ICD-10-CM

## 2018-10-11 LAB
NST ACOUSTIC STIMULATION: NORMAL
NST ACTION NECESSARY: NORMAL
NST ASSESSMENT: NORMAL
NST BASELINE: 125
NST INDICATIONS: NORMAL
NST OTHER DATA: NORMAL
NST READ BY: NORMAL
NST RETURN: NORMAL
NST UTERINE ACTIVITY: NORMAL

## 2018-10-11 PROCEDURE — 90040 PR PRENATAL FOLLOW UP: CPT | Performed by: NURSE PRACTITIONER

## 2018-10-11 PROCEDURE — 59025 FETAL NON-STRESS TEST: CPT | Performed by: NURSE PRACTITIONER

## 2018-10-11 NOTE — PROGRESS NOTES
S) Pt is a 22 y.o.   at 38w0d  gestation. Routine prenatal care today. Pt denies concerns .    Fetal movement Normal  Cramping no  VB no  LOF no   Denies dysuria. Generally feels well today. Good self-care activities identified. Denies headaches, swelling, visual changes, or epigastric pain .     O) Blood pressure 119/69, weight 120.7 kg (266 lb), last menstrual period 2017, not currently breastfeeding.        Labs: WNL       PNL: WNL       GCT: 3 hr WNL       AFP: Not Examined       GBS: negative       Pertinent ultrasound - 18 survey WNL, TYREL 18.2, consistent with previous dating; 18 TYREL 24.85           A) IUP at 38w0d       S=D         Patient Active Problem List    Diagnosis Date Noted   • Polyhydramnios 2018   • Encounter for supervision of other normal pregnancy, third trimester 2018   • History of macrosomia in infant - 8lb 14oz, early 1hr GTT elevated, 3hr GTT wnl 2018   • Depression 2012   • Migraine           SVE: /-3         TDAP: yes       FLU: no        BTL: yes       : no       C/S Consent: no       IOL or C/S scheduled: yes - req placed for 39 wks.       LAST PAP: 18 negative         P) Labour precautions reviewed.   Fetal movements reviewed.   General ed and anticipatory guidance. Nutrition/exercise/vitamin. Plans breast Plans pp contraception- BTL if c/s    IOL referral placed for 39 wks.  RTC 1 week or PRN

## 2018-10-11 NOTE — PROGRESS NOTES
OB F/U  Denies VB, LOF, or UC  +FM  Phone#: [phone]  Pharmacy Confirmed.  C/O: None  GBS Negative     .

## 2018-10-11 NOTE — PROGRESS NOTES
OB f/u. + fetal movement.  No VB, LOF or UC's.  Good phone # [phone]  Preferred pharmacy confirmed.

## 2018-10-15 DIAGNOSIS — O40.9XX0 POLYHYDRAMNIOS, ANTEPARTUM, SINGLE OR UNSPECIFIED FETUS: ICD-10-CM

## 2018-10-18 ENCOUNTER — ROUTINE PRENATAL (OUTPATIENT)
Dept: OBGYN | Facility: CLINIC | Age: 23
End: 2018-10-18
Payer: MEDICAID

## 2018-10-18 ENCOUNTER — APPOINTMENT (OUTPATIENT)
Dept: OBGYN | Facility: MEDICAL CENTER | Age: 23
End: 2018-10-18
Attending: OBSTETRICS & GYNECOLOGY
Payer: COMMERCIAL

## 2018-10-18 ENCOUNTER — HOSPITAL ENCOUNTER (OUTPATIENT)
Facility: MEDICAL CENTER | Age: 23
End: 2018-10-18
Attending: OBSTETRICS & GYNECOLOGY | Admitting: OBSTETRICS & GYNECOLOGY
Payer: COMMERCIAL

## 2018-10-18 ENCOUNTER — ROUTINE PRENATAL (OUTPATIENT)
Dept: OBGYN | Facility: CLINIC | Age: 23
End: 2018-10-18

## 2018-10-18 VITALS
DIASTOLIC BLOOD PRESSURE: 68 MMHG | HEART RATE: 92 BPM | BODY MASS INDEX: 40.18 KG/M2 | TEMPERATURE: 96.7 F | WEIGHT: 250 LBS | HEIGHT: 66 IN | SYSTOLIC BLOOD PRESSURE: 117 MMHG

## 2018-10-18 VITALS — SYSTOLIC BLOOD PRESSURE: 118 MMHG | BODY MASS INDEX: 43.26 KG/M2 | WEIGHT: 268 LBS

## 2018-10-18 DIAGNOSIS — O36.8130 DECREASED FETAL MOVEMENTS IN THIRD TRIMESTER, SINGLE OR UNSPECIFIED FETUS: ICD-10-CM

## 2018-10-18 DIAGNOSIS — O40.3XX0 POLYHYDRAMNIOS IN THIRD TRIMESTER COMPLICATION, SINGLE OR UNSPECIFIED FETUS: ICD-10-CM

## 2018-10-18 DIAGNOSIS — Z34.83 ENCOUNTER FOR SUPERVISION OF OTHER NORMAL PREGNANCY, THIRD TRIMESTER: ICD-10-CM

## 2018-10-18 PROCEDURE — 700101 HCHG RX REV CODE 250: Performed by: NURSE PRACTITIONER

## 2018-10-18 PROCEDURE — 90040 PR PRENATAL FOLLOW UP: CPT | Performed by: NURSE PRACTITIONER

## 2018-10-18 PROCEDURE — 59025 FETAL NON-STRESS TEST: CPT

## 2018-10-18 RX ADMIN — DINOPROSTONE 5 MG: 20 SUPPOSITORY VAGINAL at 22:11

## 2018-10-18 NOTE — PROGRESS NOTES
Pt here today for OB follow up  Pt states having cx's.   Reports +FM  Good # [phone]  Pharmacy Confirmed.  GEL 10/18 @ 9:00pm  IOL 10/19 @ 9:00am

## 2018-10-18 NOTE — PROGRESS NOTES
SUBJECTIVE:  Pt is a 22 y.o.   at 39w0d  gestation. Presents today for follow-up prenatal care. Reports no issues at this time.Has induction of labor scheduled for this evening.  Reports consistently decreased  fetal movement. Denies cramping/contractions, bleeding or leaking of fluid. Denies dysuria, headaches, N/V, or other issues at this time. Generally feels well today.     OBJECTIVE:  - See prenatal vitals flow  -   Vitals:    10/18/18 1001   BP: (!) 118/0   Weight: 121.6 kg (268 lb)      Labs - normal prenatal labs  US - normal fetal survey            ASSESSMENT:   - IUP at 39w0d    - S=D   -   Patient Active Problem List    Diagnosis Date Noted   • Polyhydramnios 2018   • Encounter for supervision of other normal pregnancy, third trimester 2018   • History of macrosomia in infant - 8lb 14oz, early 1hr GTT elevated, 3hr GTT wnl 2018   • Depression 2012   • Migraine          PLAN:  - S/sx pregnancy and labor warning signs vs general discomforts discussed  - Fetal movements and kick counts reviewed   - Adequate hydration reinforced  - Nutrition/exercise/vitamin education; continued PNV  -  Nst now for decreased fetal movement  Has IOL This evening  Category one strip today.

## 2018-10-19 ENCOUNTER — APPOINTMENT (OUTPATIENT)
Dept: OBGYN | Facility: MEDICAL CENTER | Age: 23
End: 2018-10-19
Attending: OBSTETRICS & GYNECOLOGY
Payer: MEDICAID

## 2018-10-19 ENCOUNTER — HOSPITAL ENCOUNTER (INPATIENT)
Facility: MEDICAL CENTER | Age: 23
LOS: 3 days | End: 2018-10-22
Attending: OBSTETRICS & GYNECOLOGY | Admitting: OBSTETRICS & GYNECOLOGY
Payer: MEDICAID

## 2018-10-19 LAB
BASOPHILS # BLD AUTO: 0.3 % (ref 0–1.8)
BASOPHILS # BLD: 0.03 K/UL (ref 0–0.12)
EOSINOPHIL # BLD AUTO: 0.14 K/UL (ref 0–0.51)
EOSINOPHIL NFR BLD: 1.2 % (ref 0–6.9)
ERYTHROCYTE [DISTWIDTH] IN BLOOD BY AUTOMATED COUNT: 40.4 FL (ref 35.9–50)
HCT VFR BLD AUTO: 37.4 % (ref 37–47)
HGB BLD-MCNC: 12 G/DL (ref 12–16)
HOLDING TUBE BB 8507: NORMAL
IMM GRANULOCYTES # BLD AUTO: 0.04 K/UL (ref 0–0.11)
IMM GRANULOCYTES NFR BLD AUTO: 0.4 % (ref 0–0.9)
LYMPHOCYTES # BLD AUTO: 2.31 K/UL (ref 1–4.8)
LYMPHOCYTES NFR BLD: 20.6 % (ref 22–41)
MCH RBC QN AUTO: 26.5 PG (ref 27–33)
MCHC RBC AUTO-ENTMCNC: 32.1 G/DL (ref 33.6–35)
MCV RBC AUTO: 82.6 FL (ref 81.4–97.8)
MONOCYTES # BLD AUTO: 0.7 K/UL (ref 0–0.85)
MONOCYTES NFR BLD AUTO: 6.2 % (ref 0–13.4)
NEUTROPHILS # BLD AUTO: 7.99 K/UL (ref 2–7.15)
NEUTROPHILS NFR BLD: 71.3 % (ref 44–72)
NRBC # BLD AUTO: 0 K/UL
NRBC BLD-RTO: 0 /100 WBC
PLATELET # BLD AUTO: 303 K/UL (ref 164–446)
PMV BLD AUTO: 10.9 FL (ref 9–12.9)
RBC # BLD AUTO: 4.53 M/UL (ref 4.2–5.4)
WBC # BLD AUTO: 11.2 K/UL (ref 4.8–10.8)

## 2018-10-19 PROCEDURE — 3E033VJ INTRODUCTION OF OTHER HORMONE INTO PERIPHERAL VEIN, PERCUTANEOUS APPROACH: ICD-10-PCS | Performed by: OBSTETRICS & GYNECOLOGY

## 2018-10-19 PROCEDURE — 700105 HCHG RX REV CODE 258

## 2018-10-19 PROCEDURE — 770002 HCHG ROOM/CARE - OB PRIVATE (112)

## 2018-10-19 PROCEDURE — 36415 COLL VENOUS BLD VENIPUNCTURE: CPT

## 2018-10-19 PROCEDURE — 700111 HCHG RX REV CODE 636 W/ 250 OVERRIDE (IP): Performed by: STUDENT IN AN ORGANIZED HEALTH CARE EDUCATION/TRAINING PROGRAM

## 2018-10-19 PROCEDURE — 10907ZC DRAINAGE OF AMNIOTIC FLUID, THERAPEUTIC FROM PRODUCTS OF CONCEPTION, VIA NATURAL OR ARTIFICIAL OPENING: ICD-10-PCS | Performed by: OBSTETRICS & GYNECOLOGY

## 2018-10-19 PROCEDURE — 700101 HCHG RX REV CODE 250

## 2018-10-19 PROCEDURE — 85025 COMPLETE CBC W/AUTO DIFF WBC: CPT

## 2018-10-19 PROCEDURE — 700105 HCHG RX REV CODE 258: Performed by: STUDENT IN AN ORGANIZED HEALTH CARE EDUCATION/TRAINING PROGRAM

## 2018-10-19 RX ORDER — HYDROCODONE BITARTRATE AND ACETAMINOPHEN 5; 325 MG/1; MG/1
2 TABLET ORAL EVERY 4 HOURS PRN
Status: DISCONTINUED | OUTPATIENT
Start: 2018-10-19 | End: 2018-10-22 | Stop reason: HOSPADM

## 2018-10-19 RX ORDER — SODIUM CHLORIDE, SODIUM LACTATE, POTASSIUM CHLORIDE, CALCIUM CHLORIDE 600; 310; 30; 20 MG/100ML; MG/100ML; MG/100ML; MG/100ML
INJECTION, SOLUTION INTRAVENOUS CONTINUOUS
Status: DISPENSED | OUTPATIENT
Start: 2018-10-19 | End: 2018-10-19

## 2018-10-19 RX ORDER — ONDANSETRON 4 MG/1
4 TABLET, ORALLY DISINTEGRATING ORAL EVERY 6 HOURS PRN
Status: DISCONTINUED | OUTPATIENT
Start: 2018-10-19 | End: 2018-10-22 | Stop reason: HOSPADM

## 2018-10-19 RX ORDER — METHYLERGONOVINE MALEATE 0.2 MG/ML
0.2 INJECTION INTRAVENOUS
Status: COMPLETED | OUTPATIENT
Start: 2018-10-19 | End: 2018-10-20

## 2018-10-19 RX ORDER — SODIUM CHLORIDE, SODIUM LACTATE, POTASSIUM CHLORIDE, CALCIUM CHLORIDE 600; 310; 30; 20 MG/100ML; MG/100ML; MG/100ML; MG/100ML
INJECTION, SOLUTION INTRAVENOUS
Status: COMPLETED
Start: 2018-10-19 | End: 2018-10-19

## 2018-10-19 RX ORDER — ACETAMINOPHEN 325 MG/1
650 TABLET ORAL EVERY 4 HOURS PRN
Status: DISCONTINUED | OUTPATIENT
Start: 2018-10-19 | End: 2018-10-22 | Stop reason: HOSPADM

## 2018-10-19 RX ORDER — ONDANSETRON 2 MG/ML
4 INJECTION INTRAMUSCULAR; INTRAVENOUS EVERY 6 HOURS PRN
Status: DISCONTINUED | OUTPATIENT
Start: 2018-10-19 | End: 2018-10-22 | Stop reason: HOSPADM

## 2018-10-19 RX ORDER — OXYTOCIN 10 [USP'U]/ML
10 INJECTION, SOLUTION INTRAMUSCULAR; INTRAVENOUS
Status: DISCONTINUED | OUTPATIENT
Start: 2018-10-19 | End: 2018-10-20 | Stop reason: HOSPADM

## 2018-10-19 RX ORDER — IBUPROFEN 600 MG/1
600 TABLET ORAL EVERY 6 HOURS PRN
Status: DISCONTINUED | OUTPATIENT
Start: 2018-10-19 | End: 2018-10-22 | Stop reason: HOSPADM

## 2018-10-19 RX ADMIN — SODIUM CHLORIDE, POTASSIUM CHLORIDE, SODIUM LACTATE AND CALCIUM CHLORIDE 1000 ML: 600; 310; 30; 20 INJECTION, SOLUTION INTRAVENOUS at 22:20

## 2018-10-19 RX ADMIN — Medication 2 MILLI-UNITS/MIN: at 14:12

## 2018-10-19 RX ADMIN — SODIUM CHLORIDE, POTASSIUM CHLORIDE, SODIUM LACTATE AND CALCIUM CHLORIDE: 600; 310; 30; 20 INJECTION, SOLUTION INTRAVENOUS at 14:05

## 2018-10-19 ASSESSMENT — PATIENT HEALTH QUESTIONNAIRE - PHQ9
SUM OF ALL RESPONSES TO PHQ9 QUESTIONS 1 AND 2: 0
SUM OF ALL RESPONSES TO PHQ9 QUESTIONS 1 AND 2: 0
2. FEELING DOWN, DEPRESSED, IRRITABLE, OR HOPELESS: NOT AT ALL
2. FEELING DOWN, DEPRESSED, IRRITABLE, OR HOPELESS: NOT AT ALL
1. LITTLE INTEREST OR PLEASURE IN DOING THINGS: NOT AT ALL
1. LITTLE INTEREST OR PLEASURE IN DOING THINGS: NOT AT ALL

## 2018-10-19 ASSESSMENT — COPD QUESTIONNAIRES
IN THE PAST 12 MONTHS DO YOU DO LESS THAN YOU USED TO BECAUSE OF YOUR BREATHING PROBLEMS: DISAGREE/UNSURE
DO YOU EVER COUGH UP ANY MUCUS OR PHLEGM?: NO/ONLY WITH OCCASIONAL COLDS OR INFECTIONS
HAVE YOU SMOKED AT LEAST 100 CIGARETTES IN YOUR ENTIRE LIFE: NO/DON'T KNOW
COPD SCREENING SCORE: 0
DURING THE PAST 4 WEEKS HOW MUCH DID YOU FEEL SHORT OF BREATH: NONE/LITTLE OF THE TIME

## 2018-10-19 ASSESSMENT — LIFESTYLE VARIABLES
ALCOHOL_USE: NO
EVER_SMOKED: YES

## 2018-10-19 NOTE — PROGRESS NOTES
, EDC 10/25, GA 39w. Pt presents to L&D for scheduled OP gel to initiate IOL for polyhydraminos. Pt denies LOF, VB, or UCs and reports + Fetal movement. Pt escorted to triage room, oriented to room and unit, and external monitors applied. POC discussed with pt and mother and encouraged to state needs or questions at any time.     SVE by YANETH Damian RN /2, firm and posterior.     MATHEUS Mccray CNM called and given report. Will place gel per protocol.     Prostaglandin gel placed by YANETH Damian RN (see MAR).     MATHEUS Mccray CNM called and given update. D/C order received for 1hr after placing gel.    2314 D/C instructions reviewed with pt who states understanding. Pt d/c to self with family.

## 2018-10-19 NOTE — PROGRESS NOTES
1200 Pt arrived to L&D for scheduled IOL for Polyhydramnios.  Pt taken to S219, EFM and TOCO applied, POC discussed, orders received, admission profile completed, IV started, labs drawn and sent, VSS. Pt encouraged to call RN for any needs, wants, desires or concerns. SVE as noted.  1245 Dr. Gomez at bedside to see patient, POC discussed, pt verbalized understanding.   1400 Dr. Flynn at beside, POC discussed, will start pitocin and AROM once contractions are regular. Will continue with POC.  1412 pitocin started, see MAR. Pt education provided.   1440 bedside report given to JASE Pires RN, assumed care, POC dicussed, all questions answered.

## 2018-10-19 NOTE — PROGRESS NOTES
Labor Check    S: Pt examined. Feeling occ ctx    O:  Gen: AAO in NAD  Abd: gravid, nontender to palpation  SVE: 3 / thick / -3 arom clear fluids  Ext: nontender bl, nonedematous    FHT: 140 / mod jonathan / reactive  [place]: irregularly    A/P:  22 y.o.  at 39w1d IOL for poly  - epidural - declines  - GBS neg  - pitocin at 2mu  - arom clear fluids

## 2018-10-19 NOTE — H&P
History and Physical      Sharyn Mejias is a 22 y.o. year old female  at 39w1d dated by LMP cw/ 5w US who presents for IOL for induction of labor with a gel placed last night.     PNC with the TPC starting at 12w    Subjective:   negative For CTXS.   negative Feels pain   negative for LOF  negative for vaginal bleeding.   positive for fetal movement    ROS: Patient denies fever, chills, nausea, vomiting , headache, visual disturbance, or dysuria.    Past Medical History:   Diagnosis Date   • Depression     postpartum depression   • Ovarian cyst 13     No past surgical history on file.  OB History    Para Term  AB Living   4 3 3     3   SAB TAB Ectopic Molar Multiple Live Births             3      # Outcome Date GA Lbr Chad/2nd Weight Sex Delivery Anes PTL Lv   4 Current            3 Term 14 39w0d  3.856 kg (8 lb 8 oz) F Vag-Spont   SANCHEZ   2 Term 12 41w2d  3.544 kg (7 lb 13 oz) M Vag-Spont EPI  SANCHEZ      Birth Comments: baby was not breathing when born    1 Term 07/01/10 40w6d 04:00 4.026 kg (8 lb 14 oz) F Vag-Spont None N SANCHEZ      Birth Comments: no complications        Social History     Social History   • Marital status:      Spouse name: N/A   • Number of children: N/A   • Years of education: N/A     Occupational History   • Not on file.     Social History Main Topics   • Smoking status: Never Smoker   • Smokeless tobacco: Never Used   • Alcohol use No   • Drug use: No   • Sexual activity: Yes     Partners: Male     Birth control/ protection: Surgical      Comment: desires postpartum btl.      Other Topics Concern   • Not on file     Social History Narrative    ** Merged History Encounter **          Allergies: Nkda [no known drug allergy]  No current facility-administered medications on file prior to encounter.      Current Outpatient Prescriptions on File Prior to Encounter   Medication Sig Dispense Refill   • meloxicam (MOBIC) 7.5 MG Tab Take 1  "Tab by mouth every day. 30 Tab 0   • metoclopramide (REGLAN) 10 MG Tab Take 1 Tab by mouth 4 times a day as needed (nausea). 20 Tab 0   • diphenhydrAMINE (BENADRYL) 25 MG capsule Take 1 Cap by mouth every 6 hours as needed (nausea / headache). 20 Cap 0   • Prenatal MV-Min-Fe Fum-FA-DHA (PRENATAL 1 PO) Take  by mouth.           Objective:      Blood pressure 119/70, pulse (!) 101, temperature 36.4 °C (97.6 °F), temperature source Temporal, resp. rate 18, height 1.676 m (5' 6\"), weight 121.6 kg (268 lb), last menstrual period 2017, not currently breastfeeding.    General: Afebrile, NAD  Lungs: CTABL  Heart: RRR, no murmurs, rubs, or gallops  Abdomen: gravid, nontender,   Skin: No rash  Extremities: no peripheral edema, pulses 2+ bilat peripherally  EFW: 3700g  FHRT: category I  Presentation: vertex  Cervix 3/50/-2 soft      Lab Review  Lab:   Blood type: A    Hgb: non-reactive   HIV: non-reactive   GC: negative   Chlamydia: negative    Rubella: immune    GBS: negative  1 hr GTT: elevated but 3hr WNL  Treponema positive consistent with prior infection for which patient received treatment years ago    Recent Labs      18   1107  18   2037  18   1305  18   1435   ABOGROUP  A   --    --    --    RUBELLAIGG  44.30   --    --    --    RPR  Non Reactive   --   Non Reactive   --    URINECULT   --   Mixed skin blayne 10-50,000 cfu/mL   --    --    HEPBSAG  Negative   --    --    --    HEPCAB   --    --    --   Negative       No results for input(s): WBC, RBC, HEMOGLOBIN, HEMATOCRIT, MCV, MCH, RDW, PLATELETCT, MPV, NEUTSPOLYS, LYMPHOCYTES, MONOCYTES, EOSINOPHILS, BASOPHILS, RBCMORPHOLO in the last 72 hours.  No results for input(s): SODIUM, POTASSIUM, CHLORIDE, CO2, GLUCOSE, BUN, CPKTOTAL in the last 72 hours.        Assessment/Plan:   Sharyn Sloan PegueroKathy is a 22 y.o. year old female  at 39w1d dated by LMP cw/ 5w US who presents for IOL for polyhydramnios with gel placement last " night.      - Admit to L&D  - Anticipate   - IOL pitocin  - GBS negative, PNL wnl

## 2018-10-20 LAB
ERYTHROCYTE [DISTWIDTH] IN BLOOD BY AUTOMATED COUNT: 41.1 FL (ref 35.9–50)
HCT VFR BLD AUTO: 33 % (ref 37–47)
HGB BLD-MCNC: 10.9 G/DL (ref 12–16)
MCH RBC QN AUTO: 27.6 PG (ref 27–33)
MCHC RBC AUTO-ENTMCNC: 33 G/DL (ref 33.6–35)
MCV RBC AUTO: 83.5 FL (ref 81.4–97.8)
PLATELET # BLD AUTO: 257 K/UL (ref 164–446)
PMV BLD AUTO: 10.8 FL (ref 9–12.9)
RBC # BLD AUTO: 3.95 M/UL (ref 4.2–5.4)
WBC # BLD AUTO: 15.3 K/UL (ref 4.8–10.8)

## 2018-10-20 PROCEDURE — 4A1H7CZ MONITORING OF PRODUCTS OF CONCEPTION, CARDIAC RATE, VIA NATURAL OR ARTIFICIAL OPENING: ICD-10-PCS | Performed by: OBSTETRICS & GYNECOLOGY

## 2018-10-20 PROCEDURE — 303615 HCHG EPIDURAL/SPINAL ANESTHESIA FOR LABOR

## 2018-10-20 PROCEDURE — 700105 HCHG RX REV CODE 258: Performed by: NURSE PRACTITIONER

## 2018-10-20 PROCEDURE — 700105 HCHG RX REV CODE 258

## 2018-10-20 PROCEDURE — 700102 HCHG RX REV CODE 250 W/ 637 OVERRIDE(OP): Performed by: STUDENT IN AN ORGANIZED HEALTH CARE EDUCATION/TRAINING PROGRAM

## 2018-10-20 PROCEDURE — 59409 OBSTETRICAL CARE: CPT

## 2018-10-20 PROCEDURE — 59409 OBSTETRICAL CARE: CPT | Performed by: NURSE PRACTITIONER

## 2018-10-20 PROCEDURE — 700111 HCHG RX REV CODE 636 W/ 250 OVERRIDE (IP)

## 2018-10-20 PROCEDURE — A9270 NON-COVERED ITEM OR SERVICE: HCPCS | Performed by: STUDENT IN AN ORGANIZED HEALTH CARE EDUCATION/TRAINING PROGRAM

## 2018-10-20 PROCEDURE — 10H07YZ INSERTION OF OTHER DEVICE INTO PRODUCTS OF CONCEPTION, VIA NATURAL OR ARTIFICIAL OPENING: ICD-10-PCS | Performed by: OBSTETRICS & GYNECOLOGY

## 2018-10-20 PROCEDURE — 36415 COLL VENOUS BLD VENIPUNCTURE: CPT

## 2018-10-20 PROCEDURE — 700111 HCHG RX REV CODE 636 W/ 250 OVERRIDE (IP): Performed by: STUDENT IN AN ORGANIZED HEALTH CARE EDUCATION/TRAINING PROGRAM

## 2018-10-20 PROCEDURE — 304965 HCHG RECOVERY SERVICES

## 2018-10-20 PROCEDURE — 700112 HCHG RX REV CODE 229: Performed by: STUDENT IN AN ORGANIZED HEALTH CARE EDUCATION/TRAINING PROGRAM

## 2018-10-20 PROCEDURE — 770002 HCHG ROOM/CARE - OB PRIVATE (112)

## 2018-10-20 PROCEDURE — 85027 COMPLETE CBC AUTOMATED: CPT

## 2018-10-20 RX ORDER — ROPIVACAINE HYDROCHLORIDE 2 MG/ML
INJECTION, SOLUTION EPIDURAL; INFILTRATION; PERINEURAL
Status: COMPLETED
Start: 2018-10-20 | End: 2018-10-20

## 2018-10-20 RX ORDER — SODIUM CHLORIDE, SODIUM LACTATE, POTASSIUM CHLORIDE, CALCIUM CHLORIDE 600; 310; 30; 20 MG/100ML; MG/100ML; MG/100ML; MG/100ML
INJECTION, SOLUTION INTRAVENOUS PRN
Status: DISCONTINUED | OUTPATIENT
Start: 2018-10-20 | End: 2018-10-22 | Stop reason: HOSPADM

## 2018-10-20 RX ORDER — ROPIVACAINE HYDROCHLORIDE 2 MG/ML
INJECTION, SOLUTION EPIDURAL; INFILTRATION; PERINEURAL CONTINUOUS
Status: DISCONTINUED | OUTPATIENT
Start: 2018-10-20 | End: 2018-10-21

## 2018-10-20 RX ORDER — DOCUSATE SODIUM 100 MG/1
100 CAPSULE, LIQUID FILLED ORAL 2 TIMES DAILY PRN
Status: DISCONTINUED | OUTPATIENT
Start: 2018-10-20 | End: 2018-10-22 | Stop reason: HOSPADM

## 2018-10-20 RX ORDER — DEXTROSE, SODIUM CHLORIDE, SODIUM LACTATE, POTASSIUM CHLORIDE, AND CALCIUM CHLORIDE 5; .6; .31; .03; .02 G/100ML; G/100ML; G/100ML; G/100ML; G/100ML
INJECTION, SOLUTION INTRAVENOUS CONTINUOUS
Status: DISCONTINUED | OUTPATIENT
Start: 2018-10-20 | End: 2018-10-22 | Stop reason: HOSPADM

## 2018-10-20 RX ORDER — SODIUM CHLORIDE, SODIUM LACTATE, POTASSIUM CHLORIDE, AND CALCIUM CHLORIDE .6; .31; .03; .02 G/100ML; G/100ML; G/100ML; G/100ML
1000 INJECTION, SOLUTION INTRAVENOUS
Status: DISCONTINUED | OUTPATIENT
Start: 2018-10-20 | End: 2018-10-20 | Stop reason: HOSPADM

## 2018-10-20 RX ORDER — SODIUM CHLORIDE, SODIUM LACTATE, POTASSIUM CHLORIDE, CALCIUM CHLORIDE 600; 310; 30; 20 MG/100ML; MG/100ML; MG/100ML; MG/100ML
INJECTION, SOLUTION INTRAVENOUS
Status: COMPLETED
Start: 2018-10-20 | End: 2018-10-20

## 2018-10-20 RX ORDER — BISACODYL 10 MG
10 SUPPOSITORY, RECTAL RECTAL PRN
Status: DISCONTINUED | OUTPATIENT
Start: 2018-10-20 | End: 2018-10-22 | Stop reason: HOSPADM

## 2018-10-20 RX ORDER — MISOPROSTOL 200 UG/1
TABLET ORAL
Status: COMPLETED
Start: 2018-10-20 | End: 2018-10-20

## 2018-10-20 RX ORDER — SODIUM CHLORIDE, SODIUM LACTATE, POTASSIUM CHLORIDE, AND CALCIUM CHLORIDE .6; .31; .03; .02 G/100ML; G/100ML; G/100ML; G/100ML
250 INJECTION, SOLUTION INTRAVENOUS PRN
Status: DISCONTINUED | OUTPATIENT
Start: 2018-10-20 | End: 2018-10-20 | Stop reason: HOSPADM

## 2018-10-20 RX ADMIN — ROPIVACAINE HYDROCHLORIDE 100 ML: 2 INJECTION, SOLUTION EPIDURAL; INFILTRATION; PERINEURAL at 08:48

## 2018-10-20 RX ADMIN — IBUPROFEN 600 MG: 600 TABLET, FILM COATED ORAL at 23:40

## 2018-10-20 RX ADMIN — IBUPROFEN 600 MG: 600 TABLET, FILM COATED ORAL at 17:28

## 2018-10-20 RX ADMIN — Medication 10 MILLI-UNITS/MIN: at 04:33

## 2018-10-20 RX ADMIN — Medication 125 ML/HR: at 15:00

## 2018-10-20 RX ADMIN — SODIUM CHLORIDE, POTASSIUM CHLORIDE, SODIUM LACTATE AND CALCIUM CHLORIDE 1000 ML: 600; 310; 30; 20 INJECTION, SOLUTION INTRAVENOUS at 08:30

## 2018-10-20 RX ADMIN — Medication 999 MILLI-UNITS/MIN: at 13:58

## 2018-10-20 RX ADMIN — SODIUM CHLORIDE, POTASSIUM CHLORIDE, SODIUM LACTATE AND CALCIUM CHLORIDE 1000 ML: 600; 310; 30; 20 INJECTION, SOLUTION INTRAVENOUS at 07:45

## 2018-10-20 RX ADMIN — DOCUSATE SODIUM 100 MG: 100 CAPSULE, LIQUID FILLED ORAL at 23:40

## 2018-10-20 RX ADMIN — HYDROCODONE BITARTRATE AND ACETAMINOPHEN 1 TABLET: 5; 325 TABLET ORAL at 18:58

## 2018-10-20 RX ADMIN — SODIUM CHLORIDE, SODIUM LACTATE, POTASSIUM CHLORIDE, CALCIUM CHLORIDE AND DEXTROSE MONOHYDRATE: 5; 600; 310; 30; 20 INJECTION, SOLUTION INTRAVENOUS at 02:05

## 2018-10-20 RX ADMIN — SODIUM CHLORIDE, SODIUM LACTATE, POTASSIUM CHLORIDE, CALCIUM CHLORIDE AND DEXTROSE MONOHYDRATE: 5; 600; 310; 30; 20 INJECTION, SOLUTION INTRAVENOUS at 10:02

## 2018-10-20 ASSESSMENT — PAIN SCALES - GENERAL
PAINLEVEL_OUTOF10: 5
PAINLEVEL_OUTOF10: 4
PAINLEVEL_OUTOF10: 6
PAINLEVEL_OUTOF10: 3
PAINLEVEL_OUTOF10: 6

## 2018-10-20 NOTE — L&D DELIVERY NOTE
Vaginal Delivery Procedure Note:    Sharyn Sloan PegueroLarryJesi is a 22 y.o. , now Para 4     Weeks of gestation: 39w2d  Diagnosis:      of viable female infant over intact perineum.  APGARs 8 and 9  Birth weight pending  Nuchal cord none  Placenta delivered spontaneously intact with 3 Vessel cord.  Laceration: audrey  Excellent hemostasis.    Anesthesia - epidural  Patient tolerated procedure well.

## 2018-10-20 NOTE — PROGRESS NOTES
MD L&D progress note     S: comfy with epidural    O: VSS afebrile  FHR - 120, pos accels, neg decels, mod variability, cat 1 FHR  [place] - every 5 minutes  SVE - 6/70/-2  Pitocin at 20mU     A: IUP at 39w2d IOL for polyhydramnios, s/p gel x 1, on pitocin per protocol. Cat 1 tracing     P: Continue labor management, fetal monitoring/toco, IV fluids  OK to increase to pitocin 30mU

## 2018-10-20 NOTE — PROGRESS NOTES
PATIENT ID:  NAME:  Sharyn Mejias  MRN:               [id number]  YOB: 1995    Subjective:   Patient reports feeling more uncomfortable with contractions.     Objective:    Vitals:    10/19/18 1550 10/19/18 1800 10/19/18 2025 10/19/18 2219   BP: 125/78 133/78 (!) 97/54 122/80   Pulse: 89 100 83 83   Resp:       Temp: 36.7 °C (98 °F) 36.7 °C (98 °F) 36.7 °C (98 °F)    TempSrc: Temporal Temporal Temporal    Weight:       Height:           Cervix:  5-6cm/70%/-2  [place]: Uterine Contractions Q 3-5  minutes.   FHRM: Baseline 135, moderate variability, Accels present, no decels   Pitocin: 20      Assessment:   22 y.o. female  at 39w2d.       Plan:   1. LR for IV hydration  2. IUPC and FSE in place as difficult monitoring due to maternal panus  3. Continuous fetal heart rate and maternal monitoring  4. Encouraged position changes and up and out of bed   5. Anticipate

## 2018-10-20 NOTE — CARE PLAN
Problem: Pain  Goal: Alleviation of Pain or a reduction in pain to the patient's comfort goal  Outcome: PROGRESSING AS EXPECTED  Decided to get epidural because not making cervical change; coping well during UCs    Problem: Risk for Infection, Impaired Wound Healing  Goal: Remain free from signs and symptoms of infection  Outcome: PROGRESSING AS EXPECTED  Afebrile; monitoring closely for chorio; limiting vaginal exams

## 2018-10-20 NOTE — PROGRESS NOTES
0700: Report received from Niharika RN. Pt is tolerating UCs well; pitocin increased  0725: Dr. Mock at bedside; SVE with no cervical change. Discussed option of getting epidural and reposition changes  0730: Pt consented to epidural; LR bolus started  0800: Awaiting anesthesiologist. Assisted to hands and knees position; educated about its importance  0830: Dr. Avila notified of uterine activity; no new orders  0844: Dr. lackey at bedside to place epidural; took two attempts but successful. Negative test dose  0950: Received orders from Dr. Avila to increase pitocin past 20 selin up to 30  1300: Pt starting to feel pressure with UCs; comfortable still  1325: Feels more pressure; SVE C/0. MD notified; orders to hold off of pushing until she is available  1330: Pt needs to push; SVE C/+1. MD notified; will be at bedside soon  1335: MD at bedside; remains at bedside to  with pushing.  1350:  of viable female; 8/9 APGAR. Baby skin to skin with mother. Bleeding light; fundus firm  1545: Assisted to bathroom to void with assistance of SHARI Mendenhall RN; transferred to post partum

## 2018-10-20 NOTE — PROGRESS NOTES
1500 report from YANETH Gandhi RN  1547 AROM Clear 3/thick/-3 per Dr Flynn  1800 SVE 4/50/-2  1900 report to MAYELIN Alonzo RN

## 2018-10-20 NOTE — PROGRESS NOTES
1900 Report from JASE Pires RN. POC discussed.   1910 MAYELIN Cortez CNM at bedside to speak with pt. Encouraged movement and use of birthing ball at this time.   2003 FITO Cortez at bedside for IUPC/FSE placement.   0000 FITO Cortez at bedside for SVE. 5-6/70/-2. Continue current POC.   0415 SVE 6/70/-2. FITO Cortez CNM updated.   0430 Dr. Mock updated, new orders to change out bag of pitocin for a new bag and half the rate then continue increasing from there. See MAR.   0700 Report to FABIAN Gore RN. POC discussed.

## 2018-10-20 NOTE — PROGRESS NOTES
Patient arrived on unit at 1655 with infant and L&D RN in wheelchair. Assessment completed. Fundus firm; lochia light rubra. Patient states pain 3/10; medicated per MAR. Patient oriented to room; verbalizes understanding. Plan of care discussed. All questions and concerns addressed. Call light demonstrated. Bed in lowest, locked position. Will continue to monitor.

## 2018-10-21 PROCEDURE — 700102 HCHG RX REV CODE 250 W/ 637 OVERRIDE(OP): Performed by: STUDENT IN AN ORGANIZED HEALTH CARE EDUCATION/TRAINING PROGRAM

## 2018-10-21 PROCEDURE — A9270 NON-COVERED ITEM OR SERVICE: HCPCS | Performed by: STUDENT IN AN ORGANIZED HEALTH CARE EDUCATION/TRAINING PROGRAM

## 2018-10-21 PROCEDURE — 770002 HCHG ROOM/CARE - OB PRIVATE (112)

## 2018-10-21 PROCEDURE — 700112 HCHG RX REV CODE 229: Performed by: STUDENT IN AN ORGANIZED HEALTH CARE EDUCATION/TRAINING PROGRAM

## 2018-10-21 RX ORDER — IBUPROFEN 600 MG/1
600 TABLET ORAL EVERY 6 HOURS PRN
Status: DISCONTINUED | OUTPATIENT
Start: 2018-10-21 | End: 2018-10-21

## 2018-10-21 RX ORDER — SODIUM CHLORIDE, SODIUM LACTATE, POTASSIUM CHLORIDE, CALCIUM CHLORIDE 600; 310; 30; 20 MG/100ML; MG/100ML; MG/100ML; MG/100ML
INJECTION, SOLUTION INTRAVENOUS PRN
Status: DISCONTINUED | OUTPATIENT
Start: 2018-10-21 | End: 2018-10-21

## 2018-10-21 RX ORDER — VITAMIN A ACETATE, BETA CAROTENE, ASCORBIC ACID, CHOLECALCIFEROL, .ALPHA.-TOCOPHEROL ACETATE, DL-, THIAMINE MONONITRATE, RIBOFLAVIN, NIACINAMIDE, PYRIDOXINE HYDROCHLORIDE, FOLIC ACID, CYANOCOBALAMIN, CALCIUM CARBONATE, FERROUS FUMARATE, ZINC OXIDE, CUPRIC OXIDE 3080; 12; 120; 400; 1; 1.84; 3; 20; 22; 920; 25; 200; 27; 10; 2 [IU]/1; UG/1; MG/1; [IU]/1; MG/1; MG/1; MG/1; MG/1; MG/1; [IU]/1; MG/1; MG/1; MG/1; MG/1; MG/1
1 TABLET, FILM COATED ORAL EVERY MORNING
Status: DISCONTINUED | OUTPATIENT
Start: 2018-10-22 | End: 2018-10-21

## 2018-10-21 RX ORDER — HYDROCODONE BITARTRATE AND ACETAMINOPHEN 5; 325 MG/1; MG/1
2 TABLET ORAL EVERY 4 HOURS PRN
Status: DISCONTINUED | OUTPATIENT
Start: 2018-10-21 | End: 2018-10-21

## 2018-10-21 RX ORDER — ONDANSETRON 2 MG/ML
4 INJECTION INTRAMUSCULAR; INTRAVENOUS EVERY 6 HOURS PRN
Status: DISCONTINUED | OUTPATIENT
Start: 2018-10-21 | End: 2018-10-21

## 2018-10-21 RX ORDER — ROPIVACAINE HYDROCHLORIDE 2 MG/ML
INJECTION, SOLUTION EPIDURAL; INFILTRATION; PERINEURAL CONTINUOUS
Status: DISCONTINUED | OUTPATIENT
Start: 2018-10-21 | End: 2018-10-21

## 2018-10-21 RX ORDER — DOCUSATE SODIUM 100 MG/1
100 CAPSULE, LIQUID FILLED ORAL 2 TIMES DAILY PRN
Status: DISCONTINUED | OUTPATIENT
Start: 2018-10-21 | End: 2018-10-21

## 2018-10-21 RX ORDER — ONDANSETRON 4 MG/1
4 TABLET, ORALLY DISINTEGRATING ORAL EVERY 6 HOURS PRN
Status: DISCONTINUED | OUTPATIENT
Start: 2018-10-21 | End: 2018-10-21

## 2018-10-21 RX ORDER — SIMETHICONE 80 MG
80 TABLET,CHEWABLE ORAL 4 TIMES DAILY PRN
Status: DISCONTINUED | OUTPATIENT
Start: 2018-10-21 | End: 2018-10-21

## 2018-10-21 RX ORDER — HYDROCODONE BITARTRATE AND ACETAMINOPHEN 5; 325 MG/1; MG/1
1 TABLET ORAL EVERY 4 HOURS PRN
Status: DISCONTINUED | OUTPATIENT
Start: 2018-10-21 | End: 2018-10-21

## 2018-10-21 RX ORDER — SODIUM CHLORIDE, SODIUM LACTATE, POTASSIUM CHLORIDE, AND CALCIUM CHLORIDE .6; .31; .03; .02 G/100ML; G/100ML; G/100ML; G/100ML
250 INJECTION, SOLUTION INTRAVENOUS PRN
Status: DISCONTINUED | OUTPATIENT
Start: 2018-10-21 | End: 2018-10-21

## 2018-10-21 RX ORDER — SODIUM CHLORIDE, SODIUM LACTATE, POTASSIUM CHLORIDE, AND CALCIUM CHLORIDE .6; .31; .03; .02 G/100ML; G/100ML; G/100ML; G/100ML
1000 INJECTION, SOLUTION INTRAVENOUS
Status: DISCONTINUED | OUTPATIENT
Start: 2018-10-21 | End: 2018-10-21

## 2018-10-21 RX ADMIN — IBUPROFEN 600 MG: 600 TABLET, FILM COATED ORAL at 13:45

## 2018-10-21 RX ADMIN — DOCUSATE SODIUM 100 MG: 100 CAPSULE, LIQUID FILLED ORAL at 07:47

## 2018-10-21 RX ADMIN — IBUPROFEN 600 MG: 600 TABLET, FILM COATED ORAL at 20:00

## 2018-10-21 RX ADMIN — IBUPROFEN 600 MG: 600 TABLET, FILM COATED ORAL at 07:47

## 2018-10-21 ASSESSMENT — PAIN SCALES - GENERAL
PAINLEVEL_OUTOF10: 6
PAINLEVEL_OUTOF10: 2
PAINLEVEL_OUTOF10: 3
PAINLEVEL_OUTOF10: 2
PAINLEVEL_OUTOF10: 4
PAINLEVEL_OUTOF10: 4
PAINLEVEL_OUTOF10: 8
PAINLEVEL_OUTOF10: 3

## 2018-10-21 ASSESSMENT — PATIENT HEALTH QUESTIONNAIRE - PHQ9
1. LITTLE INTEREST OR PLEASURE IN DOING THINGS: NOT AT ALL
SUM OF ALL RESPONSES TO PHQ9 QUESTIONS 1 AND 2: 0
2. FEELING DOWN, DEPRESSED, IRRITABLE, OR HOPELESS: NOT AT ALL

## 2018-10-21 ASSESSMENT — EDINBURGH POSTNATAL DEPRESSION SCALE (EPDS)
I HAVE FELT SAD OR MISERABLE: NO, NOT AT ALL
I HAVE FELT SCARED OR PANICKY FOR NO GOOD REASON: NO, NOT MUCH
THE THOUGHT OF HARMING MYSELF HAS OCCURRED TO ME: NEVER
THINGS HAVE BEEN GETTING ON TOP OF ME: NO, MOST OF THE TIME I HAVE COPED QUITE WELL
I HAVE BEEN SO UNHAPPY THAT I HAVE BEEN CRYING: NO, NEVER
I HAVE BEEN SO UNHAPPY THAT I HAVE HAD DIFFICULTY SLEEPING: NOT AT ALL
I HAVE BEEN ABLE TO LAUGH AND SEE THE FUNNY SIDE OF THINGS: AS MUCH AS I ALWAYS COULD
I HAVE LOOKED FORWARD WITH ENJOYMENT TO THINGS: RATHER LESS THAN I USED TO
I HAVE BLAMED MYSELF UNNECESSARILY WHEN THINGS WENT WRONG: NOT VERY OFTEN
I HAVE BEEN ANXIOUS OR WORRIED FOR NO GOOD REASON: HARDLY EVER

## 2018-10-21 NOTE — PROGRESS NOTES
Obstetrics & Gynecology Post-Delivery Progress Note    Date of Service  10/21/2018    22 y.o.  1 s/p Vaginal, Spontaneous Delivery   Delivery date: 10/20/2018  Breastfeeding: Yes    Events  No events    Subjective  Pain: Yes,  location afterpains, and controlled  Bleeding: lochia minimal  PO's: taking regular diet  Voiding: without difficulty  Ambulating: yes  Feeding: breastfeeding well    Objective  Temp:  [36.2 °C (97.2 °F)-37.4 °C (99.4 °F)] 36.6 °C (97.9 °F)  Pulse:  [] 62  Resp:  [16-18] 18  BP: ()/(45-72) 91/56    Physical Exam  General: well and resting  Chest/Breasts: nipples intact and breasts soft  Fundus: firm, below umbilicus and nontender  Incision: not applicable, (vaginal delivery)  Perineum: intact perineum  Extremities: 1+ edema    Lab Results   Component Value Date    RBC 3.95 (L) 10/20/2018    ABOGROUP A 2018    RH POS 2018     Immunization History   Administered Date(s) Administered   • Influenza TIV (IM) 2012   • Tdap Vaccine 2010, 2018       Assessment/Plan  Sharyn Mejias is a 22 y.o.  postpartum day 1 s/p Vaginal, Spontaneous Delivery .    1. Post care: meeting all goals  2. Hemodynamics: stable  3. Pain: controlled  4. PNL:   Lab Results   Component Value Date    RH POS 2018    RUBELLAIGG 44.30 2018     5. Method of Feeding:  Breastfeeding without difficulty, nipple assessment done  6. Method of Contraception: NA  7. Vaccinations:   Immunization History   Administered Date(s) Administered   • Influenza TIV (IM) 2012   • Tdap Vaccine 2010, 2018     8. Disposition: likely home postpartum day 2    No new Assessment & Plan notes have been filed under this hospital service since the last note was generated.  Service: Obstetrics & Gynecology       VTE prophylaxis: ambulating    Daina Vargas CNM, A.P.R.N.

## 2018-10-21 NOTE — CARE PLAN
Problem: Potential for postpartum infection related to presence of episiotomy/vaginal tear and/or uterine contamination  Goal: Patient will be absent from signs and symptoms of infection  Outcome: PROGRESSING AS EXPECTED  Patient afebrile; vital signs per unit protocol.     Problem: Potential knowledge deficit related to lack of understanding of self and  care  Goal: Patient will verbalize understanding of self and infant care  Outcome: PROGRESSING AS EXPECTED  Education reviewed with patient; verbalizes understanding.

## 2018-10-21 NOTE — PROGRESS NOTES
Report received at 0700. Assessment completed: VSS. Patient ambulates by self. Fundus firm, lochia scant rubra. Patient states pain 8/10; medicated per MAR. Patient would like medication offered when available. Bed in lowest locked position. Call light in place. All questions and concerns addressed. Will continue to monitor.

## 2018-10-21 NOTE — PROGRESS NOTES
Pt assessment complete, wnl.  Pt ambulating and voiding without difficulty.  Bonding well with infant, breast and bottle feeding independently.  Pt states she wants to do both.  Plan of care discussed with patient for the night.  Questions answered.  Family at bedside and supportive.  Encouraged to call with needs, call light in reach.

## 2018-10-21 NOTE — LACTATION NOTE
Lactation visit done. MOB reports baby is breastfeeding well, and no nipple pain or damage. She reports she only nursed her other 3 children for a short period of time as they had latching trouble. Discussed feeding frequency norms and how the breasts make milk. Encouraged to feed baby with every hunger cue/. Reviewed her post d/c bfdg resources and pacifier use.

## 2018-10-21 NOTE — CARE PLAN
Problem: Altered physiologic condition related to immediate post-delivery state and potential for bleeding/hemorrhage  Goal: Patient physiologically stable as evidenced by normal lochia, palpable uterine involution and vital signs within normal limits  Outcome: PROGRESSING AS EXPECTED  Fundus firm, lochia light, VSS.    Problem: Alteration in comfort related to episiotomy, vaginal repair and/or after birth pains  Goal: Patient verbalizes acceptable pain level  Outcome: PROGRESSING AS EXPECTED  Pt verbalizes acceptable pain level,taking po pain medication as needed.

## 2018-10-21 NOTE — CARE PLAN
Problem: Altered physiologic condition related to immediate post-delivery state and potential for bleeding/hemorrhage  Goal: Patient physiologically stable as evidenced by normal lochia, palpable uterine involution and vital signs within normal limits  Outcome: PROGRESSING AS EXPECTED  Fundal massage per protocol. Fundus firm; lochia light rubra.     Problem: Alteration in comfort related to episiotomy, vaginal repair and/or after birth pains  Goal: Patient is able to ambulate, care for self and infant  Outcome: PROGRESSING AS EXPECTED  Patient states pain 3/10 upon arrival on unit. Medicated per MAR. Plan of care discussed on pain management plan.

## 2018-10-22 VITALS
HEIGHT: 66 IN | RESPIRATION RATE: 20 BRPM | OXYGEN SATURATION: 97 % | HEART RATE: 81 BPM | SYSTOLIC BLOOD PRESSURE: 101 MMHG | TEMPERATURE: 97.5 F | WEIGHT: 268 LBS | BODY MASS INDEX: 43.07 KG/M2 | DIASTOLIC BLOOD PRESSURE: 70 MMHG

## 2018-10-22 PROCEDURE — A9270 NON-COVERED ITEM OR SERVICE: HCPCS | Performed by: OBSTETRICS & GYNECOLOGY

## 2018-10-22 PROCEDURE — 700112 HCHG RX REV CODE 229: Performed by: STUDENT IN AN ORGANIZED HEALTH CARE EDUCATION/TRAINING PROGRAM

## 2018-10-22 PROCEDURE — A9270 NON-COVERED ITEM OR SERVICE: HCPCS | Performed by: STUDENT IN AN ORGANIZED HEALTH CARE EDUCATION/TRAINING PROGRAM

## 2018-10-22 PROCEDURE — 700102 HCHG RX REV CODE 250 W/ 637 OVERRIDE(OP): Performed by: OBSTETRICS & GYNECOLOGY

## 2018-10-22 PROCEDURE — 700102 HCHG RX REV CODE 250 W/ 637 OVERRIDE(OP): Performed by: STUDENT IN AN ORGANIZED HEALTH CARE EDUCATION/TRAINING PROGRAM

## 2018-10-22 RX ORDER — VITAMIN A ACETATE, BETA CAROTENE, ASCORBIC ACID, CHOLECALCIFEROL, .ALPHA.-TOCOPHEROL ACETATE, DL-, THIAMINE MONONITRATE, RIBOFLAVIN, NIACINAMIDE, PYRIDOXINE HYDROCHLORIDE, FOLIC ACID, CYANOCOBALAMIN, CALCIUM CARBONATE, FERROUS FUMARATE, ZINC OXIDE, CUPRIC OXIDE 3080; 12; 120; 400; 1; 1.84; 3; 20; 22; 920; 25; 200; 27; 10; 2 [IU]/1; UG/1; MG/1; [IU]/1; MG/1; MG/1; MG/1; MG/1; MG/1; [IU]/1; MG/1; MG/1; MG/1; MG/1; MG/1
1 TABLET, FILM COATED ORAL EVERY MORNING
Status: DISCONTINUED | OUTPATIENT
Start: 2018-10-22 | End: 2018-10-22 | Stop reason: HOSPADM

## 2018-10-22 RX ORDER — IBUPROFEN 600 MG/1
600 TABLET ORAL EVERY 6 HOURS PRN
Qty: 30 TAB | Refills: 1 | Status: SHIPPED | OUTPATIENT
Start: 2018-10-22 | End: 2020-05-05

## 2018-10-22 RX ORDER — VITAMIN A ACETATE, BETA CAROTENE, ASCORBIC ACID, CHOLECALCIFEROL, .ALPHA.-TOCOPHEROL ACETATE, DL-, THIAMINE MONONITRATE, RIBOFLAVIN, NIACINAMIDE, PYRIDOXINE HYDROCHLORIDE, FOLIC ACID, CYANOCOBALAMIN, CALCIUM CARBONATE, FERROUS FUMARATE, ZINC OXIDE, CUPRIC OXIDE 3080; 12; 120; 400; 1; 1.84; 3; 20; 22; 920; 25; 200; 27; 10; 2 [IU]/1; UG/1; MG/1; [IU]/1; MG/1; MG/1; MG/1; MG/1; MG/1; [IU]/1; MG/1; MG/1; MG/1; MG/1; MG/1
1 TABLET, FILM COATED ORAL EVERY MORNING
Qty: 30 TAB | Refills: 6 | Status: SHIPPED | OUTPATIENT
Start: 2018-10-23 | End: 2020-05-05

## 2018-10-22 RX ADMIN — IBUPROFEN 600 MG: 600 TABLET, FILM COATED ORAL at 09:59

## 2018-10-22 RX ADMIN — IBUPROFEN 600 MG: 600 TABLET, FILM COATED ORAL at 03:00

## 2018-10-22 RX ADMIN — Medication 1 TABLET: at 06:00

## 2018-10-22 RX ADMIN — DOCUSATE SODIUM 100 MG: 100 CAPSULE, LIQUID FILLED ORAL at 09:59

## 2018-10-22 ASSESSMENT — PAIN SCALES - GENERAL
PAINLEVEL_OUTOF10: 7
PAINLEVEL_OUTOF10: 4

## 2018-10-22 NOTE — PROGRESS NOTES
Discharge instruction for mom and baby discussed. Prescription given and explained. Questions and concerns have been answered. She placed the baby in the car seat. Both mom and baby doing well.

## 2018-10-22 NOTE — PROGRESS NOTES
1900: Report received from Pily ANDREWS. Patient stable.    2000:Assessment complete. Uterine fundus is firm, lochia is scant. VSS and WDL. Tolerating diet. Voiding without difficulty. Tolerating ambulation well.Pain controlled with prn medication (see MAR). Will offer scheduled pain medications as they become available. Bonding well with baby. All questions and concerns regarding POC are addressed. Call light within reach and patient encouraged to call for additional needs.    2200:  Patient breastfeeding with good latch. No other needs at this time.    0000:  Patient asleep    0200: Patient asleep    0300: Patient in pain. Given motrin and heat packs.     0400: Patient asleep.    0600: Patient given PNV. No other needs at this time.

## 2018-10-22 NOTE — PROGRESS NOTES
Report received at 0700. Assessment completed: VSS. Patient ambulates by self. Fundus firm, lochia light lochia. Patient states pain 3/10; medicated per MAR. Patient would like medication offered when available. Bed in lowest locked position. Call light in place. All questions and concerns addressed. Will continue to monitor.

## 2018-10-22 NOTE — DISCHARGE INSTRUCTIONS
POSTPARTUM DISCHARGE INSTRUCTIONS FOR MOM    YOB: 1995   Age: 22 y.o.               Admit Date: 10/19/2018     Discharge Date: 10/22/2018  Attending Doctor:  Terry Mock M.D.                  Allergies:  Nkda [no known drug allergy]    Discharged to home by car. Discharged via wheelchair, hospital escort: Yes.  Special equipment needed: Not Applicable  Belongings with: Personal  Be sure to schedule a follow-up appointment with your primary care doctor or any specialists as instructed.     Discharge Plan:   Diet Plan: Discussed  Activity Level: Discussed  Confirmed Symptoms Management: Discussed  Medication Reconciliation Updated: Yes  Influenza Vaccine Indication: Patient Refuses    REASONS TO CALL YOUR OBSTETRICIAN:  1.   Persistent fever or shaking chills (Temperature higher than 100.4)  2.   Heavy bleeding (soaking more than 1 pad per hour); Passing clots  3.   Foul odor from vagina  4.   Mastitis (Breast infection; breast pain, chills, fever, redness)  5.   Urinary pain, burning or frequency    8.   Severe depression longer than 24 hours    HAND WASHING  · Prior to handling the baby.  · Before breastfeeding or bottle feeding baby.  · After using the bathroom or changing the baby's diaper.    VAGINAL CARE  · Nothing inside vagina for 6 weeks: no sexual intercourse, tampons or douching.  · Bleeding may continue for 2-4 weeks.  Amount may vary.    · Call your physician for heavy bleeding which means soaking more than 1 pad per hour    BIRTH CONTROL  · It is possible to become pregnant at any time after delivery and while breastfeeding.  · Plan to discuss a method of birth control with your physician at your follow up visit. visit.    DIET AND ELIMINATION  · Eating more fiber (bran cereal, fruits, and vegetables) and drinking plenty of fluids will help to avoid constipation.  · Urinary frequency after childbirth is normal.    POSTPARTUM BLUES  During the first few days after birth, you may  "experience a sense of the \"blues\" which may include impatience, irritability or even crying.  These feeling come and go quickly.  However, as many as 1 in 10 women experience emotional symptoms known as postpartum depression.    Postpartum depression:  May start as early as the second or third day after delivery or take several weeks or months to develop.  Symptoms of \"blues\" are present, but are more intense:  Crying spells; loss of appetite; feelings of hopelessness or loss of control; fear of touching the baby; over concern or no concern at all about the baby; little or no concern about your own appearance/caring for yourself; and/or inability to sleep or excessive sleeping.  Contact your physician if you are experiencing any of these symptoms.    Crisis Hotline:  · [place] Crisis Hotline:  7-006-ARYOKVL  Or 1-745.635.3783  · [place] Crisis Hotline:  1-568.163.9854  Or 977-267-1918    PREVENTING SHAKEN BABY:  If you are angry or stressed, PUT THE BABY IN THE CRIB, step away, take some deep breaths, and wait until you are calm to care for the baby.  DO NOT SHAKE THE BABY.  You are not alone, call a supporter for help.    · Crisis Call Center 24/7 crisis line 332-243-1290 or 1-375.854.1129  · You can also text them, text \"ANSWER\" to 757234    QUIT SMOKING/TOBACCO USE:  I understand the use of any tobacco products increases my chance of suffering from future heart disease and could cause other illnesses which may shorten my life. Quitting the use of tobacco products is the single most important thing I can do to improve my health. For further information on smoking / tobacco cessation call a Toll Free Quit Line at 1-805.331.4801 (*National [employer] [place]) or 1-952.989.3530 (American Lung Association) or you can access the web based program at www.lungusa.org.    · [place] Tobacco Users Help Line:  (681) 130-8028       Toll Free: 1-446.422.5753  · Quit Tobacco Program [facility] " (363) 930-9109    DEPRESSION / SUICIDE RISK:  As you are discharged from this [facility] facility, it is important to learn how to keep safe from harming yourself.    Recognize the warning signs:  · Abrupt changes in personality, positive or negative- including increase in energy   · Giving away possessions  · Change in eating patterns- significant weight changes-  positive or negative  · Change in sleeping patterns- unable to sleep or sleeping all the time   · Unwillingness or inability to communicate  · Depression  · Unusual sadness, discouragement and loneliness  · Talk of wanting to die  · Neglect of personal appearance   · Rebelliousness- reckless behavior  · Withdrawal from people/activities they love  · Confusion- inability to concentrate     If you or a loved one observes any of these behaviors or has concerns about self-harm, here's what you can do:  · Talk about it- your feelings and reasons for harming yourself  · Remove any means that you might use to hurt yourself (examples: pills, rope, extension cords, firearm)  · Get professional help from the community (Mental Health, Substance Abuse, psychological counseling)  · Do not be alone:Call your Safe Contact- someone whom you trust who will be there for you.  · Call your local CRISIS HOTLINE 328-2260 or 445-607-6720  · Call your local Children's Mobile Crisis Response Team [place] (484) 196-3420 or www.[employer]  · Call the toll free National Suicide Prevention Hotlines   · National Suicide Prevention Lifeline 870-339-EQAL (9307)  · National Hope Line Network 800-SUICIDE (703-2008)    DISCHARGE SURVEY:  Thank you for choosing [facility].  We hope we provided you with very good care.  You may be receiving a survey in the mail.  Please fill it out.  Your opinion is valuable to us.    ADDITIONAL EDUCATIONAL MATERIALS GIVEN TO PATIENT:        My signature on this form indicates that:  1.  I have reviewed and understand the above  information  2.  My questions regarding this information have been answered to my satisfaction.  3.  I have formulated a plan with my discharge nurse to obtain my prescribed medication for home.

## 2018-10-22 NOTE — DISCHARGE PLANNING
Discharge Planning Assessment Post Partum    Reason for Referral: History of depression  Address: [address] [place] [address]. [address] [address]  Phone: [phone]  Type of Living Situation: living with parents and family  Mom Diagnosis: Pregnancy  Baby Diagnosis: [place]  Primary Language: Senegalese and English    Name of Baby: Marguerite Peguero (: 10/20/18)  Father of the Baby: Not involved  Involved in baby’s care? No  Contact Information: N/A    Prenatal Care: Yes  Mom's PCP: None  PCP for new baby: Pediatrician list provided    Support System: MOB's family and sister  Coping/Bonding between mother & baby: Yes  Source of Feeding: breast and bottle  Supplies for Infant: prepared for infant    Mom's Insurance: Medicaid  Baby Covered on Insurance:Yes  Mother Employed/School: No  Other children in the home/names & ages: Three other children: 8 year old daughter, 6 year old son, and 4 year old daughter    Financial Hardship/Income: No   Mom's Mental status: alert and oriented  Services used prior to admit: Medicaid, TANF, food stamps, and WIC    CPS History: denies  Psychiatric History: depression from .  Not on medication.  Had PP depression with her son.  Provided counseling resources.  Domestic Violence History: denies  Drug/ETOH History: denies    Resources Provided: Pediatrician list, children and family resource list, list of counseling resources  Referrals Made: diaper bank referral     Clearance for Discharge: Infant is cleared to discharge home with MOB.

## 2018-10-22 NOTE — CARE PLAN
Problem: Altered physiologic condition related to immediate post-delivery state and potential for bleeding/hemorrhage  Goal: Patient physiologically stable as evidenced by normal lochia, palpable uterine involution and vital signs within normal limits  Outcome: PROGRESSING AS EXPECTED  Uterus firm, lochia scant, pain controlled with PRN medications (see MAR). Lab values WDL for postpartum state. Cold/heat packs, extra blankets/pillows offered. VSS and WDL.     Problem: Potential for postpartum infection related to presence of episiotomy/vaginal tear and/or uterine contamination  Goal: Patient will be absent from signs and symptoms of infection  Outcome: PROGRESSING AS EXPECTED  No signs or symptoms of infection. VSS and WDL.

## 2018-10-22 NOTE — CARE PLAN
Problem: Potential knowledge deficit related to lack of understanding of self and  care  Goal: Patient will demonstrate ability to care for self and infant  Outcome: PROGRESSING AS EXPECTED  Patient demonstrates safe self and infant care.     Problem: Potential anxiety related to difficulty adapting to parental role  Goal: Patient will verbalize and demonstrate effective bonding and parenting behavior  Outcome: PROGRESSING AS EXPECTED  Patient providing all cares for infant.

## 2018-10-22 NOTE — DISCHARGE SUMMARY
Discharge Summary:      Sharyn Mejias      Admit Date:   10/19/2018  Discharge Date:  10/22/2018     Admitting diagnosis:  Pregnancy  IOL  Labor and delivery, indication for care  Discharge Diagnosis: Status post vaginal, spontaneous.  Pregnancy Complications: none  Tubal Ligation:  no        History:  Past Medical History:   Diagnosis Date   • Depression     postpartum depression   • Ovarian cyst 11     OB History    Para Term  AB Living   4 4 4     4   SAB TAB Ectopic Molar Multiple Live Births           0 4      # Outcome Date GA Lbr Chad/2nd Weight Sex Delivery Anes PTL Lv   4 Term 10/20/18 39w2d  4.015 kg (8 lb 13.6 oz) F Vag-Spont EPI N SANCHEZ   3 Term 14 39w0d  3.856 kg (8 lb 8 oz) F Vag-Spont   SANCHEZ   2 Term 12 41w2d  3.544 kg (7 lb 13 oz) M Vag-Spont EPI  SANCHEZ      Birth Comments: baby was not breathing when born    1 Term 07/01/10 40w6d 04:00 4.026 kg (8 lb 14 oz) F Vag-Spont None N SANCHEZ      Birth Comments: no complications           Nkda [no known drug allergy]  Patient Active Problem List    Diagnosis Date Noted   •  (normal spontaneous vaginal delivery) 10/22/2018   • Polyhydramnios 2018   • Encounter for supervision of other normal pregnancy, third trimester 2018   • History of macrosomia in infant - 8lb 14oz, early 1hr GTT elevated, 3hr GTT wnl 2018   • Depression 2012   • Migraine         Hospital Course:   22 y.o. , now para 4, was admitted with the above mentioned diagnosis, underwent Induction of Labor, vaginal, spontaneous. Patient postpartum course was unremarkable, with progressive advancement in diet , ambulation and toleration of oral analgesia. Patient without complaints today and desires discharge.      Vitals:    10/20/18 2000 10/21/18 0000 10/21/18 0800 10/21/18 2000   BP: 102/65 (!) 91/56 106/62 (!) 98/71   Pulse: 87 62 77 83   Resp: 16 18 17 16   Temp: 36.2 °C (97.2 °F) 36.6 °C (97.9 °F) 36.2 °C (97.2  °F) 36.5 °C (97.7 °F)   TempSrc:       SpO2: 93% 98% 93% 96%   Weight:       Height:           Current Facility-Administered Medications   Medication Dose   • prenatal plus vitamin (STUARTNATAL 1+1) 27-1 MG tablet 1 Tab  1 Tab   • D5LR infusion     • LR infusion     • bisacodyl (DULCOLAX) suppository 10 mg  10 mg   • docusate sodium (COLACE) capsule 100 mg  100 mg   • magnesium hydroxide (MILK OF MAGNESIA) suspension 30 mL  30 mL   • PRN oxytocin (PITOCIN) (20 Units/1000 mL) PRN for excessive uterine bleeding - See Admin Instr  125-999 mL/hr   • ondansetron (ZOFRAN ODT) dispertab 4 mg  4 mg    Or   • ondansetron (ZOFRAN) syringe/vial injection 4 mg  4 mg   • oxytocin (PITOCIN) infusion (for induction)  0.5-20 selin-units/min   • oxytocin (PITOCIN) infusion (for postpartum)   mL/hr   • ibuprofen (MOTRIN) tablet 600 mg  600 mg   • acetaminophen (TYLENOL) tablet 650 mg  650 mg   • HYDROcodone-acetaminophen (NORCO) 5-325 MG per tablet 2 Tab  2 Tab       Exam:  Breast Exam: negative  Abdomen: Abdomen soft, non-tender. BS normal. No masses,  No organomegaly  Fundus Non Tender: yes  Incision: none  Perineum: perineum intact  Extremity: extremities, peripheral pulses and reflexes normal     Labs:  Recent Labs      10/19/18   1240  10/20/18   2204   WBC  11.2*  15.3*   RBC  4.53  3.95*   HEMOGLOBIN  12.0  10.9*   HEMATOCRIT  37.4  33.0*   MCV  82.6  83.5   MCH  26.5*  27.6   MCHC  32.1*  33.0*   RDW  40.4  41.1   PLATELETCT  303  257   MPV  10.9  10.8        Activity:   Discharge to home  Pelvic Rest x 6 weeks    Assessment:  normal postpartum course  Discharge Assessment: No areas of skin breakdown/redness; surgical incision intact/healing     Follow up: .C or [facility] in 5 weeks for vaginal ; 1 week for incision check.      Discharge Meds:   Current Outpatient Prescriptions   Medication Sig Dispense Refill   • ibuprofen (MOTRIN) 600 MG Tab Take 1 Tab by mouth every 6 hours as needed (For cramping after  delivery; do not give if patient is receiving ketorolac (Toradol)). 30 Tab 1   • [START ON 10/23/2018] prenatal plus vitamin (STUARTNATAL 1+1) 27-1 MG Tab tablet Take 1 Tab by mouth every morning. 30 Tab 6       Indu Ernst D.N.P.

## 2018-11-27 ENCOUNTER — POST PARTUM (OUTPATIENT)
Dept: OBGYN | Facility: CLINIC | Age: 23
End: 2018-11-27

## 2018-11-27 VITALS — DIASTOLIC BLOOD PRESSURE: 68 MMHG | SYSTOLIC BLOOD PRESSURE: 122 MMHG | BODY MASS INDEX: 42.13 KG/M2 | WEIGHT: 261 LBS

## 2018-11-27 PROBLEM — Z34.83 ENCOUNTER FOR SUPERVISION OF OTHER NORMAL PREGNANCY, THIRD TRIMESTER: Status: RESOLVED | Noted: 2018-03-30 | Resolved: 2018-11-27

## 2018-11-27 PROBLEM — O09.291 HISTORY OF MACROSOMIA IN INFANT IN PRIOR PREGNANCY, CURRENTLY PREGNANT IN FIRST TRIMESTER: Status: RESOLVED | Noted: 2018-03-30 | Resolved: 2018-11-27

## 2018-11-27 PROBLEM — O40.9XX0 POLYHYDRAMNIOS: Status: RESOLVED | Noted: 2018-09-18 | Resolved: 2018-11-27

## 2018-11-27 PROCEDURE — 90050 PR POSTPARTUM VISIT: CPT | Performed by: NURSE PRACTITIONER

## 2018-11-27 NOTE — PATIENT INSTRUCTIONS
Plan   Plan:    1. Breastfeeding support.  Encourage SBE.  2. Continue PNV   3. Contraceptive counseling - follow up w health dept or [facility] for IUD placement.  4. Encouraged condom use   5. Discussed diet, exercise and resumption of sexual activity   6. Gave copy of pap , f/u 3yr  7.  F/u c PCP or [facility] clinic as needed for primary care needs.

## 2018-11-27 NOTE — PROGRESS NOTES
Pt here today for postpartum exam.  Delivery type: 10/20/2018  Currently : breat and Bottle Feeding   Desired BCM:IUD, Paragard    LMP: Not since Delivery   Last pap:2018 WNL   Phone # [phone]  C/O: None

## 2018-11-27 NOTE — LETTER
November 27, 2018         Patient: Sharyn Mejias   YOB: 1995   Date of Visit: 11/27/2018           To Whom it May Concern:    Sharyn Mejias was seen in my clinic on 11/27/2018. She may return to work on 11/28/18.  She may return to full duty without any restrictions.    If you have any questions or concerns, please don't hesitate to call.        Sincerely,           Kirsten Marshall, C.N.M.  Electronically Signed

## 2018-11-27 NOTE — PROGRESS NOTES
Subjective:    Sharyn Mejias is a 23 y.o.  female who presents for her postpartum exam. She had  without complication. Her prenatal course was uncomplicated. She denies dysuria, vaginal bleeding, odor, itching or breast problems. She is breast and bottlefeeding. She desires an IUD for her birth control method. Reports no sex prior to this appointment.  Denies any S/S of PP depression.      Postpartum care          HPI  Review of Systems   All other systems reviewed and are negative.         Objective:     /68   Wt 118.4 kg (261 lb)   LMP 2017 (Exact Date)   BMI 42.13 kg/m²      Physical Exam   Constitutional: She is oriented to person, place, and time. She appears well-developed and well-nourished.   HENT:   Head: Normocephalic and atraumatic.   Eyes:   Eye and ear exam deferred   Neck: Normal range of motion. Neck supple. No thyromegaly present.   Cardiovascular: Normal rate, regular rhythm, normal heart sounds and intact distal pulses.    Pulmonary/Chest: Effort normal and breath sounds normal. Right breast exhibits no inverted nipple, no mass, no nipple discharge, no skin change and no tenderness. Left breast exhibits no inverted nipple, no mass, no nipple discharge, no skin change and no tenderness. Breasts are symmetrical. There is no breast swelling.   Lactating   Abdominal: Soft. Normal appearance and bowel sounds are normal. There is no CVA tenderness.   Genitourinary: Vagina normal and uterus normal. No breast tenderness, discharge or bleeding. Pelvic exam was performed with patient supine. No labial fusion. There is no rash, tenderness, lesion or injury on the right labia. There is no rash, tenderness, lesion or injury on the left labia. Cervix exhibits no motion tenderness, no discharge and no friability. Right adnexum displays no mass, no tenderness and no fullness. Left adnexum displays no mass, no tenderness and no fullness.   Musculoskeletal: Normal range  of motion.   Neurological: She is alert and oriented to person, place, and time. She has normal reflexes.   Skin: Skin is warm and dry. Capillary refill takes less than 2 seconds.   Psychiatric: She has a normal mood and affect. Her behavior is normal. Judgment and thought content normal.   Nursing note and vitals reviewed.     Assessment   Assessment:    1. PP care of lactating women   2. Exam WNL   3. Pap WNL  4. Desires contraception   Patient Active Problem List   Diagnosis   • Postpartum care and examination of lactating mother     Plan   Plan:    1. Breastfeeding support.  Encourage SBE.  2. Continue PNV   3. Contraceptive counseling - follow up w health dept or [facility] for IUD placement.  4. Encouraged condom use   5. Discussed diet, exercise and resumption of sexual activity   6. Gave copy of pap , f/u 3yr  7.  F/u c PCP or [facility] clinic as needed for primary care needs.     Chaperone offered and provided by Mercedes Olivo MA.

## 2020-05-05 ENCOUNTER — APPOINTMENT (OUTPATIENT)
Dept: RADIOLOGY | Facility: MEDICAL CENTER | Age: 25
End: 2020-05-05
Attending: EMERGENCY MEDICINE

## 2020-05-05 ENCOUNTER — HOSPITAL ENCOUNTER (EMERGENCY)
Facility: MEDICAL CENTER | Age: 25
End: 2020-05-06
Attending: EMERGENCY MEDICINE

## 2020-05-05 DIAGNOSIS — O20.0 THREATENED MISCARRIAGE: ICD-10-CM

## 2020-05-05 DIAGNOSIS — O41.8X10 SUBCHORIONIC HEMORRHAGE OF PLACENTA IN FIRST TRIMESTER, SINGLE OR UNSPECIFIED FETUS: ICD-10-CM

## 2020-05-05 DIAGNOSIS — O46.8X1 SUBCHORIONIC HEMORRHAGE OF PLACENTA IN FIRST TRIMESTER, SINGLE OR UNSPECIFIED FETUS: ICD-10-CM

## 2020-05-05 LAB
ALBUMIN SERPL BCP-MCNC: 3.4 G/DL (ref 3.2–4.9)
ALBUMIN/GLOB SERPL: 1 G/DL
ALP SERPL-CCNC: 68 U/L (ref 30–99)
ALT SERPL-CCNC: 15 U/L (ref 2–50)
ANION GAP SERPL CALC-SCNC: 11 MMOL/L (ref 7–16)
APPEARANCE UR: CLEAR
AST SERPL-CCNC: 13 U/L (ref 12–45)
B-HCG SERPL-ACNC: ABNORMAL MIU/ML (ref 0–5)
BASOPHILS # BLD AUTO: 0.4 % (ref 0–1.8)
BASOPHILS # BLD: 0.05 K/UL (ref 0–0.12)
BILIRUB SERPL-MCNC: <0.2 MG/DL (ref 0.1–1.5)
BILIRUB UR QL STRIP.AUTO: NEGATIVE
BUN SERPL-MCNC: 11 MG/DL (ref 8–22)
CALCIUM SERPL-MCNC: 8.4 MG/DL (ref 8.5–10.5)
CHLORIDE SERPL-SCNC: 102 MMOL/L (ref 96–112)
CO2 SERPL-SCNC: 22 MMOL/L (ref 20–33)
COLOR UR: YELLOW
CREAT SERPL-MCNC: 0.53 MG/DL (ref 0.5–1.4)
EOSINOPHIL # BLD AUTO: 0.31 K/UL (ref 0–0.51)
EOSINOPHIL NFR BLD: 2.7 % (ref 0–6.9)
ERYTHROCYTE [DISTWIDTH] IN BLOOD BY AUTOMATED COUNT: 43.8 FL (ref 35.9–50)
GLOBULIN SER CALC-MCNC: 3.3 G/DL (ref 1.9–3.5)
GLUCOSE SERPL-MCNC: 96 MG/DL (ref 65–99)
GLUCOSE UR STRIP.AUTO-MCNC: NEGATIVE MG/DL
HCT VFR BLD AUTO: 40.2 % (ref 37–47)
HGB BLD-MCNC: 13.2 G/DL (ref 12–16)
IMM GRANULOCYTES # BLD AUTO: 0.03 K/UL (ref 0–0.11)
IMM GRANULOCYTES NFR BLD AUTO: 0.3 % (ref 0–0.9)
KETONES UR STRIP.AUTO-MCNC: NEGATIVE MG/DL
LEUKOCYTE ESTERASE UR QL STRIP.AUTO: NEGATIVE
LYMPHOCYTES # BLD AUTO: 2.98 K/UL (ref 1–4.8)
LYMPHOCYTES NFR BLD: 25.5 % (ref 22–41)
MCH RBC QN AUTO: 28.1 PG (ref 27–33)
MCHC RBC AUTO-ENTMCNC: 32.8 G/DL (ref 33.6–35)
MCV RBC AUTO: 85.7 FL (ref 81.4–97.8)
MICRO URNS: NORMAL
MONOCYTES # BLD AUTO: 0.62 K/UL (ref 0–0.85)
MONOCYTES NFR BLD AUTO: 5.3 % (ref 0–13.4)
NEUTROPHILS # BLD AUTO: 7.7 K/UL (ref 2–7.15)
NEUTROPHILS NFR BLD: 65.8 % (ref 44–72)
NITRITE UR QL STRIP.AUTO: NEGATIVE
NRBC # BLD AUTO: 0 K/UL
NRBC BLD-RTO: 0 /100 WBC
PH UR STRIP.AUTO: 7.5 [PH] (ref 5–8)
PLATELET # BLD AUTO: 299 K/UL (ref 164–446)
PMV BLD AUTO: 10.2 FL (ref 9–12.9)
POTASSIUM SERPL-SCNC: 3.9 MMOL/L (ref 3.6–5.5)
PROT SERPL-MCNC: 6.7 G/DL (ref 6–8.2)
PROT UR QL STRIP: NEGATIVE MG/DL
RBC # BLD AUTO: 4.69 M/UL (ref 4.2–5.4)
RBC UR QL AUTO: NEGATIVE
SODIUM SERPL-SCNC: 135 MMOL/L (ref 135–145)
SP GR UR STRIP.AUTO: 1.02
UROBILINOGEN UR STRIP.AUTO-MCNC: 1 MG/DL
WBC # BLD AUTO: 11.7 K/UL (ref 4.8–10.8)

## 2020-05-05 PROCEDURE — 76801 OB US < 14 WKS SINGLE FETUS: CPT

## 2020-05-05 PROCEDURE — 85025 COMPLETE CBC W/AUTO DIFF WBC: CPT

## 2020-05-05 PROCEDURE — 84702 CHORIONIC GONADOTROPIN TEST: CPT

## 2020-05-05 PROCEDURE — 99284 EMERGENCY DEPT VISIT MOD MDM: CPT

## 2020-05-05 PROCEDURE — 81003 URINALYSIS AUTO W/O SCOPE: CPT

## 2020-05-05 PROCEDURE — 80053 COMPREHEN METABOLIC PANEL: CPT

## 2020-05-05 ASSESSMENT — FIBROSIS 4 INDEX: FIB4 SCORE: 0.38

## 2020-05-06 VITALS
WEIGHT: 236.77 LBS | BODY MASS INDEX: 38.05 KG/M2 | HEART RATE: 84 BPM | DIASTOLIC BLOOD PRESSURE: 60 MMHG | RESPIRATION RATE: 16 BRPM | SYSTOLIC BLOOD PRESSURE: 116 MMHG | HEIGHT: 66 IN | TEMPERATURE: 97.3 F | OXYGEN SATURATION: 96 %

## 2020-05-06 PROCEDURE — 99284 EMERGENCY DEPT VISIT MOD MDM: CPT

## 2020-05-06 NOTE — ED PROVIDER NOTES
ED Provider Note      Means of Arrival: Private vehicle  History obtained from: Patient, medical record      CHIEF COMPLAINT  Chief Complaint   Patient presents with   • Vaginal Bleeding     Pt states she just found out she was pregnant and started having cramping and bleeding since last night. Pt states he is unsure how far along she is; pt states she took at home test. Pt states spotting.    • Cramps       HPI  Sharyn Mejias is a 24 y.o. female who presents with abdominal cramping and vaginal bleeding.  Patient reports her last menstrual period was March 8.  She is G5, P4.  She reports mild abdominal cramping and spotting of blood.  Denies passage of any tissue.  It onset yesterday evening.  Denies any flank pain, however does report some burning with urine.    REVIEW OF SYSTEMS  CONSTITUTIONAL:  No fever.  CARDIOVASCULAR:  No chest discomfort.  RESPIRATORY:  No pleuritic chest pain.  GASTROINTESTINAL:   See HPI  GENITOURINARY:   see HPI  MUSCULOSKELETAL:  No arthralgia.  SKIN:  No rash or suspicious lesions.  NEUROLOGIC:   No headache.  ENDOCRINE:  No facial edema.  HEMATOLOGIC:  No abnormal bleeding.       See HPI for further details.   All other systems are negative.     PAST MEDICAL HISTORY  Past Medical History:   Diagnosis Date   • Depression 2012    postpartum depression   • Ovarian cyst 6/25/13       FAMILY HISTORY  Family History   Problem Relation Age of Onset   • Diabetes Mother         insulin   • Hyperlipidemia Father    • Hypertension Father    • Hypertension Maternal Aunt    • Diabetes Maternal Aunt         pills   • Diabetes Maternal Grandmother         pills   • Hypertension Paternal Grandmother    • Hyperlipidemia Paternal Grandmother        SOCIAL HISTORY   reports that she has been smoking. She has never used smokeless tobacco. She reports previous drug use. She reports that she does not drink alcohol.    SURGICAL HISTORY  History reviewed. No pertinent surgical  "history.    CURRENT MEDICATIONS  Home Medications     Reviewed by Kirsten Epperson R.N. (Registered Nurse) on 05/05/20 at 2121  Med List Status: Complete   Medication Last Dose Status        Patient Maxime Taking any Medications                       ALLERGIES  Allergies   Allergen Reactions   • Nkda [No Known Drug Allergy]        PHYSICAL EXAM  VITAL SIGNS: /60   Pulse 84   Temp 36.3 °C (97.3 °F) (Temporal)   Resp 16   Ht 1.676 m (5' 6\")   Wt 107.4 kg (236 lb 12.4 oz)   LMP  (LMP Unknown)   SpO2 96%   BMI 38.22 kg/m²    Gen: Alert  HENT: ATNC  Eyes: Normal conjunctiva  Neck: trachea midline  Resp: no respiratory distress  CV: No JVD  Abd: non-distended, nontender  : No CVA tenderness  Pelvic: Os closed, no active bleeding  Ext: No deformities  Psych: normal mood  Neuro: speech fluent       RADIOLOGY/PROCEDURES  US-OB 1ST TRIMESTER WITH TRANSVAGINAL (COMBO)   Final Result      1.  Single live intrauterine gestation with sonographic gestational age 6 weeks 3 days and estimated due date 12/26/2020.   2.  Subchorionic hemorrhage.          LABS  Labs Reviewed   CBC WITH DIFFERENTIAL - Abnormal; Notable for the following components:       Result Value    WBC 11.7 (*)     MCHC 32.8 (*)     Neutrophils (Absolute) 7.70 (*)     All other components within normal limits   COMP METABOLIC PANEL - Abnormal; Notable for the following components:    Calcium 8.4 (*)     All other components within normal limits   HCG QUANTITATIVE - Abnormal; Notable for the following components:    Bhcg 62516.0 (*)     All other components within normal limits   URINALYSIS,CULTURE IF INDICATED   ESTIMATED GFR          COURSE & MEDICAL DECISION MAKING  Pertinent Labs & Imaging studies reviewed. (See chart for details)    Patient presents with pregnancy, cramping and bleeding concerning for possible miscarriage.  Pelvic exam demonstrates closed office, beta-hCG reassuringly elevated, ultrasound demonstrates intrauterine pregnancy with " heartbeat.  Appears to be threatened miscarriage.  There is a subchorionic hemorrhage which was discussed with the patient.  She was counseled on smoking cessation, advised to take prenatal vitamins, and given guidance on medication safe to take during pregnancy.  She will be referred to the pregnancy center for follow-up.  No evidence of urinary tract infection on urinalysis.  Low suspicion for appendicitis or intra-abdominal infection.  Low suspicion for heterotopic pregnancy.    Review of the medical record demonstrates patient is Rh+    FINAL IMPRESSION  1. Threatened miscarriage    2. Subchorionic hemorrhage of placenta in first trimester, single or unspecified fetus             DISPOSITION:  Patient will be discharged home in stable condition.    FOLLOW UP:  [facility]  [address] [place] [id number]  [phone]    Schedule an appointment as soon as possible for a visit         OUTPATIENT MEDICATIONS:  New Prescriptions    No medications on file

## 2020-05-06 NOTE — ED TRIAGE NOTES
Sharyn Mejias  24 y.o. female  Chief Complaint   Patient presents with   • Vaginal Bleeding     Pt states she just found out she was pregnant and started having cramping and bleeding since last night. Pt states he is unsure how far along she is; pt states she took at home test. Pt states spotting.    • Cramps     Pt amb to triage with steady gait for above complaint. Pt provided urine cup.   Pt is alert and oriented, speaking in full sentences, follows commands and responds appropriately to questions. Resp are even and unlabored. No behavioral indicators of pain.   Pt placed in lobby. Pt educated on triage process. Pt encouraged to alert staff for any changes.

## 2020-05-06 NOTE — DISCHARGE INSTRUCTIONS
You were seen emergency department for pelvic cramping and vaginal bleeding.  Your labs and ultrasound were reassuring.  This appears to be a threatened miscarriage, and does not necessarily mean that you are having a miscarriage.  Your ultrasound did show a small amount of bleeding under the placenta.  This is common in pregnancy and should be followed up with your OB/GYN.    Please schedule appointment with the pregnancy center or another OB/GYN for follow-up.    We recommend you quit smoking.    Please take prenatal vitamins.    For pain, you may take Tylenol (acetaminophen), 1000 mg every 8 hours.  Please do not exceed 3000 mg of acetaminophen from any source in a 24-hour period.    Please avoid use of Motrin, Aleve, Advil as these are not safe in pregnancy.    Please return to the emergency department or seek medical attention if you develop:  Severe abdominal pain, excessive bleeding leading to lightheadedness, fever, any other new or concerning findings    ================================  Coronavirus Information    Your visit did NOT relate to coronavirus, but if you or your family develop symptoms that concern you for coronavirus (please see CDC website for symptoms), please contact the [facility] hotline (or your local health department)  or your healthcare provider before going to a medical facility:    [facility]  Daytime hours: [phone]  Anytime: 311    Information is available from the Centers for Disease Control and Prevention  www.CDC.gov    and     [facility]  [email or web address]    If you are severely ill or having a hard time breathing, please immediatly seek medical care. Notify the  or Emergency Department Triage about your symptoms.

## 2020-05-12 ENCOUNTER — APPOINTMENT (OUTPATIENT)
Dept: OBGYN | Facility: CLINIC | Age: 25
End: 2020-05-12

## 2020-06-02 ENCOUNTER — HOSPITAL ENCOUNTER (OUTPATIENT)
Facility: MEDICAL CENTER | Age: 25
End: 2020-06-02
Attending: NURSE PRACTITIONER
Payer: COMMERCIAL

## 2020-06-02 ENCOUNTER — HOSPITAL ENCOUNTER (OUTPATIENT)
Dept: LAB | Facility: MEDICAL CENTER | Age: 25
End: 2020-06-02
Attending: NURSE PRACTITIONER
Payer: COMMERCIAL

## 2020-06-02 ENCOUNTER — INITIAL PRENATAL (OUTPATIENT)
Dept: OBGYN | Facility: CLINIC | Age: 25
End: 2020-06-02

## 2020-06-02 VITALS
SYSTOLIC BLOOD PRESSURE: 106 MMHG | BODY MASS INDEX: 38.09 KG/M2 | WEIGHT: 237 LBS | HEIGHT: 66 IN | DIASTOLIC BLOOD PRESSURE: 76 MMHG

## 2020-06-02 DIAGNOSIS — O09.91 ENCOUNTER FOR SUPERVISION OF HIGH RISK PREGNANCY IN FIRST TRIMESTER, ANTEPARTUM: ICD-10-CM

## 2020-06-02 DIAGNOSIS — O09.91 ENCOUNTER FOR SUPERVISION OF HIGH RISK PREGNANCY IN FIRST TRIMESTER, ANTEPARTUM: Primary | ICD-10-CM

## 2020-06-02 DIAGNOSIS — O09.299 HISTORY OF MACROSOMIA IN INFANT IN PRIOR PREGNANCY, CURRENTLY PREGNANT: ICD-10-CM

## 2020-06-02 DIAGNOSIS — Z34.90 ENCOUNTER FOR SUPERVISION OF NORMAL PREGNANCY, ANTEPARTUM, UNSPECIFIED GRAVIDITY: ICD-10-CM

## 2020-06-02 DIAGNOSIS — B00.9 HERPES SIMPLEX VIRUS TYPE 2 (HSV-2) INFECTION AFFECTING PREGNANCY IN FIRST TRIMESTER: ICD-10-CM

## 2020-06-02 DIAGNOSIS — O98.511 HERPES SIMPLEX VIRUS TYPE 2 (HSV-2) INFECTION AFFECTING PREGNANCY IN FIRST TRIMESTER: ICD-10-CM

## 2020-06-02 DIAGNOSIS — Z20.2 POTENTIAL EXPOSURE TO STD: ICD-10-CM

## 2020-06-02 LAB
ABO GROUP BLD: NORMAL
APPEARANCE UR: CLEAR
BACTERIA #/AREA URNS HPF: ABNORMAL /HPF
BILIRUB UR STRIP-MCNC: NORMAL MG/DL
BLD GP AB SCN SERPL QL: NORMAL
COLOR UR AUTO: YELLOW
EPI CELLS #/AREA URNS HPF: ABNORMAL /HPF
GLUCOSE UR STRIP.AUTO-MCNC: NEGATIVE MG/DL
HYALINE CASTS #/AREA URNS LPF: ABNORMAL /LPF
KETONES UR STRIP.AUTO-MCNC: NEGATIVE MG/DL
LEUKOCYTE ESTERASE UR QL STRIP.AUTO: NEGATIVE
NITRITE UR QL STRIP.AUTO: NEGATIVE
PH UR STRIP.AUTO: 6 [PH] (ref 5–8)
PROT UR QL STRIP: NEGATIVE MG/DL
RBC # URNS HPF: ABNORMAL /HPF
RBC UR QL AUTO: NEGATIVE
RH BLD: NORMAL
SP GR UR STRIP.AUTO: 1.02
UROBILINOGEN UR STRIP-MCNC: NORMAL MG/DL
WBC #/AREA URNS HPF: ABNORMAL /HPF

## 2020-06-02 PROCEDURE — 81002 URINALYSIS NONAUTO W/O SCOPE: CPT | Performed by: NURSE PRACTITIONER

## 2020-06-02 PROCEDURE — 8198 PREG CTR PKG RATE (SYSTEM): Performed by: NURSE PRACTITIONER

## 2020-06-02 PROCEDURE — 0500F INITIAL PRENATAL CARE VISIT: CPT | Performed by: NURSE PRACTITIONER

## 2020-06-02 RX ORDER — ONDANSETRON 4 MG/1
4 TABLET, ORALLY DISINTEGRATING ORAL EVERY 6 HOURS PRN
Qty: 28 TAB | Refills: 0 | Status: SHIPPED | OUTPATIENT
Start: 2020-06-02 | End: 2020-06-09

## 2020-06-02 ASSESSMENT — EDINBURGH POSTNATAL DEPRESSION SCALE (EPDS)
THINGS HAVE BEEN GETTING ON TOP OF ME: NO, MOST OF THE TIME I HAVE COPED QUITE WELL
I HAVE BLAMED MYSELF UNNECESSARILY WHEN THINGS WENT WRONG: NOT VERY OFTEN
THE THOUGHT OF HARMING MYSELF HAS OCCURRED TO ME: NEVER
I HAVE FELT SCARED OR PANICKY FOR NO GOOD REASON: YES, SOMETIMES
I HAVE BEEN SO UNHAPPY THAT I HAVE BEEN CRYING: ONLY OCCASIONALLY
I HAVE BEEN SO UNHAPPY THAT I HAVE HAD DIFFICULTY SLEEPING: NOT VERY OFTEN
I HAVE LOOKED FORWARD WITH ENJOYMENT TO THINGS: RATHER LESS THAN I USED TO
I HAVE BEEN ABLE TO LAUGH AND SEE THE FUNNY SIDE OF THINGS: AS MUCH AS I ALWAYS COULD
TOTAL SCORE: 9
I HAVE FELT SAD OR MISERABLE: NOT VERY OFTEN
I HAVE BEEN ANXIOUS OR WORRIED FOR NO GOOD REASON: HARDLY EVER

## 2020-06-02 ASSESSMENT — ENCOUNTER SYMPTOMS
GASTROINTESTINAL NEGATIVE: 1
CONSTITUTIONAL NEGATIVE: 1
CARDIOVASCULAR NEGATIVE: 1
EYES NEGATIVE: 1
MUSCULOSKELETAL NEGATIVE: 1
PSYCHIATRIC NEGATIVE: 1
RESPIRATORY NEGATIVE: 1
NEUROLOGICAL NEGATIVE: 1

## 2020-06-02 ASSESSMENT — FIBROSIS 4 INDEX: FIB4 SCORE: 0.29

## 2020-06-02 NOTE — LETTER
Cystic Fibrosis Carrier Testing  Sharyn Mejias    The following information is about a blood test that can be done to determine if you and/or your partner carry the gene for cystic fibrosis.    WHAT IS CYSTIC FIBROSIS?  · Cystic fibrosis (CF) is an inherited disease that affects more than 25,000 American children and young adults.  · Symptoms of CF vary but include lung congestion, pneumonia, diarrhea and poor growth.  Most people with CF have severe medical problems and some die at a young age.  Others have so few symptoms they are unaware they have CF.  · CF does not affect intelligence.  · Although there is no cure for CF at this time, scientists are making progress in improving treatment and in searching for a cure.  In the past many people with CF  at a very young age.  Today, many are living into their 20’s and 30’s.    IS THERE A CHANCE MY BABY COULD HAVE CYSTIC FIBROSIS?  · You can have a child with CF even if there is no history in your family (see chart below).  · CF testing can help determine if you are a carrier and at risk to have a child with CF.  Note: if both parents are carriers, there is a 1 in 4 (25%) chance with each pregnancy that they will have a child with CF.  · Carriers have one normal CF gene and one altered CF gene.  · People with CF have two altered CF genes.  · Most people have two normal copies of the CF gene.    Approximate risk that a couple with no family history of cystic fibrosis will have a child with cystic fibrosis:    Ethnic background / Risk     couple:  1 in 2,500   couple:  1 in 15,000            couple:  1 in 8,000     American couple:  1 in 32,000     WHAT TESTING IS AVAILABLE?  · There is a blood test that can be done to find out if you or your partner is a carrier.  · It is important to understand that CF carrier testing does not detect all CF carriers.  · If the test shows that you are both CF carriers, you  unborn baby can be tested to find out if the baby has CF.    HOW MUCH DOES IT COST TO HAVE CYSTIC FIBROSIS CARRIER TESTING?  · Cost and insurance coverage for CF carrier testing vary depending upon the laboratory used and your insurance policy.  · The average cost for CF carrier testing is $300 per person.  · Your genetic counselor can provide you with more information about cystic fibrosis carrier testing.    _____  Yes, I am interested in discussing carrier testing with a genetic counselor.    _____  No, I am not interested in CF carrier testing or in receiving more information about CF carrier testing.      Client signature: ________________________________________  6/2/2020

## 2020-06-02 NOTE — NON-PROVIDER
NOB today  LMP: unkown  Last pap: 05/24/2018, negative  Phone # [phone]  Pharmacy confirmed  C/o lots of n/v, would like to know her options for this. Patient has been having some pelvic pain as well.  AFP desires   CF declined today.

## 2020-06-02 NOTE — PROGRESS NOTES
"Subjective:      S:  Sharyn Mejias is a 24 y.o.  female  @ She is 10w3d with an KIRA of: 20 based off of US  who presents for her new OB exam.      Her OB hx includes 4 vaginal deliveries with macrosomia with her last preganncy.   History of HSV I or II in self or partner: yes  History of STIs in self or partner: yes-GCCT  History of Thyroid problems: no    One ER visits in this pregnancy for bleeding and cramping. Her dating is based off that US. She reports severe N/V and has lower abd cramping.  Desires AFP when appropriate.  Declines CF.  Reports no FM, VB, or LOF.  Denies dysuria, vaginal DC.  Pt is single and lives with FOB and children. FOB is happy with pregnancy. She is currently working as homemaker, no heavy lifting, no chemical exposure.  Pregnancy is unplanned but welcomed.      O:    Vitals:    20 0824   BP: 106/76   Weight: 107.5 kg (237 lb)   Height: 1.676 m (5' 6\")    See H&P Prenatal Physical.  Wet mount: not incdicated        FHTs: 160        Fundal ht: 10cm     A:   1.  IUP @ 10w3d KIRA: Estimated Date of Delivery: 20 per US in ED         2.  S=D        3.    Patient Active Problem List    Diagnosis Date Noted   • Encounter for supervision of high risk pregnancy in first trimester, antepartum 2020   • History of macrosomia in infant in prior pregnancy, currently pregnant 2020   • Potential exposure to STD-history of STDs  2020   • Herpes simplex virus type 2 (HSV-2) infection affecting pregnancy in first trimester 2020         P:  1.  GC/CT done. Pap negative in 2018.         2.  Prenatal labs and UDS ordered - lab slip given        3.  Discussed diet and exercise during pregnancy. Encouraged good nutrition, and daily exercise including walking or swimming. Discussed expected weight gain during pregnancy.              4.  Discussed adequate hydration during pregnancy.        5.  NOB packet given        6.  Return to office " "in 4 wks        7.  Complete OB US in 9-10 wks        8.  Pregnancy guide provided        9.  Childbirth education discussed. Not a candidate for Centering Pregnancy       10. Rx for zofran, discussed risks and benefits of use prior to 2nd T.    HPI    Review of Systems   Constitutional: Negative.    HENT: Negative.    Eyes: Negative.    Respiratory: Negative.    Cardiovascular: Negative.    Gastrointestinal: Negative.    Genitourinary: Negative.    Musculoskeletal: Negative.    Skin: Negative.    Neurological: Negative.    Endo/Heme/Allergies: Negative.    Psychiatric/Behavioral: Negative.           Objective:     /76   Ht 1.676 m (5' 6\")   Wt 107.5 kg (237 lb)   LMP  (LMP Unknown)   BMI 38.25 kg/m²      Physical Exam  Vitals signs and nursing note reviewed.   Constitutional:       Appearance: She is well-developed.   Neck:      Musculoskeletal: Normal range of motion and neck supple.   Cardiovascular:      Rate and Rhythm: Normal rate and regular rhythm.      Heart sounds: Normal heart sounds.   Pulmonary:      Effort: Pulmonary effort is normal.      Breath sounds: Normal breath sounds.   Abdominal:      Palpations: Abdomen is soft.   Genitourinary:     Vagina: Normal.      Comments: Uterus enlarged, c/w 10 wk ga  Musculoskeletal: Normal range of motion.   Skin:     General: Skin is warm and dry.   Neurological:      Mental Status: She is alert and oriented to person, place, and time.      Deep Tendon Reflexes: Reflexes are normal and symmetric.   Psychiatric:         Behavior: Behavior normal.         Thought Content: Thought content normal.         Judgment: Judgment normal.                 Assessment/Plan:       1. Encounter for supervision of high risk pregnancy in first trimester, antepartum    - PREG CNTR PRENATAL PN; Future  - US-OB 2ND 3RD TRI COMPLETE; Future  - URINE DRUG SCREEN W/CONF (AR); Future  - HEP C VIRUS ANTIBODY; Future  - Chlamydia/GC PCR Urine Or Swab; Future    2. Encounter for " supervision of normal pregnancy, antepartum, unspecified     - POCT Urinalysis    3. History of macrosomia in infant in prior pregnancy, currently pregnant    - GLUCOSE 1HR GESTATIONAL; Future    4. Potential exposure to STD-history of STDs       5. Herpes simplex virus type 2 (HSV-2) infection affecting pregnancy in first trimester

## 2020-06-03 LAB
APPEARANCE UR: ABNORMAL
BASOPHILS # BLD AUTO: 0.7 % (ref 0–1.8)
BASOPHILS # BLD: 0.05 K/UL (ref 0–0.12)
BILIRUB UR QL STRIP.AUTO: NEGATIVE
C TRACH DNA SPEC QL NAA+PROBE: POSITIVE
COLOR UR: YELLOW
EOSINOPHIL # BLD AUTO: 0.14 K/UL (ref 0–0.51)
EOSINOPHIL NFR BLD: 1.9 % (ref 0–6.9)
ERYTHROCYTE [DISTWIDTH] IN BLOOD BY AUTOMATED COUNT: 47.3 FL (ref 35.9–50)
GLUCOSE 1H P 50 G GLC PO SERPL-MCNC: 99 MG/DL (ref 70–139)
GLUCOSE UR STRIP.AUTO-MCNC: NEGATIVE MG/DL
HBV SURFACE AG SER QL: ABNORMAL
HCT VFR BLD AUTO: 44.8 % (ref 37–47)
HCV AB SER QL: NORMAL
HGB BLD-MCNC: 14.1 G/DL (ref 12–16)
HIV 1+2 AB+HIV1 P24 AG SERPL QL IA: NORMAL
IMM GRANULOCYTES # BLD AUTO: 0.02 K/UL (ref 0–0.11)
IMM GRANULOCYTES NFR BLD AUTO: 0.3 % (ref 0–0.9)
KETONES UR STRIP.AUTO-MCNC: NEGATIVE MG/DL
LEUKOCYTE ESTERASE UR QL STRIP.AUTO: NEGATIVE
LYMPHOCYTES # BLD AUTO: 1.99 K/UL (ref 1–4.8)
LYMPHOCYTES NFR BLD: 26.4 % (ref 22–41)
MCH RBC QN AUTO: 28.4 PG (ref 27–33)
MCHC RBC AUTO-ENTMCNC: 31.5 G/DL (ref 33.6–35)
MCV RBC AUTO: 90.1 FL (ref 81.4–97.8)
MICRO URNS: ABNORMAL
MONOCYTES # BLD AUTO: 0.3 K/UL (ref 0–0.85)
MONOCYTES NFR BLD AUTO: 4 % (ref 0–13.4)
N GONORRHOEA DNA SPEC QL NAA+PROBE: NEGATIVE
NEUTROPHILS # BLD AUTO: 5.03 K/UL (ref 2–7.15)
NEUTROPHILS NFR BLD: 66.7 % (ref 44–72)
NITRITE UR QL STRIP.AUTO: NEGATIVE
NRBC # BLD AUTO: 0 K/UL
NRBC BLD-RTO: 0 /100 WBC
PH UR STRIP.AUTO: 6 [PH] (ref 5–8)
PLATELET # BLD AUTO: 270 K/UL (ref 164–446)
PMV BLD AUTO: 11.1 FL (ref 9–12.9)
PROT UR QL STRIP: NEGATIVE MG/DL
RBC # BLD AUTO: 4.97 M/UL (ref 4.2–5.4)
RBC UR QL AUTO: NEGATIVE
RPR SER QL: NON REACTIVE
RUBV AB SER QL: 217 IU/ML
SP GR UR STRIP.AUTO: 1.03
SPECIMEN SOURCE: ABNORMAL
TREPONEMA PALLIDUM IGG+IGM AB [PRESENCE] IN SERUM OR PLASMA BY IMMUNOASSAY: REACTIVE
UROBILINOGEN UR STRIP.AUTO-MCNC: 1 MG/DL
WBC # BLD AUTO: 7.5 K/UL (ref 4.8–10.8)

## 2020-06-04 LAB
AMPHET CTO UR CFM-MCNC: NEGATIVE NG/ML
BARBITURATES CTO UR CFM-MCNC: NEGATIVE NG/ML
BENZODIAZ CTO UR CFM-MCNC: NEGATIVE NG/ML
CANNABINOIDS CTO UR CFM-MCNC: POSITIVE NG/ML
COCAINE CTO UR CFM-MCNC: NEGATIVE NG/ML
DRUG COMMENT 753798: NORMAL
METHADONE CTO UR CFM-MCNC: NEGATIVE NG/ML
OPIATES CTO UR CFM-MCNC: NEGATIVE NG/ML
PCP CTO UR CFM-MCNC: NEGATIVE NG/ML
PROPOXYPH CTO UR CFM-MCNC: NEGATIVE NG/ML

## 2020-06-05 PROBLEM — F12.90 MARIJUANA USE: Status: ACTIVE | Noted: 2020-06-05

## 2020-06-06 LAB
T PALLIDUM AB SER QL AGGL: REACTIVE
THC UR CFM-MCNC: 129 NG/ML

## 2020-06-08 ENCOUNTER — TELEPHONE (OUTPATIENT)
Dept: OBGYN | Facility: CLINIC | Age: 25
End: 2020-06-08

## 2020-06-08 DIAGNOSIS — Z86.19 HISTORY OF SYPHILIS: ICD-10-CM

## 2020-06-08 PROBLEM — A74.9 CHLAMYDIA: Status: ACTIVE | Noted: 2020-06-08

## 2020-06-08 RX ORDER — AZITHROMYCIN 500 MG/1
1000 TABLET, FILM COATED ORAL ONCE
Qty: 2 TAB | Refills: 0 | Status: SHIPPED | OUTPATIENT
Start: 2020-06-08 | End: 2020-06-08

## 2020-06-08 NOTE — TELEPHONE ENCOUNTER
"Unable to contact patient. \"the number has been disconnected, changed or no longer in service\" Letter sent.    Message from VINITA Conrad sent at 6/8/2020  8:47 AM PDT -----  Positive for CT, Rx ordered, please inform pt to refrain from sexual activity until partner has been treated as well.    "

## 2020-06-08 NOTE — TELEPHONE ENCOUNTER
"Unable to contact patient nor to leave a VM \"the number has been disconnected, changed, or no longer in service\" Letter sent     Message from VINITA Conrad sent at 6/8/2020  9:02 AM PDT -----  T. Pallidum antibody reactive, Please have patient follow up with RPR titer    "

## 2020-06-25 RX ORDER — AZITHROMYCIN 500 MG/1
1000 TABLET, FILM COATED ORAL ONCE
Qty: 1 TAB | Refills: 0 | Status: SHIPPED | OUTPATIENT
Start: 2020-06-25 | End: 2020-06-25

## 2020-06-30 ENCOUNTER — ROUTINE PRENATAL (OUTPATIENT)
Dept: OBGYN | Facility: CLINIC | Age: 25
End: 2020-06-30

## 2020-06-30 VITALS — DIASTOLIC BLOOD PRESSURE: 70 MMHG | WEIGHT: 238 LBS | BODY MASS INDEX: 38.41 KG/M2 | SYSTOLIC BLOOD PRESSURE: 106 MMHG

## 2020-06-30 DIAGNOSIS — Z86.19 HISTORY OF SYPHILIS: ICD-10-CM

## 2020-06-30 DIAGNOSIS — O09.91 ENCOUNTER FOR SUPERVISION OF HIGH RISK PREGNANCY IN FIRST TRIMESTER, ANTEPARTUM: ICD-10-CM

## 2020-06-30 PROCEDURE — 90040 PR PRENATAL FOLLOW UP: CPT | Performed by: PHYSICIAN ASSISTANT

## 2020-06-30 ASSESSMENT — FIBROSIS 4 INDEX: FIB4 SCORE: 0.32

## 2020-06-30 NOTE — NON-PROVIDER
OB follow up   No fetal movement yet.  No VB, LOF or UC's.  Wt: 238      BP:106/70  Phone # [phone]  C/o cram[ping in the lower abdomen.  Preferred pharmacy confirmed.  AFP ordered.  US on 08/11/2020
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
## 2020-07-29 RX ORDER — AZITHROMYCIN 500 MG/1
1000 TABLET, FILM COATED ORAL ONCE
Qty: 2 TAB | Refills: 0 | Status: SHIPPED | OUTPATIENT
Start: 2020-07-29 | End: 2020-07-29

## 2020-08-11 ENCOUNTER — APPOINTMENT (OUTPATIENT)
Dept: RADIOLOGY | Facility: IMAGING CENTER | Age: 25
End: 2020-08-11
Attending: NURSE PRACTITIONER

## 2020-08-11 DIAGNOSIS — O09.91 ENCOUNTER FOR SUPERVISION OF HIGH RISK PREGNANCY IN FIRST TRIMESTER, ANTEPARTUM: ICD-10-CM

## 2020-08-11 DIAGNOSIS — O44.02 PLACENTA PREVIA ANTEPARTUM IN SECOND TRIMESTER: Primary | ICD-10-CM

## 2020-08-11 PROCEDURE — 76805 OB US >/= 14 WKS SNGL FETUS: CPT | Mod: TC | Performed by: NURSE PRACTITIONER

## 2020-08-11 PROCEDURE — 76817 TRANSVAGINAL US OBSTETRIC: CPT | Mod: TC | Performed by: NURSE PRACTITIONER

## 2020-08-13 ENCOUNTER — ROUTINE PRENATAL (OUTPATIENT)
Dept: OBGYN | Facility: CLINIC | Age: 25
End: 2020-08-13

## 2020-08-13 VITALS — DIASTOLIC BLOOD PRESSURE: 60 MMHG | BODY MASS INDEX: 39.77 KG/M2 | SYSTOLIC BLOOD PRESSURE: 110 MMHG | WEIGHT: 246.4 LBS

## 2020-08-13 DIAGNOSIS — O09.91 ENCOUNTER FOR SUPERVISION OF HIGH RISK PREGNANCY IN FIRST TRIMESTER, ANTEPARTUM: ICD-10-CM

## 2020-08-13 PROCEDURE — 90040 PR PRENATAL FOLLOW UP: CPT | Performed by: PHYSICIAN ASSISTANT

## 2020-08-13 ASSESSMENT — FIBROSIS 4 INDEX: FIB4 SCORE: 0.32
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
## 2020-08-13 NOTE — PROGRESS NOTES
OB follow up   + fetal movement. Active  No VB, LOF or UC's.  Wt:246.4LBS       BP: 110/60  Phone # [phone]  Preferred pharmacy confirmed.  C/o  Cramping

## 2020-08-18 ENCOUNTER — PATIENT MESSAGE (OUTPATIENT)
Dept: OBGYN | Facility: CLINIC | Age: 25
End: 2020-08-18

## 2020-08-21 RX ORDER — AZITHROMYCIN 250 MG/1
TABLET, FILM COATED ORAL
Qty: 4 TAB | Refills: 0 | Status: ON HOLD | OUTPATIENT
Start: 2020-08-21 | End: 2020-12-12

## 2020-08-27 ENCOUNTER — TELEPHONE (OUTPATIENT)
Dept: OBGYN | Facility: CLINIC | Age: 25
End: 2020-08-27

## 2020-08-27 NOTE — TELEPHONE ENCOUNTER
Spoke w pt - already knew about previa, has f/u US already scheduled. She wants to schedule another OB appt.  Transferred to schedulers to make appt.     ----- Message from Erasmo Lopez Ass't sent at 8/27/2020  9:08 AM PDT -----    ----- Message -----  From: VINITA Avalos  Sent: 8/11/2020   2:04 PM PDT  To: [facility]    Placenta previa noted, otherwise WNL.  Please give pt bleeding and previa precautions.  Pelvic rest, no sex.   Repeat TVUS ordered for 4-6wks.  Please have pt schedule.

## 2020-09-09 ENCOUNTER — ROUTINE PRENATAL (OUTPATIENT)
Dept: OBGYN | Facility: CLINIC | Age: 25
End: 2020-09-09

## 2020-09-09 ENCOUNTER — TELEPHONE (OUTPATIENT)
Dept: OBGYN | Facility: CLINIC | Age: 25
End: 2020-09-09

## 2020-09-09 VITALS — DIASTOLIC BLOOD PRESSURE: 64 MMHG | SYSTOLIC BLOOD PRESSURE: 120 MMHG | BODY MASS INDEX: 41.16 KG/M2 | WEIGHT: 255 LBS

## 2020-09-09 DIAGNOSIS — O09.899 SUPERVISION OF OTHER HIGH RISK PREGNANCY, ANTEPARTUM: Primary | ICD-10-CM

## 2020-09-09 PROCEDURE — 90040 PR PRENATAL FOLLOW UP: CPT | Performed by: NURSE PRACTITIONER

## 2020-09-09 ASSESSMENT — FIBROSIS 4 INDEX: FIB4 SCORE: 0.32
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
## 2020-09-09 NOTE — TELEPHONE ENCOUNTER
----- Message from Sharyn Mejias sent at 9/5/2020 12:26 PM PDT -----  Regarding: Non-Urgent Medical Question  Contact: [phone]  Ismael guzman I've been waking up with really bad headaches and feeling very nauseous I don't know if it's something to worry about?     9/9/20 1411 Spoke with pt, states she has been taking Tylenol and helps for a bit, drinking about 1 gallon of water. Pt also c/o painful irregular UC. Pt has appt today at 1600. Consulted with Al[place]a [place] CNM and stated she will discuss this further with the pt, but if she is in a lot of pain, she can go to L&D for further evaluation. Pt notified of above and will keep appt

## 2020-09-09 NOTE — PROGRESS NOTES
Pt here today for OB follow up  Pt states for thw past 2 days baby is not moving as much  WT: 255 lb  BP: 120/64  Preferred pharmacy verified with pt.  3rd trimester labs ordered today, pt given instructions.   Pt states has feet swelling, hands are hurting when waking up, headaches every day for the past week and nausea off an on. States no other complaints.   Good # [phone]

## 2020-09-11 ENCOUNTER — HOSPITAL ENCOUNTER (OUTPATIENT)
Facility: MEDICAL CENTER | Age: 25
End: 2020-09-11
Attending: OBSTETRICS & GYNECOLOGY | Admitting: OBSTETRICS & GYNECOLOGY
Payer: MEDICAID

## 2020-09-11 VITALS
SYSTOLIC BLOOD PRESSURE: 118 MMHG | HEIGHT: 66 IN | RESPIRATION RATE: 18 BRPM | BODY MASS INDEX: 40.98 KG/M2 | TEMPERATURE: 98 F | OXYGEN SATURATION: 97 % | DIASTOLIC BLOOD PRESSURE: 74 MMHG | WEIGHT: 255 LBS | HEART RATE: 100 BPM

## 2020-09-11 LAB
APPEARANCE UR: ABNORMAL
COLOR UR AUTO: YELLOW
GLUCOSE UR QL STRIP.AUTO: NEGATIVE MG/DL
KETONES UR QL STRIP.AUTO: NEGATIVE MG/DL
LEUKOCYTE ESTERASE UR QL STRIP.AUTO: ABNORMAL
NITRITE UR QL STRIP.AUTO: NEGATIVE
PH UR STRIP.AUTO: 5.5 [PH] (ref 5–8)
PROT UR QL STRIP: NEGATIVE MG/DL
RBC UR QL AUTO: NEGATIVE
SP GR UR STRIP.AUTO: 1.02 (ref 1–1.03)

## 2020-09-11 PROCEDURE — 87491 CHLMYD TRACH DNA AMP PROBE: CPT

## 2020-09-11 PROCEDURE — 99213 OFFICE O/P EST LOW 20 MIN: CPT | Performed by: NURSE PRACTITIONER

## 2020-09-11 PROCEDURE — 81002 URINALYSIS NONAUTO W/O SCOPE: CPT

## 2020-09-11 PROCEDURE — 87591 N.GONORRHOEAE DNA AMP PROB: CPT

## 2020-09-11 RX ORDER — DOCUSATE SODIUM 100 MG/1
100 CAPSULE, LIQUID FILLED ORAL 2 TIMES DAILY PRN
Status: CANCELLED | OUTPATIENT
Start: 2020-09-11

## 2020-09-11 RX ORDER — HYDROCODONE BITARTRATE AND ACETAMINOPHEN 5; 325 MG/1; MG/1
1 TABLET ORAL EVERY 4 HOURS PRN
Status: CANCELLED | OUTPATIENT
Start: 2020-09-11

## 2020-09-11 RX ORDER — MISOPROSTOL 200 UG/1
600 TABLET ORAL
Status: CANCELLED | OUTPATIENT
Start: 2020-09-11

## 2020-09-11 RX ORDER — VITAMIN A ACETATE, BETA CAROTENE, ASCORBIC ACID, CHOLECALCIFEROL, .ALPHA.-TOCOPHEROL ACETATE, DL-, THIAMINE MONONITRATE, RIBOFLAVIN, NIACINAMIDE, PYRIDOXINE HYDROCHLORIDE, FOLIC ACID, CYANOCOBALAMIN, CALCIUM CARBONATE, FERROUS FUMARATE, ZINC OXIDE, CUPRIC OXIDE 3080; 12; 120; 400; 1; 1.84; 3; 20; 22; 920; 25; 200; 27; 10; 2 [IU]/1; UG/1; MG/1; [IU]/1; MG/1; MG/1; MG/1; MG/1; MG/1; [IU]/1; MG/1; MG/1; MG/1; MG/1; MG/1
1 TABLET, FILM COATED ORAL
Status: CANCELLED | OUTPATIENT
Start: 2020-09-12

## 2020-09-11 RX ORDER — IBUPROFEN 600 MG/1
600 TABLET ORAL EVERY 6 HOURS PRN
Status: CANCELLED | OUTPATIENT
Start: 2020-09-11

## 2020-09-11 RX ORDER — CARBOPROST TROMETHAMINE 250 UG/ML
250 INJECTION, SOLUTION INTRAMUSCULAR
Status: CANCELLED | OUTPATIENT
Start: 2020-09-11

## 2020-09-11 RX ORDER — METOCLOPRAMIDE HYDROCHLORIDE 5 MG/ML
10 INJECTION INTRAMUSCULAR; INTRAVENOUS EVERY 6 HOURS PRN
Status: CANCELLED | OUTPATIENT
Start: 2020-09-11

## 2020-09-11 RX ORDER — SODIUM CHLORIDE, SODIUM LACTATE, POTASSIUM CHLORIDE, CALCIUM CHLORIDE 600; 310; 30; 20 MG/100ML; MG/100ML; MG/100ML; MG/100ML
INJECTION, SOLUTION INTRAVENOUS PRN
Status: CANCELLED | OUTPATIENT
Start: 2020-09-11

## 2020-09-11 RX ORDER — HYDROCODONE BITARTRATE AND ACETAMINOPHEN 5; 325 MG/1; MG/1
2 TABLET ORAL EVERY 4 HOURS PRN
Status: CANCELLED | OUTPATIENT
Start: 2020-09-11

## 2020-09-11 RX ORDER — SIMETHICONE 80 MG
80 TABLET,CHEWABLE ORAL 4 TIMES DAILY PRN
Status: CANCELLED | OUTPATIENT
Start: 2020-09-11

## 2020-09-11 ASSESSMENT — PAIN SCALES - GENERAL: PAINLEVEL: 3

## 2020-09-11 ASSESSMENT — FIBROSIS 4 INDEX: FIB4 SCORE: 0.32

## 2020-09-12 NOTE — NON-PROVIDER
Duplicate note       [address] [facility]   [place] [address]       Sharyn Dennison   MRN: [id number], : 1995 , Sex: F   Admit: 2020, D/C:           Cindy Granda, Student   Medical Student      ED Triage Notes   Cosign Needed   Date of Service:  2020  7:42 PM                   PATIENT ID:    NAME:             Sharyn Dennison  MRN:               [id number]  YOB: 1995     CC:  Dizziness and low blood pressure     HPI:  Sharyn Dennison is a 24 y.o. female  at 24w6d by US. Patient presents complaining of no uterine contractions, with no loss of fluid. Positive fetal movement.  No vaginal bleeding.  She came in today due to having an episode of dizziness upon wakening. She states that she checked her blood pressure and it was 110/59 and her grandmother told her that was low and she should be seen. Pregnancy has been complicated by partial placenta previa chlamydia infection, treated 20 then treated again 20 due to patient having intercourse right after first treatment. Also reports recurrent headaches that are relieved by tylenol. Also states her arm fell asleep yesterday while she was watching television. Reports constant mild low back pain.     ROS: Patient denies any fever chills, nausea, vomiting, chest pain, shortness of breath, or dysuria or unusual swelling of hands or feet.      Prenatal Care: Obtained at [facility]     Prenatal Labs:   HepBsAg: NR HIV: NR Rubella: immune   RPR: reactive (Hx of syphilis, treated)   GBS: unknown   GC/CT: positive A pos/ Ab neg Quad Screen: not done   No results for input(s): WBC, RBC, HEMOGLOBIN, HEMATOCRIT, MCV, MCH, RDW, PLATELETCT, MPV, NEUTSPOLYS, LYMPHOCYTES, MONOCYTES, EOSINOPHILS, BASOPHILS, RBCMORPHOLO in the last 72 hours.  No results for input(s): SODIUM, POTASSIUM, CHLORIDE, CO2, GLUCOSE, BUN, CPKTOTAL in the last 72 hours.        IMAGINwk OB ultrasound:   2020 10:07 AM     HISTORY/REASON  FOR EXAM:  Evaluate fetal anatomy     TECHNIQUE/EXAM DESCRIPTION: OB complete ultrasound.  Transabdominal and transvaginal scanning were performed.     COMPARISON:  None     FINDINGS:  Fetal Lie:  Vertex  LMP:  Unknown  Working KIRA:  2020     Placenta (Location):  Posterior  Placenta Previa: Partial  Placental Grade: I     Amniotic Fluid Volume:  TYREL = 15.55 cm     Fetal Heart Rate:  164 bpm     Cervical Length:  4.75 cm     No maternal adnexal mass is identified.     Umbilical Artery S/D Ratio(s):  Not applicable     Fetal Anatomy  (Seen or Not Seen)  Lateral Ventricles     Seen  Cisterna Magna        Seen  Cerebellum              Seen  CSP             Seen  Orbits             Seen  Face/Lips                Seen  Cord Insertion         Seen  Placental CI         Seen  4 Chamber Heart     Seen  LVOT               Seen  RVOT              Seen  3 Vessel View     Seen  Stomach       Seen  Kidneys                   Seen  Urinary Bladder      Seen  Spine                       Seen  3 Vessel Cord          Seen  Both Upper Extremities    Seen  Both Lower Extremities    Seen  Diaphragm             Seen  Movement       Seen  Gender:  Likely female     Fetal Biometry  BPD    4.72 cm, 20 weeks, 2 days  HC    17.60 cm, 20 weeks, 1 day  AC    15.41 cm, 20 weeks, 4 days  Femur Length    3.37 cm, 20 weeks, 4 days  Humerus Length    3.17 cm, 20 weeks, 4 days  Cerebellum Diameter   2.20 cm     EGA by this US:  20 weeks, 3 days  KIRA by this US: 2020  KIRA by 1st US:  2020 - ER     Estimated Fetal Weight:  360 grams  EFW Percentile: 49.9%     Comments:     IMPRESSION:     1.  Single intrauterine pregnancy of an estimated gestational age of 20 weeks, 3 days with an estimated date of delivery of 2020.  2.  There is a partial posterior placenta previa.  3.  Fetal survey within normal limits         POB Hx:                    OB History    Para Term  AB Living   5 4 4     4   SAB TAB Ectopic Molar  "Multiple Live Births            0 4       # Outcome Date GA Lbr Chad/2nd Weight Sex Delivery Anes PTL Lv   5 Current                     4 Term 10/20/18 39w2d   4.015 kg (8 lb 13.6 oz) F Vag-Spont EPI N SANCHEZ   3 Term 14 39w0d   3.856 kg (8 lb 8 oz) F Vag-Spont     SANCHEZ   2 Term 12 41w2d   3.544 kg (7 lb 13 oz) M Vag-Spont EPI   SANCHEZ      Birth Comments: baby was not breathing when born    1 Term 07/01/10 40w6d 04:00 4.026 kg (8 lb 14 oz) F Vag-Spont None N SANCHEZ      Birth Comments: no complications         PMH/Problem List:    Past Medical History        Past Medical History:   Diagnosis Date   • Depression      postpartum depression   • Ovarian cyst 13             Patient Active Problem List     Diagnosis Date Noted   • Placenta previa antepartum in second trimester - F/u US  [  ]  2020   • History of syphilis - tx 2017 per pt 2020   • Chlamydia - tx 20 - will need JUANITA on or after  [  ] 2020   • Marijuana use 2020   • Supervision of other high risk pregnancy, antepartum 2020   • History of macrosomia x 2 - 8lb 14oz, 8lb 13.6oz 2020   • Potential exposure to STD-history of STDs  2020   • Herpes simplex virus type 2 (HSV-2) infection affecting pregnancy in first trimester 2020         Current Outpatient Medications:  No current facility-administered medications on file prior to encounter.       No current outpatient medications on file prior to encounter.         PSH:    Past Surgical History   No past surgical history on file.        Allergies:        Allergies   Allergen Reactions   • Nkda [No Known Drug Allergy]              PHYSICAL EXAM:  Vitals       Vitals:     20 1906   BP: 118/74   Pulse: 100   Resp: 18   Temp: 36.7 °C (98 °F)   TempSrc: Temporal   SpO2: 97%   Weight: 115.7 kg (255 lb)   Height: 1.676 m (5' 6\")        Temp (24hrs), Av.7 °C (98 °F), Min:36.7 °C (98 °F), Max:36.7 °C (98 °F)     General: No acute distress, " resting comfortably in bed.  HEENT: normocephalic, nontraumatic, PERRLA, EOMI  Cardiovascular: Heart RRR with no murmurs, rubs or gallops. Distal Pulses 2+  Respiratory: symmetric chest expansion, lungs CTA bilaterally with no wheezes rales or  rhonci  Abdomen: gravid, nontender  Musculoskeletal: strength 5/5 in four extremities  Neuro: non focal with no numbness, tingling or changes in sensation     SVE: deferred  Doppler:155      A  1. 23 yo  with IUP 24w6d  2. Dizziness  3. Headache  4. Back pain  5. Chlamydial infection in pregnancy     P  - Discussed dizziness common in pregnancy and may be related to quick changes in position. Encouraged slow rising after resting, sit on edge of bed for a few minutes before getting up.  - Education provided to continue tylenol for HA relief and may try caffeine as well. Educated that BP of 110/55 is wnl. Urine protein today is wnl. Not concerned for preeclampsia  - Discussed rest and use of pregnancy belt to ease back pain especially when caring for and picking up toddler.  - JUANITA for chlamydia sent

## 2020-09-12 NOTE — ED TRIAGE NOTES
PATIENT ID:  NAME:  Sharyn Dennison  MRN:               [id number]  YOB: 1995    CC:  Dizziness and low blood pressure    HPI:  Sharyn Dennison is a 24 y.o. female  at 24w6d by US. Patient presents complaining of no uterine contractions, with no loss of fluid. Positive fetal movement.  No vaginal bleeding.  She came in today due to having an episode of dizziness upon wakening. She states that she checked her blood pressure and it was 110/59 and her grandmother told her that was low and she should be seen. Pregnancy has been complicated by partial placenta previa chlamydia infection, treated 20 then treated again 20 due to patient having intercourse right after first treatment. Also reports recurrent headaches that are relieved by tylenol. Also states her arm fell asleep yesterday while she was watching television. Reports constant mild low back pain.    ROS: Patient denies any fever chills, nausea, vomiting, chest pain, shortness of breath, or dysuria or unusual swelling of hands or feet.     Prenatal Care: Obtained at [facility]    Prenatal Labs:   HepBsAg: NR HIV: NR Rubella: immune   RPR: reactive (Hx of syphilis, treated)  GBS: unknown   GC/CT: positive A pos/ Ab neg Quad Screen: not done   No results for input(s): WBC, RBC, HEMOGLOBIN, HEMATOCRIT, MCV, MCH, RDW, PLATELETCT, MPV, NEUTSPOLYS, LYMPHOCYTES, MONOCYTES, EOSINOPHILS, BASOPHILS, RBCMORPHOLO in the last 72 hours.  No results for input(s): SODIUM, POTASSIUM, CHLORIDE, CO2, GLUCOSE, BUN, CPKTOTAL in the last 72 hours.       IMAGINwk OB ultrasound:   2020 10:07 AM     HISTORY/REASON FOR EXAM:  Evaluate fetal anatomy     TECHNIQUE/EXAM DESCRIPTION: OB complete ultrasound.  Transabdominal and transvaginal scanning were performed.     COMPARISON:  None     FINDINGS:  Fetal Lie:  Vertex  LMP:  Unknown  Working KIRA:  2020     Placenta (Location):  Posterior  Placenta Previa: Partial  Placental Grade:  I     Amniotic Fluid Volume:  TYREL = 15.55 cm     Fetal Heart Rate:  164 bpm     Cervical Length:  4.75 cm     No maternal adnexal mass is identified.     Umbilical Artery S/D Ratio(s):  Not applicable     Fetal Anatomy  (Seen or Not Seen)  Lateral Ventricles     Seen  Cisterna Magna        Seen  Cerebellum              Seen  CSP             Seen  Orbits             Seen  Face/Lips                Seen  Cord Insertion         Seen  Placental CI         Seen  4 Chamber Heart     Seen  LVOT               Seen  RVOT              Seen  3 Vessel View     Seen  Stomach       Seen  Kidneys                   Seen  Urinary Bladder      Seen  Spine                       Seen  3 Vessel Cord          Seen  Both Upper Extremities    Seen  Both Lower Extremities    Seen  Diaphragm             Seen  Movement       Seen  Gender:  Likely female     Fetal Biometry  BPD    4.72 cm, 20 weeks, 2 days  HC    17.60 cm, 20 weeks, 1 day  AC    15.41 cm, 20 weeks, 4 days  Femur Length    3.37 cm, 20 weeks, 4 days  Humerus Length    3.17 cm, 20 weeks, 4 days  Cerebellum Diameter   2.20 cm     EGA by this US:  20 weeks, 3 days  KIRA by this US: 2020  KIRA by 1st US:  2020 - ER     Estimated Fetal Weight:  360 grams  EFW Percentile: 49.9%     Comments:     IMPRESSION:     1.  Single intrauterine pregnancy of an estimated gestational age of 20 weeks, 3 days with an estimated date of delivery of 2020.  2.  There is a partial posterior placenta previa.  3.  Fetal survey within normal limits       POB Hx:  OB History    Para Term  AB Living   5 4 4     4   SAB TAB Ectopic Molar Multiple Live Births           0 4      # Outcome Date GA Lbr Chad/2nd Weight Sex Delivery Anes PTL Lv   5 Current            4 Term 10/20/18 39w2d  4.015 kg (8 lb 13.6 oz) F Vag-Spont EPI N SANCHEZ   3 Term 14 39w0d  3.856 kg (8 lb 8 oz) F Vag-Spont   SANCHEZ   2 Term 12 41w2d  3.544 kg (7 lb 13 oz) M Vag-Spont EPI  SANCHEZ      Birth Comments:  "baby was not breathing when born    1 Term 07/01/10 40w6d 04:00 4.026 kg (8 lb 14 oz) F Vag-Spont None N SANCHEZ      Birth Comments: no complications       PMH/Problem List:    Past Medical History:   Diagnosis Date   • Depression     postpartum depression   • Ovarian cyst 13     Patient Active Problem List    Diagnosis Date Noted   • Placenta previa antepartum in second trimester - F/u US  [  ]  2020   • History of syphilis - tx 2017 per pt 2020   • Chlamydia - tx 20 - will need JUANITA on or after  [  ] 2020   • Marijuana use 2020   • Supervision of other high risk pregnancy, antepartum 2020   • History of macrosomia x 2 - 8lb 14oz, 8lb 13.6oz 2020   • Potential exposure to STD-history of STDs  2020   • Herpes simplex virus type 2 (HSV-2) infection affecting pregnancy in first trimester 2020       Current Outpatient Medications:  No current facility-administered medications on file prior to encounter.      No current outpatient medications on file prior to encounter.       PSH:    No past surgical history on file.    Allergies:   Allergies   Allergen Reactions   • Nkda [No Known Drug Allergy]          PHYSICAL EXAM:  Vitals:    20 1906   BP: 118/74   Pulse: 100   Resp: 18   Temp: 36.7 °C (98 °F)   TempSrc: Temporal   SpO2: 97%   Weight: 115.7 kg (255 lb)   Height: 1.676 m (5' 6\")     Temp (24hrs), Av.7 °C (98 °F), Min:36.7 °C (98 °F), Max:36.7 °C (98 °F)    General: No acute distress, resting comfortably in bed.  HEENT: normocephalic, nontraumatic, PERRLA, EOMI  Cardiovascular: Heart RRR with no murmurs, rubs or gallops. Distal Pulses 2+  Respiratory: symmetric chest expansion, lungs CTA bilaterally with no wheezes rales or  rhonci  Abdomen: gravid, nontender  Musculoskeletal: strength 5/5 in four extremities  Neuro: non focal with no numbness, tingling or changes in sensation    SVE: deferred  Doppler:155     A  1. 23 yo  with IUP " 24w6d  2. Dizziness  3. Headache  4. Back pain  5. Chlamydial infection in pregnancy    P  - Discussed dizziness common in pregnancy and may be related to quick changes in position. Encouraged slow rising after resting, sit on edge of bed for a few minutes before getting up.  - Education provided to continue tylenol for HA relief and may try caffeine as well. Educated that BP of 110/55 is wnl. Urine protein today is wnl. Not concerned for preeclampsia  - Discussed rest and use of pregnancy belt to ease back pain especially when caring for and picking up toddler.  - JUANITA for chlamydia sent

## 2020-09-12 NOTE — PROGRESS NOTES
- pt is a , 24.6 weeks gestation IUP, with c/o intermittent HA relieved by tylenol since Wednesday, dizziness that caused her to check her /59. No c/o lof, bleeding or dfm. Pt states she drink at least a gallon of water a day. FHTs doppler with efm 155's, toco placed, vss.   - Daina WELCH and Cindy BOSS given report, will come to see pt.  - Cindy BOSS at bedside discussing poc. Received orders from Daina WELCH for gc/chlamydia urine to be sent, then discharge home.  - addressed discharge instructions with PTL labor precautions, OTC tylenol of headache with small amounts of caffiene, indoors due to smoke, all questions answered. Left off floor stable with mother at side.

## 2020-09-21 ENCOUNTER — HOSPITAL ENCOUNTER (OUTPATIENT)
Dept: LAB | Facility: MEDICAL CENTER | Age: 25
End: 2020-09-21
Attending: NURSE PRACTITIONER
Payer: COMMERCIAL

## 2020-09-21 DIAGNOSIS — O09.899 SUPERVISION OF OTHER HIGH RISK PREGNANCY, ANTEPARTUM: ICD-10-CM

## 2020-09-21 LAB
ERYTHROCYTE [DISTWIDTH] IN BLOOD BY AUTOMATED COUNT: 44.1 FL (ref 35.9–50)
GLUCOSE 1H P 50 G GLC PO SERPL-MCNC: 102 MG/DL (ref 70–139)
HCT VFR BLD AUTO: 38.6 % (ref 37–47)
HGB BLD-MCNC: 12.3 G/DL (ref 12–16)
MCH RBC QN AUTO: 29.5 PG (ref 27–33)
MCHC RBC AUTO-ENTMCNC: 31.9 G/DL (ref 33.6–35)
MCV RBC AUTO: 92.6 FL (ref 81.4–97.8)
PLATELET # BLD AUTO: 309 K/UL (ref 164–446)
PMV BLD AUTO: 12 FL (ref 9–12.9)
RBC # BLD AUTO: 4.17 M/UL (ref 4.2–5.4)
WBC # BLD AUTO: 10.2 K/UL (ref 4.8–10.8)

## 2020-09-22 DIAGNOSIS — Z86.19 HISTORY OF SYPHILIS: ICD-10-CM

## 2020-09-22 LAB
RPR SER QL: NON REACTIVE
TREPONEMA PALLIDUM IGG+IGM AB [PRESENCE] IN SERUM OR PLASMA BY IMMUNOASSAY: REACTIVE

## 2020-09-24 LAB — T PALLIDUM AB SER QL AGGL: REACTIVE

## 2020-09-28 ENCOUNTER — TELEPHONE (OUTPATIENT)
Dept: OBGYN | Facility: CLINIC | Age: 25
End: 2020-09-28

## 2020-09-28 DIAGNOSIS — O99.713 PRURITUS GRAVIDARUM, THIRD TRIMESTER: ICD-10-CM

## 2020-09-28 DIAGNOSIS — L29.9 PRURITUS GRAVIDARUM, THIRD TRIMESTER: ICD-10-CM

## 2020-09-28 NOTE — TELEPHONE ENCOUNTER
After consulting with Dr. Wang I contacted pt. this morning and spoke with her regarding her lab results for RPR syphilis. Pt had sent a [employer] message last week regarding Treponema Pallidum. Almita zeng's message requested RPR titers if labs did not run them. I checked with Dr. Wang and RPR titers on 09/21/2020 were Non reactive there fore no need to run titers again. Pt informed and instructed to disregard the message I sent her through [employer] this morning.  Before finishing the telephone call pt. mentioned that yesterday she started having itchiness all over her body and it worsen at night, states she can feel the itchiness a lot on the palms of her hands. Pt stated she has Hx of Cholestasis in pregnancy and thinks she has it again with this pregnancy. I consulted with Dr. Wang and he will be ordering bile acids labs for pt. Pt informed. Pt had no further questions or concerns.

## 2020-10-08 ENCOUNTER — ROUTINE PRENATAL (OUTPATIENT)
Dept: OBGYN | Facility: CLINIC | Age: 25
End: 2020-10-08

## 2020-10-08 VITALS — DIASTOLIC BLOOD PRESSURE: 60 MMHG | SYSTOLIC BLOOD PRESSURE: 110 MMHG | WEIGHT: 264.8 LBS | BODY MASS INDEX: 42.74 KG/M2

## 2020-10-08 DIAGNOSIS — O09.899 SUPERVISION OF OTHER HIGH RISK PREGNANCY, ANTEPARTUM: Primary | ICD-10-CM

## 2020-10-08 DIAGNOSIS — O36.8130 DECREASED FETAL MOVEMENTS IN THIRD TRIMESTER, SINGLE OR UNSPECIFIED FETUS: ICD-10-CM

## 2020-10-08 PROCEDURE — 90471 IMMUNIZATION ADMIN: CPT | Performed by: NURSE PRACTITIONER

## 2020-10-08 PROCEDURE — 90715 TDAP VACCINE 7 YRS/> IM: CPT | Performed by: NURSE PRACTITIONER

## 2020-10-08 PROCEDURE — 59025 FETAL NON-STRESS TEST: CPT | Performed by: NURSE PRACTITIONER

## 2020-10-08 PROCEDURE — 90040 PR PRENATAL FOLLOW UP: CPT | Performed by: NURSE PRACTITIONER

## 2020-10-08 ASSESSMENT — FIBROSIS 4 INDEX: FIB4 SCORE: 0.28

## 2020-10-08 NOTE — PROGRESS NOTES
OB follow up   + fetal movement. Active  No VB, LOF or UC's.  Flu vaccine offered Declined  Phone #  [phone]  Preferred pharmacy confirmed.  C/o  Cramping here and there    NDC: 59915-069-17  LOT#: [id number]  Expiration Date: 2022  Dose: 0.5ml  Site: Left Deltoid  Patient educated on use and side effects of medication. Name and  verified prior to injection. Pt tolerated? Well   Verified by [facility]  Administered by Therese Nelson, Med Ass't at 3:25 PM.  Patient Provided Medication: no

## 2020-10-08 NOTE — PROGRESS NOTES
S) Pt is a 24 y.o.   at 28w5d  gestation. Routine prenatal care today. Complains of less fetal movement than she expected. She states that all of her other kids moved a lot, and this one doesn't move as much. She uses a doppler at home to check, but would like monitoring today. Flu offered and declined. Desires Tdap. FKC sheet given, BTL form signed. Has not done bile acids yet, will do this week. Has follow up US scheduled for 10/19/20 to recheck placenta.  labor precautions reviewed, all questions answered.    Fetal movement Normal- less than she expects  Cramping no  VB no  LOF no   Denies dysuria. Generally feels well today. Good self-care activities identified. Denies headaches, swelling, visual changes, or epigastric pain .     O) /60   Wt 120.1 kg (264 lb 12.8 oz)         Labs:       PNL: WNL       GCT: 102        AFP: Not Examined       GBS: N/A       Pertinent ultrasound -        20- Survey WNL, TYREL 15.55cm, c/w prev dating. Partial placenta previa noted at this time. Needs repeat check    Has follow up scheduled for 10/19/20    A) IUP at 28w5d       S=D    NST today for Dec FM- baseline 125bpm, mod variability, pos accels, neg decels  TOCO- quiet    Discussed FKC sheet, increased hydration and small frequent meals. Return of dec FM again.         Patient Active Problem List    Diagnosis Date Noted   • Placenta previa antepartum in second trimester - F/u US  [  ]  2020   • History of syphilis - tx 2017 per pt 2020   • Chlamydia - tx 20 - will need JUANITA on or after  [  ] 2020   • Marijuana use 2020   • Supervision of other high risk pregnancy, antepartum 2020   • History of macrosomia x 2 - 8lb 14oz, 8lb 13.6oz 2020   • Potential exposure to STD-history of STDs  2020   • Herpes simplex virus type 2 (HSV-2) infection affecting pregnancy in first trimester 2020          SVE: defered       Chaperone offered: n/a         TDAP:  yes       FLU: no        BTL: yes       : n/a       C/S Consent: n/a       IOL or C/S scheduled: no       LAST PAP: 20- negative         P) s/s ptl vs general discomforts. Fetal movements reviewed. General ed and anticipatory guidance. Nutrition/exercise/vitamin. Plans breast Plans pp contraception- BTL if c/s.  Continue PNV.

## 2020-10-08 NOTE — LETTER
"Count Your Baby's Movements  Another step to a healthy delivery    Sharyn Peguero             Dept: [phone]    How Many Weeks Pregnant? 28w5d    Date to Begin Counting: 10/08/2020              How to use this chart    One way for your physician to keep track of your baby's health is by knowing how often the baby moves (or \"kicks\") in your womb.  You can help your physician to do this by using this chart every day.    Every day, you should see how many hours it takes for your baby to move 10 times.  Start in the morning, as soon as you get up.    · First, write down the time your baby moves until you get to 10.  · Check off one box every time your baby moves until you get to 10.  · Write down the time you finished counting in the last column.  · Total how long it took to count up all 10 movements.  · Finally, fill in the box that shows how long this took.  After counting 10 movements, you no longer have to count any more that day.  The next morning, just start counting again as soon as you get up.    What should you call a \"movement\"?  It is hard to say, because it will feel different from one mother to another and from one pregnancy to the next.  The important thing is that you count the movements the same way throughout your pregnancy.  If you have more questions, you should ask your physician.    Count carefully every day!  SAMPLE:  Week 28    How many hours did it take to feel 10 movements?       Start  Time     1     2     3     4     5     6     7     8     9     10   Finish Time   Mon 8:20 ·  ·  ·  ·  ·  ·  ·  ·  ·  ·  11:40   Tue Wed Thu Fri               Sat               Sun                 IMPORTANT: You should contact your physician if it takes more than two hours for you to feel 10 movements.  Each morning, write down the time and start to count the movements of your baby.  Keep track by checking off one box every time you feel one movement.  When you have " "felt 10 \"kicks\", write down the time you finished counting in the last column.  Then fill in the   box (over the check ana maria) for the number of hours it took.  Be sure to read the complete instructions on the previous page.            "

## 2020-10-09 ENCOUNTER — HOSPITAL ENCOUNTER (OUTPATIENT)
Dept: LAB | Facility: MEDICAL CENTER | Age: 25
End: 2020-10-09
Attending: OBSTETRICS & GYNECOLOGY
Payer: COMMERCIAL

## 2020-10-09 DIAGNOSIS — O99.713 PRURITUS GRAVIDARUM, THIRD TRIMESTER: ICD-10-CM

## 2020-10-09 DIAGNOSIS — L29.9 PRURITUS GRAVIDARUM, THIRD TRIMESTER: ICD-10-CM

## 2020-10-12 LAB — BILE AC SERPL-SCNC: 4 UMOL/L (ref 0–10)

## 2020-10-19 ENCOUNTER — TELEPHONE (OUTPATIENT)
Dept: OBGYN | Facility: CLINIC | Age: 25
End: 2020-10-19

## 2020-10-19 ENCOUNTER — APPOINTMENT (OUTPATIENT)
Dept: RADIOLOGY | Facility: IMAGING CENTER | Age: 25
End: 2020-10-19
Attending: NURSE PRACTITIONER

## 2020-10-19 DIAGNOSIS — O44.02 PLACENTA PREVIA ANTEPARTUM IN SECOND TRIMESTER: ICD-10-CM

## 2020-10-19 PROCEDURE — 76817 TRANSVAGINAL US OBSTETRIC: CPT | Mod: TC | Performed by: NURSE PRACTITIONER

## 2020-10-19 NOTE — TELEPHONE ENCOUNTER
----- Message from VINITA Avalos sent at 10/19/2020  1:40 PM PDT -----  Please let pt know her previa has resolved. She can D/C previa precautions.      10/19/2020 1613 Left message for pt to call back regarding US results.   10/21/2020 1026 pt notified as above. Advised pt to just follow normal pregnancy restrictions such not lifting anything >20lb. Pt agreed and verbalized understanding.

## 2020-10-23 ENCOUNTER — ROUTINE PRENATAL (OUTPATIENT)
Dept: OBGYN | Facility: CLINIC | Age: 25
End: 2020-10-23

## 2020-10-23 VITALS — BODY MASS INDEX: 43.42 KG/M2 | SYSTOLIC BLOOD PRESSURE: 108 MMHG | DIASTOLIC BLOOD PRESSURE: 70 MMHG | WEIGHT: 269 LBS

## 2020-10-23 DIAGNOSIS — L29.9 ITCHING: ICD-10-CM

## 2020-10-23 DIAGNOSIS — O09.899 SUPERVISION OF OTHER HIGH RISK PREGNANCY, ANTEPARTUM: ICD-10-CM

## 2020-10-23 PROBLEM — A74.9 CHLAMYDIA: Status: RESOLVED | Noted: 2020-06-08 | Resolved: 2020-10-23

## 2020-10-23 PROCEDURE — 90040 PR PRENATAL FOLLOW UP: CPT | Performed by: PHYSICIAN ASSISTANT

## 2020-10-23 ASSESSMENT — FIBROSIS 4 INDEX: FIB4 SCORE: 0.28

## 2020-10-23 NOTE — PROGRESS NOTES
Pt has no complaints with cramping, UCs, VB, LOF, though pt has had mild cramping, camilla with pressure over past week. PTL precautions stressed today. Declines flu vaccine. +FM. Pt has had vag itching, and continued full body itching, though bile acids wnl. Pt denies rash, new lotions or soaps, and states the itching is worse at night. Has tried OTC itch creams with little relief. Pt to try benadryl PO at night, and will retest bile acids 1 month after the last - pt given lab slip and instructed to do 11/5 or after. Wet mt: +Yeast. [place] 7 recommended today. Call if worsening.     Pt also becomes tearful when talking about MARIA DE JESUS, who was deported to [place] 1 month ago. Pt states she has good support and wasn't expecting him to be a source of good support to the baby, but is still sad as she was hoping he would be there. Pt to keep talking to friends and family and call if moods worsening. Also, US wnl for previa - pt notified of results and no precautions now. RTC 2 wk or sooner prn.

## 2020-10-23 NOTE — NON-PROVIDER
OB follow up   + fetal movement.  No VB, LOF or UC's.  Flu vaccine offered, already declined.  Phone # [phone]  Preferred pharmacy confirmed.  C/o some cramping

## 2020-11-02 ENCOUNTER — ROUTINE PRENATAL (OUTPATIENT)
Dept: OBGYN | Facility: CLINIC | Age: 25
End: 2020-11-02

## 2020-11-02 VITALS — SYSTOLIC BLOOD PRESSURE: 114 MMHG | DIASTOLIC BLOOD PRESSURE: 80 MMHG | WEIGHT: 276 LBS | BODY MASS INDEX: 44.55 KG/M2

## 2020-11-02 DIAGNOSIS — O36.8130 DECREASED FETAL MOVEMENTS IN THIRD TRIMESTER, SINGLE OR UNSPECIFIED FETUS: ICD-10-CM

## 2020-11-02 PROCEDURE — 59025 FETAL NON-STRESS TEST: CPT | Performed by: PHYSICIAN ASSISTANT

## 2020-11-02 PROCEDURE — 90040 PR PRENATAL FOLLOW UP: CPT | Performed by: PHYSICIAN ASSISTANT

## 2020-11-02 ASSESSMENT — FIBROSIS 4 INDEX: FIB4 SCORE: 0.28

## 2020-11-02 NOTE — PROGRESS NOTES
Pt has no complaints with cramping, UCs, VB, LOF. Pt has only felt 2 FM in past day, and not feeling movements regularly. Pt tries eating ice and drinking cold water, but it doesn't help. Pt also has a Doppler at home so is checking FHT then. Pt urged to come to L&Din future if decr FM occurs, may even consider NST 2x/wk for reassurance. Pt to do bile acid testing 11/5, as pt still has itching, camilla at night, though no rash present. Will do NST today. PTL precautions reviewed. RTC 2 wk or sooner prn.

## 2020-11-02 NOTE — PROGRESS NOTES
OB follow up   Not good fetal movement since yesterday  No VB, LOF or UC's.  Flu vaccine declined  [phone] (home)   Preferred pharmacy confirmed.

## 2020-11-04 ENCOUNTER — PATIENT MESSAGE (OUTPATIENT)
Dept: OBGYN | Facility: CLINIC | Age: 25
End: 2020-11-04

## 2020-11-05 ENCOUNTER — HOSPITAL ENCOUNTER (OUTPATIENT)
Dept: LAB | Facility: MEDICAL CENTER | Age: 25
End: 2020-11-05
Attending: PHYSICIAN ASSISTANT
Payer: MEDICAID

## 2020-11-05 DIAGNOSIS — L29.9 ITCHING: ICD-10-CM

## 2020-11-05 PROCEDURE — 36415 COLL VENOUS BLD VENIPUNCTURE: CPT

## 2020-11-05 PROCEDURE — 82239 BILE ACIDS TOTAL: CPT

## 2020-11-05 NOTE — TELEPHONE ENCOUNTER
----- Message from Sharyn Dennison sent at 11/4/2020  1:49 PM PST -----  Regarding: Prescription Question  Contact: [phone]  Lindsey guzman, I know I am supposed to go get my blood drawn for the biolacids tomorrow But I'm a little worried and irritated, the itching has gotten absolutely worse, I've tried the anti icth lotions aloe vera and nothing, at this point I don't know what else I can do, !!    11/4/20 1605 Consulted with midwife Zenia Mccray and recommends pt to try Benadryl cream and have Bile Acids done tomorrow. Spoke with pt and informed her of above, pt also reports decreased FM since yesterday. Pt reports baby did not pass FADI last night and has barely felt baby move today. Adv pt to go to L&D for further evaluation. Pt understood and will comply

## 2020-11-07 LAB — BILE AC SERPL-SCNC: 10 UMOL/L (ref 0–10)

## 2020-11-17 ENCOUNTER — HOSPITAL ENCOUNTER (EMERGENCY)
Facility: MEDICAL CENTER | Age: 25
End: 2020-11-17
Attending: OBSTETRICS & GYNECOLOGY | Admitting: OBSTETRICS & GYNECOLOGY
Payer: MEDICAID

## 2020-11-17 ENCOUNTER — PATIENT MESSAGE (OUTPATIENT)
Dept: OBGYN | Facility: CLINIC | Age: 25
End: 2020-11-17

## 2020-11-17 VITALS
HEART RATE: 93 BPM | TEMPERATURE: 97.2 F | DIASTOLIC BLOOD PRESSURE: 72 MMHG | OXYGEN SATURATION: 96 % | WEIGHT: 276 LBS | HEIGHT: 66 IN | SYSTOLIC BLOOD PRESSURE: 120 MMHG | BODY MASS INDEX: 44.36 KG/M2

## 2020-11-17 LAB
ALBUMIN SERPL BCP-MCNC: 3.2 G/DL (ref 3.2–4.9)
ALBUMIN/GLOB SERPL: 0.9 G/DL
ALP SERPL-CCNC: 114 U/L (ref 30–99)
ALT SERPL-CCNC: 9 U/L (ref 2–50)
ANION GAP SERPL CALC-SCNC: 11 MMOL/L (ref 7–16)
APPEARANCE UR: ABNORMAL
AST SERPL-CCNC: 14 U/L (ref 12–45)
BACTERIA #/AREA URNS HPF: ABNORMAL /HPF
BASOPHILS # BLD AUTO: 0.3 % (ref 0–1.8)
BASOPHILS # BLD: 0.03 K/UL (ref 0–0.12)
BILIRUB SERPL-MCNC: <0.2 MG/DL (ref 0.1–1.5)
BILIRUB UR QL STRIP.AUTO: NEGATIVE
BUN SERPL-MCNC: 8 MG/DL (ref 8–22)
CALCIUM SERPL-MCNC: 9.1 MG/DL (ref 8.5–10.5)
CHLORIDE SERPL-SCNC: 105 MMOL/L (ref 96–112)
CO2 SERPL-SCNC: 20 MMOL/L (ref 20–33)
COLOR UR: ABNORMAL
COVID ORDER STATUS COVID19: NORMAL
CREAT SERPL-MCNC: 0.4 MG/DL (ref 0.5–1.4)
EOSINOPHIL # BLD AUTO: 0.18 K/UL (ref 0–0.51)
EOSINOPHIL NFR BLD: 1.6 % (ref 0–6.9)
EPI CELLS #/AREA URNS HPF: ABNORMAL /HPF
ERYTHROCYTE [DISTWIDTH] IN BLOOD BY AUTOMATED COUNT: 40.6 FL (ref 35.9–50)
GLOBULIN SER CALC-MCNC: 3.4 G/DL (ref 1.9–3.5)
GLUCOSE SERPL-MCNC: 70 MG/DL (ref 65–99)
GLUCOSE UR STRIP.AUTO-MCNC: NEGATIVE MG/DL
HCT VFR BLD AUTO: 37.5 % (ref 37–47)
HGB BLD-MCNC: 11.6 G/DL (ref 12–16)
IMM GRANULOCYTES # BLD AUTO: 0.08 K/UL (ref 0–0.11)
IMM GRANULOCYTES NFR BLD AUTO: 0.7 % (ref 0–0.9)
KETONES UR STRIP.AUTO-MCNC: ABNORMAL MG/DL
LEUKOCYTE ESTERASE UR QL STRIP.AUTO: ABNORMAL
LYMPHOCYTES # BLD AUTO: 2.64 K/UL (ref 1–4.8)
LYMPHOCYTES NFR BLD: 23.4 % (ref 22–41)
MCH RBC QN AUTO: 25.6 PG (ref 27–33)
MCHC RBC AUTO-ENTMCNC: 30.9 G/DL (ref 33.6–35)
MCV RBC AUTO: 82.8 FL (ref 81.4–97.8)
MICRO URNS: ABNORMAL
MONOCYTES # BLD AUTO: 0.61 K/UL (ref 0–0.85)
MONOCYTES NFR BLD AUTO: 5.4 % (ref 0–13.4)
NEUTROPHILS # BLD AUTO: 7.74 K/UL (ref 2–7.15)
NEUTROPHILS NFR BLD: 68.6 % (ref 44–72)
NITRITE UR QL STRIP.AUTO: NEGATIVE
NRBC # BLD AUTO: 0 K/UL
NRBC BLD-RTO: 0 /100 WBC
PH UR STRIP.AUTO: 5.5 [PH] (ref 5–8)
PLATELET # BLD AUTO: 315 K/UL (ref 164–446)
PMV BLD AUTO: 11.6 FL (ref 9–12.9)
POTASSIUM SERPL-SCNC: 3.8 MMOL/L (ref 3.6–5.5)
PROT SERPL-MCNC: 6.6 G/DL (ref 6–8.2)
PROT UR QL STRIP: 30 MG/DL
RBC # BLD AUTO: 4.53 M/UL (ref 4.2–5.4)
RBC # URNS HPF: ABNORMAL /HPF
RBC UR QL AUTO: NEGATIVE
SARS-COV-2 RDRP RESP QL NAA+PROBE: NOTDETECTED
SODIUM SERPL-SCNC: 136 MMOL/L (ref 135–145)
SP GR UR STRIP.AUTO: 1.03
SPECIMEN SOURCE: NORMAL
UROBILINOGEN UR STRIP.AUTO-MCNC: 1 MG/DL
WBC # BLD AUTO: 11.3 K/UL (ref 4.8–10.8)
WBC #/AREA URNS HPF: ABNORMAL /HPF

## 2020-11-17 PROCEDURE — 59025 FETAL NON-STRESS TEST: CPT

## 2020-11-17 PROCEDURE — 700105 HCHG RX REV CODE 258: Performed by: OBSTETRICS & GYNECOLOGY

## 2020-11-17 PROCEDURE — 80053 COMPREHEN METABOLIC PANEL: CPT

## 2020-11-17 PROCEDURE — 85025 COMPLETE CBC W/AUTO DIFF WBC: CPT

## 2020-11-17 PROCEDURE — 700111 HCHG RX REV CODE 636 W/ 250 OVERRIDE (IP): Performed by: OBSTETRICS & GYNECOLOGY

## 2020-11-17 PROCEDURE — 99282 EMERGENCY DEPT VISIT SF MDM: CPT | Mod: 25

## 2020-11-17 PROCEDURE — 36415 COLL VENOUS BLD VENIPUNCTURE: CPT

## 2020-11-17 PROCEDURE — 96374 THER/PROPH/DIAG INJ IV PUSH: CPT

## 2020-11-17 PROCEDURE — 99214 OFFICE O/P EST MOD 30 MIN: CPT | Performed by: OBSTETRICS & GYNECOLOGY

## 2020-11-17 PROCEDURE — 81001 URINALYSIS AUTO W/SCOPE: CPT

## 2020-11-17 PROCEDURE — 83789 MASS SPECTROMETRY QUAL/QUAN: CPT

## 2020-11-17 PROCEDURE — U0004 COV-19 TEST NON-CDC HGH THRU: HCPCS

## 2020-11-17 PROCEDURE — C9803 HOPD COVID-19 SPEC COLLECT: HCPCS | Performed by: OBSTETRICS & GYNECOLOGY

## 2020-11-17 RX ORDER — ONDANSETRON 2 MG/ML
4 INJECTION INTRAMUSCULAR; INTRAVENOUS EVERY 4 HOURS PRN
Status: DISCONTINUED | OUTPATIENT
Start: 2020-11-17 | End: 2020-11-17 | Stop reason: HOSPADM

## 2020-11-17 RX ORDER — SODIUM CHLORIDE, SODIUM LACTATE, POTASSIUM CHLORIDE, AND CALCIUM CHLORIDE .6; .31; .03; .02 G/100ML; G/100ML; G/100ML; G/100ML
1000 INJECTION, SOLUTION INTRAVENOUS ONCE
Status: COMPLETED | OUTPATIENT
Start: 2020-11-17 | End: 2020-11-17

## 2020-11-17 RX ADMIN — ONDANSETRON 4 MG: 2 INJECTION INTRAMUSCULAR; INTRAVENOUS at 16:27

## 2020-11-17 RX ADMIN — SODIUM CHLORIDE, POTASSIUM CHLORIDE, SODIUM LACTATE AND CALCIUM CHLORIDE 1000 ML: 600; 310; 30; 20 INJECTION, SOLUTION INTRAVENOUS at 16:01

## 2020-11-17 ASSESSMENT — FIBROSIS 4 INDEX: FIB4 SCORE: 0.28

## 2020-11-17 NOTE — PROGRESS NOTES
Pt presents to L&D with complaints of N/V that has not improved over the last three days. Pt denies any exposure to COVID that she knows of. Pt does sound raspy and has coughed in RN's presence. Pt ambulated to S214 for assessment.     1410 TOCO and EFM applied, VSS. Pt reports +FM but states she doesn't move as much as her other kids. Pt states she is supposed to be getting weekly NST's but does not have anyone to watch her 2 yr old. Pt states her older children are currently going to school full time. Pt states she has an episode of N/V on  and states she was not hungry all day and that is very abnormal for her. Pt states she has been able to resume eating small meals and drinking fluids but still does not feel very well. Pt educated on POC. RN to updated Dr. Mackenzie on pt status.     1440 Dr. Varela updated on pt status. Will await lab results and create POC.     1550 RN and Dr. Mackenzie at bedside, POC discussed and labs reviewed. Pt has an appointment on Friday.     1758 RN at bedside,  labor precautions given and instructions on when to return to L&D discussed. Pt verbalized understanding.

## 2020-11-17 NOTE — TELEPHONE ENCOUNTER
----- Message from Sharyn Dennison sent at 11/17/2020  9:12 AM PST -----  Regarding: Non-Urgent Medical Question  Contact: [phone]  Ismael guzman I have an appointment with you on Friday, But for the last few days I've been getting really light headed and very very naseous, this morning I decided to take my blood pressure and it said 180/89  I did it again and it said 177/89 I don't know if that normal or if I should be worried?

## 2020-11-17 NOTE — TELEPHONE ENCOUNTER
Called pt and spoke with Dr. Carey and was told that pt needed to be evaluated at L&D advised pt to go to L&D pt understood and agreed pt also stated that she was having spots in her vision for 3 days.

## 2020-11-17 NOTE — H&P
History and Physical      Sharyn Dennison is a 24 y.o. year old female  at 34w3d, presents with nausea and vomiting of 3 days duration. She additionally complains of headaches, visual disturbances, and constipation.  She denies abdominal pain, back pain, fevers, chills.  She has a recorded history of cholestasis; an is complaining of increased itching.   Pt has been unable to keep solid intake down. She has been able to hydrate with ice chips.   She denies experiencing this level of nausea during this and prior pregnancies.       Subjective:   negative  For CTXS.   negative Feels pain   negative for vaginal bleeding.   positive for fetal movement    ROS: As per HPI    Past Medical History:   Diagnosis Date   • Depression     postpartum depression   • Ovarian cyst 13     No past surgical history on file.  OB History    Para Term  AB Living   5 4 4     4   SAB TAB Ectopic Molar Multiple Live Births           0 4      # Outcome Date GA Lbr Chad/2nd Weight Sex Delivery Anes PTL Lv   5 Current            4 Term 10/20/18 39w2d  4.015 kg (8 lb 13.6 oz) F Vag-Spont EPI N SANCHEZ   3 Term 14 39w0d  3.856 kg (8 lb 8 oz) F Vag-Spont   SANCHEZ   2 Term 12 41w2d  3.544 kg (7 lb 13 oz) M Vag-Spont EPI  SANCHEZ      Birth Comments: baby was not breathing when born    1 Term 07/01/10 40w6d 04:00 4.026 kg (8 lb 14 oz) F Vag-Spont None N SANCHEZ      Birth Comments: no complications     Social History     Socioeconomic History   • Marital status:      Spouse name: Not on file   • Number of children: Not on file   • Years of education: Not on file   • Highest education level: Not on file   Occupational History   • Not on file   Social Needs   • Financial resource strain: Not on file   • Food insecurity     Worry: Not on file     Inability: Not on file   • Transportation needs     Medical: Not on file     Non-medical: Not on file   Tobacco Use   • Smoking status: Former Smoker     Quit  "date: 10/19/2017     Years since quitting: 3.0   • Smokeless tobacco: Never Used   Substance and Sexual Activity   • Alcohol use: No   • Drug use: Not Currently     Comment: marijuana   • Sexual activity: Yes     Partners: Male     Birth control/protection: Surgical, None     Comment: desires postpartum btl.    Lifestyle   • Physical activity     Days per week: Not on file     Minutes per session: Not on file   • Stress: Not on file   Relationships   • Social connections     Talks on phone: Not on file     Gets together: Not on file     Attends Gnosticism service: Not on file     Active member of club or organization: Not on file     Attends meetings of clubs or organizations: Not on file     Relationship status: Not on file   • Intimate partner violence     Fear of current or ex partner: Not on file     Emotionally abused: Not on file     Physically abused: Not on file     Forced sexual activity: Not on file   Other Topics Concern   • Not on file   Social History Narrative    ** Merged History Encounter **          Allergies: Nkda [no known drug allergy]  No current facility-administered medications on file prior to encounter.      Current Outpatient Medications on File Prior to Encounter   Medication Sig Dispense Refill   • azithromycin (ZITHROMAX) 250 MG Tab Take 4 tabs PO x 1 dose to equal 1 gram (Patient not taking: Reported on 9/9/2020) 4 Tab 0   • Prenatal Vit-Fe Fumarate-FA (PRENATAL 1+1 PO) Take  by mouth.           Objective:      /72   Pulse 93   Temp 36.2 °C (97.2 °F) (Temporal)   Ht 1.676 m (5' 6\")   Wt (!) 125.2 kg (276 lb)   SpO2 96%     General: Afebrile, NAD, nontoxic  Lungs: CTABL  Heart: RRR, NRMG  Abdomen: gravid, nontender  Skin: No rash, warm and dry  Extremities: no peripheral edema, calves nontender    NST INTERPRETATION - PER MY READ    INDICATIONS: Nausea vomiting in pregnancy  UTERINE ACTIVITY: None  BASELINE: 130s  VARIABILITY: Moderate  ACCELERATIONS: Present  DECELERATIONS: " Absent  CATEGORY 1 TRACING        Lab Review  Lab:   Blood type: A    Hgb: non-reactive   HIV: non-reactive   GC: negative   Chlamydia: negative    Rubella: immune        Recent Labs     20  1018 20  1310   ABOGROUP A  --    RUBELLAIGG 217.00  --    RPR Non Reactive Non Reactive   HEPBSAG Non-Reactive  --    HEPCAB Non-Reactive  --        No results for input(s): WBC, RBC, HEMOGLOBIN, HEMATOCRIT, MCV, MCH, RDW, PLATELETCT, MPV, NEUTSPOLYS, LYMPHOCYTES, MONOCYTES, EOSINOPHILS, BASOPHILS, RBCMORPHOLO in the last 72 hours.  No results for input(s): SODIUM, POTASSIUM, CHLORIDE, CO2, GLUCOSE, BUN, CPKTOTAL in the last 72 hours.        Assessment/Plan:   Sharyn Dennison is a 24 y.o. year old female  at 34w3d who presented with nausea and vomiting.  Symptoms are improved with IV hydration, and Zofran.  She has no signs of infection, CBC and CMP are within normal limits.  Urine analysis appears contaminated.    Suspect related to GERD.  Pepcid recommended.  Discussed small frequent meals, and increasing p.o. hydration.    Bile acids were previously at the upper limits of normal, consistent with early cholestasis of pregnancy.  Repeat bile acids ordered, but are pending.  She is currently getting twice weekly NSTs at the office.  We discussed that if the bile acids are elevated, she will be induced between 36 and 37 weeks.    Patient discharged to home with  labor precautions.    Cydney Mackenzie MD

## 2020-11-18 NOTE — DISCHARGE INSTRUCTIONS
General Instructions:  · If you think you are in labor, time contractions (lying on your left side) from the beginning of one contraction to the beginning of the next contraction for at least one hour.  · Increase fluid intake: you should consume 10-12 8 oz glasses of non-caffeinated fluid per day.  · Report any pressure or burning on urination to your physician.  · Monitor fetal movement: If you notice an absence or decrease in fetal movement, drink a large glass of water and rest on your side.  If there is no increase in movement, call your physician or go to the hospital for further evaluation.  · Report any sudden, sharp abdominal pain.  · Report any bleeding.  Spotting or pinkish discharge is normal after vaginal exam.  You may also spot after sexual intercourse.    Pre-term Labor (<37 weeks):  Call your physician or return to the hospital if:  · You have painless regular contractions more than 4 in one hour.  · Your water breaks (remember time and color).  · You have menstrual-like cramps, a low dull backache or pressure in your pelvis or back.  · Your baby does not move enough to complete the daily kick count (10 movements in 2 hours).  · Your baby moves much less often than on the days before or you have not felt your baby move all day.  · Please review the MEDICATION LIST section of your AFTER VISIT SUMMARY document.  · Take your medication as prescribed      Cholestasis of Pregnancy    Cholestasis refers to any condition that causes the flow of digestive fluid (bile) produced by the liver to slow down or stop. Cholestasis of pregnancy is most common toward the end of pregnancy (thirdtrimester), but it can occur any time during pregnancy. The condition often goes away soon after giving birth.  Cholestasis may be uncomfortable, but it is usually harmless to you. However, it can be harmful to your baby. Cholestasis may increase the risk of:  · Your baby being born too early ( delivery).  · Your baby  having a slow heart rate and lack of oxygen during delivery (fetal distress).  · Losing your baby before delivery (stillbirth).  What are the causes?  The exact cause of this condition is not known, but it may be related to:  · Pregnancy hormones. The gallbladder normally holds bile until you need it to help digest fat in your diet. Pregnancy hormones may cause the flow of bile to slow down and back up into your liver. Bile may then get into your bloodstream and cause cholestasis symptoms.  · Changes in your genes (genetic mutations). Specifically, genes that affect how the liver releases bile.  What increases the risk?  You are more likely to develop this condition if:  · You had cholestasis during a previous pregnancy.  · You have a family history of cholestasis.  · You have liver problems.  · You are having multiple babies, such as twins or triplets.  What are the signs or symptoms?  The most common symptom of this condition is intense itching (pruritus), especially on the palms of your hands and soles of your feet. The itching can spread to the rest of your body and is often worse at night. You will not usually have a rash. Other symptoms may include:  · Feeling tired.  · Pain in your upper right abdomen.  · Dark-colored urine.  · Light-colored stools.  · Poor appetite.  · Yellowish discoloration of your skin and the whites of your eyes (jaundice).  How is this diagnosed?  This condition is diagnosed based on:  · Your medical history.  · A physical exam.  · Blood tests.  If you have an inherited risk for developing this condition, you may also have genetic testing.  How is this treated?  The goal of treatment is to make you comfortable and keep your baby safe. Your health care provider may:  · Prescribe medicine to reduce bile acid in your bloodstream, relieve symptoms, and help keep your baby safe.  · Give you vitamin K before delivery to prevent excessive bleeding.  · Check your baby frequently (fetal  monitoring).  · Perform regular blood tests to check your bile levels and liver function until your baby is delivered.  · Recommend starting (inducing) your labor and delivery by week 36 or 37 of pregnancy, or as soon as your baby's lungs have developed enough.  Follow these instructions at home:  · Take over-the-counter and prescription medicines only as told by your health care provider.  · Take cool baths to soothe itchy skin.  · Wear comfortable, loose-fitting, cotton clothing to reduce itching.  · Keep your fingernails short to prevent skin irritation from scratching.  · Keep all follow-up visits and prenatal visits as told by your health care provider. This is important.  Contact a health care provider if:  · Your symptoms get worse, even with treatment.  · You develop pain in your right side.  · You have unusual swelling in your abdomen, feet, ankles, or legs.  · You have a fever.  · You are more thirsty than usual.  Get help right away if:  · You go into early labor.  · You have a headache that does not go away or causes changes in vision.  · You have nausea or you vomit.  · You have severe pain in your abdomen or shoulders.  · You have shortness of breath.  Summary  · Cholestasis of pregnancy is most common toward the end of pregnancy (thirdtrimester), but it can occur any time during your pregnancy.  · The condition often goes away soon after your baby is born.  · The most common symptom of cholestasis of pregnancy is intense itching (pruritus), especially on the palms of your hands and soles of your feet.  · This condition may be treated with medicine, frequent monitoring, or starting (inducing) labor and delivery by week 36 or 37 of pregnancy.  This information is not intended to replace advice given to you by your health care provider. Make sure you discuss any questions you have with your health care provider.  Document Released: 12/15/2001 Document Revised: 04/09/2020 Document Reviewed:  12/02/2017  Elsevier Patient Education © 2020 [employer]    Other Instructions:  Please carefully review your entire AFTER VISIT SUMMARY document for all discharge instructions.

## 2020-11-19 ENCOUNTER — TELEPHONE (OUTPATIENT)
Dept: OBGYN | Facility: CLINIC | Age: 25
End: 2020-11-19

## 2020-11-19 NOTE — TELEPHONE ENCOUNTER
Pt called triage line stating she is returning a call. No documentation that someone called her?? Called pt left message to call back.

## 2020-11-20 ENCOUNTER — NON-PROVIDER VISIT (OUTPATIENT)
Dept: OBGYN | Facility: CLINIC | Age: 25
End: 2020-11-20

## 2020-11-20 ENCOUNTER — ROUTINE PRENATAL (OUTPATIENT)
Dept: OBGYN | Facility: CLINIC | Age: 25
End: 2020-11-20

## 2020-11-20 VITALS — BODY MASS INDEX: 45.19 KG/M2 | SYSTOLIC BLOOD PRESSURE: 120 MMHG | DIASTOLIC BLOOD PRESSURE: 85 MMHG | WEIGHT: 280 LBS

## 2020-11-20 DIAGNOSIS — O26.613 CHOLESTASIS DURING PREGNANCY IN THIRD TRIMESTER: ICD-10-CM

## 2020-11-20 DIAGNOSIS — K83.1 CHOLESTASIS DURING PREGNANCY IN THIRD TRIMESTER: ICD-10-CM

## 2020-11-20 LAB
NST ACOUSTIC STIMULATION: NORMAL
NST ACTION NECESSARY: NORMAL
NST ASSESSMENT: NORMAL
NST BASELINE: 135
NST INDICATIONS: NORMAL
NST OTHER DATA: NORMAL
NST READ BY: NORMAL
NST RETURN: NORMAL
NST UTERINE ACTIVITY: NORMAL

## 2020-11-20 PROCEDURE — 59025 FETAL NON-STRESS TEST: CPT | Performed by: PHYSICIAN ASSISTANT

## 2020-11-20 PROCEDURE — 90040 PR PRENATAL FOLLOW UP: CPT | Performed by: PHYSICIAN ASSISTANT

## 2020-11-20 RX ORDER — URSODIOL 300 MG/1
300 CAPSULE ORAL 2 TIMES DAILY
Qty: 60 CAP | Refills: 3 | Status: ON HOLD | OUTPATIENT
Start: 2020-11-20 | End: 2020-12-12

## 2020-11-20 ASSESSMENT — FIBROSIS 4 INDEX: FIB4 SCORE: 0.39

## 2020-11-20 NOTE — PROGRESS NOTES
Pt has no complaints with cramping, UCs, Vb, LOF, though continues to have itching, worsening at night. Bile acids done 11/17 - results pending, though incr from 4 to 10, so likely early cholestasis. Will send referral for IOL at 37wk and [place] in today for presumptive cholestasis, though pt aware that if labs remain the same, we will cancel these and plan for IOL prn. +FM. NST today Cat 1 per [place] CNM, read. GBS at 36 wk. PTL precautions reviewed. Plan for NST 2x/wk until delivery. RTC 1-2 wk or sooner prn.

## 2020-11-20 NOTE — PROGRESS NOTES
OB follow up   + fetal movement.  Pt states she has been getting 10 movements in 2 hours  Pt states that her itching has become worse  Pt states that she has nausea that causes her vision to become spotty  No VB, LOF or UC's.  Flu vaccine declined  [phone] (home)   Preferred pharmacy confirmed.

## 2020-11-22 LAB
BILE AC SERPL-SCNC: 2.6 UMOL/L (ref 0–7)
CDCAE SERPL-SCNC: 1 UMOL/L (ref 0–3.4)
CHOLATE SERPL-SCNC: 0.5 UMOL/L (ref 0–1.9)
DO-CHOLATE SERPL-SCNC: 0.8 UMOL/L (ref 0–2.5)
URSODEOXYCHOLATE SERPL-SCNC: 0.3 UMOL/L (ref 0–1)

## 2020-11-23 ENCOUNTER — PATIENT MESSAGE (OUTPATIENT)
Dept: OBGYN | Facility: CLINIC | Age: 25
End: 2020-11-23

## 2020-11-23 NOTE — TELEPHONE ENCOUNTER
----- Message from Sharyn Dennison sent at 11/23/2020  9:16 AM PST -----  Regarding: Procedure Question  Contact: [phone]  Hello I just have a question, so I'm supposed to be getting induced at 37 weeks and was told I need to get the gbs test but was told I did not have to schedule any further appointment beside the nst on Tuesday,  So my question is will they be doing this at the hospital? I had received a set induction of labor appointment at labor and delivery on the 2nd and its now been changed to the 7th correct?

## 2020-12-04 ENCOUNTER — ROUTINE PRENATAL (OUTPATIENT)
Dept: OBGYN | Facility: CLINIC | Age: 25
End: 2020-12-04

## 2020-12-04 ENCOUNTER — HOSPITAL ENCOUNTER (OUTPATIENT)
Facility: MEDICAL CENTER | Age: 25
End: 2020-12-04
Attending: PHYSICIAN ASSISTANT
Payer: COMMERCIAL

## 2020-12-04 ENCOUNTER — HOSPITAL ENCOUNTER (OUTPATIENT)
Dept: OBGYN | Facility: MEDICAL CENTER | Age: 25
End: 2020-12-04
Attending: OBSTETRICS & GYNECOLOGY
Payer: MEDICAID

## 2020-12-04 VITALS — BODY MASS INDEX: 45.68 KG/M2 | SYSTOLIC BLOOD PRESSURE: 116 MMHG | DIASTOLIC BLOOD PRESSURE: 78 MMHG | WEIGHT: 283 LBS

## 2020-12-04 DIAGNOSIS — O09.899 SUPERVISION OF OTHER HIGH RISK PREGNANCY, ANTEPARTUM: ICD-10-CM

## 2020-12-04 DIAGNOSIS — O26.613 CHOLESTASIS DURING PREGNANCY IN THIRD TRIMESTER: ICD-10-CM

## 2020-12-04 DIAGNOSIS — K83.1 CHOLESTASIS: ICD-10-CM

## 2020-12-04 DIAGNOSIS — K83.1 CHOLESTASIS DURING PREGNANCY IN THIRD TRIMESTER: ICD-10-CM

## 2020-12-04 LAB
COVID ORDER STATUS COVID19: NORMAL
NST ACOUSTIC STIMULATION: NORMAL
NST ACTION NECESSARY: NORMAL
NST ASSESSMENT: NORMAL
NST BASELINE: NORMAL
NST INDICATIONS: NORMAL
NST OTHER DATA: NORMAL
NST READ BY: NORMAL
NST RETURN: NORMAL
NST UTERINE ACTIVITY: NORMAL
SARS-COV-2 RNA RESP QL NAA+PROBE: NOTDETECTED
SPECIMEN SOURCE: NORMAL

## 2020-12-04 PROCEDURE — C9803 HOPD COVID-19 SPEC COLLECT: HCPCS | Performed by: OBSTETRICS & GYNECOLOGY

## 2020-12-04 PROCEDURE — 59025 FETAL NON-STRESS TEST: CPT | Performed by: PHYSICIAN ASSISTANT

## 2020-12-04 PROCEDURE — U0003 INFECTIOUS AGENT DETECTION BY NUCLEIC ACID (DNA OR RNA); SEVERE ACUTE RESPIRATORY SYNDROME CORONAVIRUS 2 (SARS-COV-2) (CORONAVIRUS DISEASE [COVID-19]), AMPLIFIED PROBE TECHNIQUE, MAKING USE OF HIGH THROUGHPUT TECHNOLOGIES AS DESCRIBED BY CMS-2020-01-R: HCPCS

## 2020-12-04 PROCEDURE — 90040 PR PRENATAL FOLLOW UP: CPT | Performed by: PHYSICIAN ASSISTANT

## 2020-12-04 ASSESSMENT — FIBROSIS 4 INDEX: FIB4 SCORE: .4

## 2020-12-04 NOTE — PROGRESS NOTES
Pt here for covid screen; test performed; pt tolerated well; given self isolating discharge instructions, verbalizes understanding. Discharged to home.

## 2020-12-04 NOTE — NON-PROVIDER
Pt reports continued itching, which now includes between her fingers and toes. Reports infrequent mild contractions. -VB, -LOF, +FM. Pt does report having to drink ice water to stimulate fetal movement last night.     Reviewed labs from 11/22 which includes normal bile acids of 2.6.     Will reschedule her IOL previously set for 12/7, repeat bile acids, and obtain NST twice weekly, starting today. Plan discussed w/ pt. Labor and return precautions given. Advised to continue to monitor FM.

## 2020-12-04 NOTE — PROGRESS NOTES
OB follow up   + fetal movement.  No VB, LOF or UC's.  Flu vaccine declined  [phone] (home)   Preferred pharmacy confirmed.

## 2020-12-04 NOTE — PROGRESS NOTES
Pt has no complaints with cramping, UCs, VB, LOF, though continues to have itching on full body, worsening. +FM. GBS done today. IOL scheduled 12/7 though last bile acids 2.6, so not elevated. D/w Dr Flynn and will continue to induce on 12/7 as pt has elevated bile acids during this pregnancy, thus she has cholestasis diagnosis. Labor precautions reviewed. Daily FKC recommended. Cervix: 1/th/-2, mid, soft, vtx. RTC prn or to L&D 12/7. Also, NST today Cat 1, pt aware of plan will go to L&D for COVID testing today then IOL Monday.     Note by LAURENT Sun:    Pt reports continued itching, which now includes between her fingers and toes. Reports infrequent mild contractions. -VB, -LOF, +FM. Pt does report having to drink ice water to stimulate fetal movement last night.      Reviewed labs from 11/22 which includes normal bile acids of 2.6.      Will continue with her IOL 12/7, repeat bile acids, and obtain NST twice weekly, starting today. Plan discussed w/ pt. Labor and return precautions given. Advised to continue to monitor FM.

## 2020-12-06 LAB — GP B STREP DNA SPEC QL NAA+PROBE: NEGATIVE

## 2020-12-07 ENCOUNTER — HOSPITAL ENCOUNTER (INPATIENT)
Facility: MEDICAL CENTER | Age: 25
LOS: 5 days | End: 2020-12-12
Attending: OBSTETRICS & GYNECOLOGY | Admitting: OBSTETRICS & GYNECOLOGY
Payer: MEDICAID

## 2020-12-07 ENCOUNTER — ANESTHESIA (OUTPATIENT)
Dept: OBGYN | Facility: MEDICAL CENTER | Age: 25
End: 2020-12-07
Payer: MEDICAID

## 2020-12-07 ENCOUNTER — APPOINTMENT (OUTPATIENT)
Dept: OBGYN | Facility: MEDICAL CENTER | Age: 25
End: 2020-12-07
Attending: OBSTETRICS & GYNECOLOGY
Payer: MEDICAID

## 2020-12-07 ENCOUNTER — ANESTHESIA EVENT (OUTPATIENT)
Dept: OBGYN | Facility: MEDICAL CENTER | Age: 25
End: 2020-12-07
Payer: MEDICAID

## 2020-12-07 DIAGNOSIS — Z98.891 S/P CESAREAN SECTION: ICD-10-CM

## 2020-12-07 LAB
BASOPHILS # BLD AUTO: 0.3 % (ref 0–1.8)
BASOPHILS # BLD: 0.03 K/UL (ref 0–0.12)
EOSINOPHIL # BLD AUTO: 0.13 K/UL (ref 0–0.51)
EOSINOPHIL NFR BLD: 1.2 % (ref 0–6.9)
ERYTHROCYTE [DISTWIDTH] IN BLOOD BY AUTOMATED COUNT: 40.2 FL (ref 35.9–50)
HCT VFR BLD AUTO: 37.4 % (ref 37–47)
HGB BLD-MCNC: 11.7 G/DL (ref 12–16)
HOLDING TUBE BB 8507: NORMAL
IMM GRANULOCYTES # BLD AUTO: 0.05 K/UL (ref 0–0.11)
IMM GRANULOCYTES NFR BLD AUTO: 0.5 % (ref 0–0.9)
LYMPHOCYTES # BLD AUTO: 2.27 K/UL (ref 1–4.8)
LYMPHOCYTES NFR BLD: 20.7 % (ref 22–41)
MCH RBC QN AUTO: 25.1 PG (ref 27–33)
MCHC RBC AUTO-ENTMCNC: 31.3 G/DL (ref 33.6–35)
MCV RBC AUTO: 80.3 FL (ref 81.4–97.8)
MONOCYTES # BLD AUTO: 0.49 K/UL (ref 0–0.85)
MONOCYTES NFR BLD AUTO: 4.5 % (ref 0–13.4)
NEUTROPHILS # BLD AUTO: 7.98 K/UL (ref 2–7.15)
NEUTROPHILS NFR BLD: 72.8 % (ref 44–72)
NRBC # BLD AUTO: 0 K/UL
NRBC BLD-RTO: 0 /100 WBC
PLATELET # BLD AUTO: 326 K/UL (ref 164–446)
PMV BLD AUTO: 11.1 FL (ref 9–12.9)
RBC # BLD AUTO: 4.66 M/UL (ref 4.2–5.4)
WBC # BLD AUTO: 11 K/UL (ref 4.8–10.8)

## 2020-12-07 PROCEDURE — 770002 HCHG ROOM/CARE - OB PRIVATE (112)

## 2020-12-07 PROCEDURE — 3E0S3BZ INTRODUCTION OF ANESTHETIC AGENT INTO EPIDURAL SPACE, PERCUTANEOUS APPROACH: ICD-10-PCS | Performed by: ANESTHESIOLOGY

## 2020-12-07 PROCEDURE — 3E033VJ INTRODUCTION OF OTHER HORMONE INTO PERIPHERAL VEIN, PERCUTANEOUS APPROACH: ICD-10-PCS | Performed by: OBSTETRICS & GYNECOLOGY

## 2020-12-07 PROCEDURE — 700111 HCHG RX REV CODE 636 W/ 250 OVERRIDE (IP): Performed by: ANESTHESIOLOGY

## 2020-12-07 PROCEDURE — 85025 COMPLETE CBC W/AUTO DIFF WBC: CPT

## 2020-12-07 PROCEDURE — 700111 HCHG RX REV CODE 636 W/ 250 OVERRIDE (IP)

## 2020-12-07 PROCEDURE — 700105 HCHG RX REV CODE 258: Performed by: NURSE PRACTITIONER

## 2020-12-07 PROCEDURE — 700105 HCHG RX REV CODE 258: Performed by: FAMILY MEDICINE

## 2020-12-07 PROCEDURE — 10907ZC DRAINAGE OF AMNIOTIC FLUID, THERAPEUTIC FROM PRODUCTS OF CONCEPTION, VIA NATURAL OR ARTIFICIAL OPENING: ICD-10-PCS | Performed by: OBSTETRICS & GYNECOLOGY

## 2020-12-07 PROCEDURE — 36415 COLL VENOUS BLD VENIPUNCTURE: CPT

## 2020-12-07 PROCEDURE — 700111 HCHG RX REV CODE 636 W/ 250 OVERRIDE (IP): Performed by: FAMILY MEDICINE

## 2020-12-07 PROCEDURE — 700105 HCHG RX REV CODE 258: Performed by: ANESTHESIOLOGY

## 2020-12-07 RX ORDER — SODIUM CHLORIDE, SODIUM LACTATE, POTASSIUM CHLORIDE, CALCIUM CHLORIDE 600; 310; 30; 20 MG/100ML; MG/100ML; MG/100ML; MG/100ML
INJECTION, SOLUTION INTRAVENOUS CONTINUOUS
Status: ACTIVE | OUTPATIENT
Start: 2020-12-07 | End: 2020-12-07

## 2020-12-07 RX ORDER — ROPIVACAINE HYDROCHLORIDE 2 MG/ML
INJECTION, SOLUTION EPIDURAL; INFILTRATION; PERINEURAL
Status: COMPLETED
Start: 2020-12-07 | End: 2020-12-07

## 2020-12-07 RX ORDER — CARBOPROST TROMETHAMINE 250 UG/ML
250 INJECTION, SOLUTION INTRAMUSCULAR
Status: DISCONTINUED | OUTPATIENT
Start: 2020-12-07 | End: 2020-12-08 | Stop reason: HOSPADM

## 2020-12-07 RX ORDER — BUPIVACAINE HYDROCHLORIDE 2.5 MG/ML
INJECTION, SOLUTION EPIDURAL; INFILTRATION; INTRACAUDAL
Status: COMPLETED
Start: 2020-12-07 | End: 2020-12-07

## 2020-12-07 RX ORDER — MISOPROSTOL 200 UG/1
800 TABLET ORAL
Status: DISCONTINUED | OUTPATIENT
Start: 2020-12-07 | End: 2020-12-08 | Stop reason: HOSPADM

## 2020-12-07 RX ORDER — METHYLERGONOVINE MALEATE 0.2 MG/ML
0.2 INJECTION INTRAVENOUS
Status: DISCONTINUED | OUTPATIENT
Start: 2020-12-07 | End: 2020-12-08 | Stop reason: HOSPADM

## 2020-12-07 RX ORDER — BUPIVACAINE HYDROCHLORIDE 2.5 MG/ML
INJECTION, SOLUTION EPIDURAL; INFILTRATION; INTRACAUDAL PRN
Status: DISCONTINUED | OUTPATIENT
Start: 2020-12-07 | End: 2020-12-08 | Stop reason: SURG

## 2020-12-07 RX ORDER — SODIUM CHLORIDE, SODIUM LACTATE, POTASSIUM CHLORIDE, CALCIUM CHLORIDE 600; 310; 30; 20 MG/100ML; MG/100ML; MG/100ML; MG/100ML
INJECTION, SOLUTION INTRAVENOUS CONTINUOUS
Status: DISCONTINUED | OUTPATIENT
Start: 2020-12-07 | End: 2020-12-09

## 2020-12-07 RX ADMIN — FENTANYL CITRATE 100 MCG: 50 INJECTION, SOLUTION INTRAMUSCULAR; INTRAVENOUS at 22:11

## 2020-12-07 RX ADMIN — BUPIVACAINE HYDROCHLORIDE 8 ML: 2.5 INJECTION, SOLUTION EPIDURAL; INFILTRATION; INTRACAUDAL; PERINEURAL at 22:11

## 2020-12-07 RX ADMIN — SODIUM CHLORIDE, POTASSIUM CHLORIDE, SODIUM LACTATE AND CALCIUM CHLORIDE: 600; 310; 30; 20 INJECTION, SOLUTION INTRAVENOUS at 22:00

## 2020-12-07 RX ADMIN — FENTANYL CITRATE 100 MCG: 50 INJECTION, SOLUTION INTRAMUSCULAR; INTRAVENOUS at 21:39

## 2020-12-07 RX ADMIN — OXYTOCIN 2 MILLI-UNITS/MIN: 10 INJECTION, SOLUTION INTRAMUSCULAR; INTRAVENOUS at 15:05

## 2020-12-07 RX ADMIN — SODIUM CHLORIDE, SODIUM GLUCONATE, SODIUM ACETATE, POTASSIUM CHLORIDE AND MAGNESIUM CHLORIDE: 526; 502; 368; 37; 30 INJECTION, SOLUTION INTRAVENOUS at 22:02

## 2020-12-07 RX ADMIN — SODIUM CHLORIDE, POTASSIUM CHLORIDE, SODIUM LACTATE AND CALCIUM CHLORIDE: 600; 310; 30; 20 INJECTION, SOLUTION INTRAVENOUS at 22:39

## 2020-12-07 RX ADMIN — SODIUM CHLORIDE, POTASSIUM CHLORIDE, SODIUM LACTATE AND CALCIUM CHLORIDE: 600; 310; 30; 20 INJECTION, SOLUTION INTRAVENOUS at 15:04

## 2020-12-07 RX ADMIN — ROPIVACAINE HYDROCHLORIDE 200 MG: 2 INJECTION, SOLUTION EPIDURAL; INFILTRATION at 22:10

## 2020-12-07 SDOH — ECONOMIC STABILITY: FOOD INSECURITY: WITHIN THE PAST 12 MONTHS, YOU WORRIED THAT YOUR FOOD WOULD RUN OUT BEFORE YOU GOT MONEY TO BUY MORE.: PATIENT DECLINED

## 2020-12-07 SDOH — ECONOMIC STABILITY: TRANSPORTATION INSECURITY
IN THE PAST 12 MONTHS, HAS THE LACK OF TRANSPORTATION KEPT YOU FROM MEDICAL APPOINTMENTS OR FROM GETTING MEDICATIONS?: PATIENT DECLINED

## 2020-12-07 SDOH — ECONOMIC STABILITY: FOOD INSECURITY: WITHIN THE PAST 12 MONTHS, THE FOOD YOU BOUGHT JUST DIDN'T LAST AND YOU DIDN'T HAVE MONEY TO GET MORE.: PATIENT DECLINED

## 2020-12-07 SDOH — ECONOMIC STABILITY: TRANSPORTATION INSECURITY
IN THE PAST 12 MONTHS, HAS LACK OF TRANSPORTATION KEPT YOU FROM MEETINGS, WORK, OR FROM GETTING THINGS NEEDED FOR DAILY LIVING?: PATIENT DECLINED

## 2020-12-07 ASSESSMENT — LIFESTYLE VARIABLES
EVER FELT BAD OR GUILTY ABOUT YOUR DRINKING: NO
EVER HAD A DRINK FIRST THING IN THE MORNING TO STEADY YOUR NERVES TO GET RID OF A HANGOVER: NO
TOTAL SCORE: 0
TOTAL SCORE: 0
ALCOHOL_USE: NO
AVERAGE NUMBER OF DAYS PER WEEK YOU HAVE A DRINK CONTAINING ALCOHOL: 0
HAVE PEOPLE ANNOYED YOU BY CRITICIZING YOUR DRINKING: NO
TOTAL SCORE: 0
CONSUMPTION TOTAL: NEGATIVE
HAVE YOU EVER FELT YOU SHOULD CUT DOWN ON YOUR DRINKING: NO
EVER_SMOKED: NEVER
HOW MANY TIMES IN THE PAST YEAR HAVE YOU HAD 5 OR MORE DRINKS IN A DAY: 0
ON A TYPICAL DAY WHEN YOU DRINK ALCOHOL HOW MANY DRINKS DO YOU HAVE: 0
DOES PATIENT WANT TO STOP DRINKING: NO

## 2020-12-07 ASSESSMENT — PATIENT HEALTH QUESTIONNAIRE - PHQ9
2. FEELING DOWN, DEPRESSED, IRRITABLE, OR HOPELESS: NOT AT ALL
SUM OF ALL RESPONSES TO PHQ9 QUESTIONS 1 AND 2: 0
1. LITTLE INTEREST OR PLEASURE IN DOING THINGS: NOT AT ALL
1. LITTLE INTEREST OR PLEASURE IN DOING THINGS: NOT AT ALL
SUM OF ALL RESPONSES TO PHQ9 QUESTIONS 1 AND 2: 0
2. FEELING DOWN, DEPRESSED, IRRITABLE, OR HOPELESS: NOT AT ALL

## 2020-12-07 ASSESSMENT — FIBROSIS 4 INDEX: FIB4 SCORE: .4

## 2020-12-07 NOTE — PROGRESS NOTES
EDC -  EGA - 37.2    1054 - Pt arrived to labor and delivery for scheduled IOL for cholestasis. Pt placed in room A 217.  1147 - External monitors in place X2. Category I FHT at this time. VSS. Pt reports good FM. No complaints of contractions, ROM or vaginal bleeding. SVE /-2. POC discussed with pt and family members, all questions answered. Admission profile complete, IV started, labs drawn.   1245 - Dr Cabello at bedside. Brightlight exam performed, negative. Orders received.   1830 - Dr Cabello at bedside. SVE unchanged. Plan to place a balloon   - Report to VELASQUEZ Bundy RN

## 2020-12-07 NOTE — H&P
LABOR AND DELIVERY HISTORY AND PHYSICAL    PATIENT ID:  NAME:  Sharyn Dennison  MRN:               [id number]  YOB: 1995    CC:  IOL - Cholesterol    HPI:  Sharyn Dennison is a 25 y.o. female  at 37w2d by a 6 week ultrasound performed on 2020 not c/w LMP on No LMP recorded (lmp unknown). Patient is pregnant.. Estimated Date of Delivery: 20  Patient presents complaining of no uterine contractions, with no loss of fluid.  normal fetal movement.  no vaginal bleeding.  Pregnancy was complicated by Cholestasis of pregnancy    ROS: Patient denies any fever chills, nausea, vomiting, headache, chest pain, shortness of breath, or dysuria or unusual swelling of hands or feet.     Prenatal Care: Obtained at [facility], 1st visit 2020  with 9 total visits.  Third trimester BPs were approximately 100/70s.  Total weight gain 21 kg during the pregnancy.    Prenatal Labs:   HepBsAg: Neg HIV: Neg Rubella: Imm   RPR: Neg PAP:  GBS: neg   GC/CT: neg A+ / ABS Neg Quad Screen:           Recent Labs     20  1200   WBC 11.0*   RBC 4.66   HEMOGLOBIN 11.7*   HEMATOCRIT 37.4   MCV 80.3*   MCH 25.1*   RDW 40.2   PLATELETCT 326   MPV 11.1   NEUTSPOLYS 72.80*   LYMPHOCYTES 20.70*   MONOCYTES 4.50   EOSINOPHILS 1.20   BASOPHILS 0.30     No results for input(s): SODIUM, POTASSIUM, CHLORIDE, CO2, GLUCOSE, BUN, CPKTOTAL in the last 72 hours.         POB Hx:  OB History    Para Term  AB Living   5 4 4     4   SAB TAB Ectopic Molar Multiple Live Births           0 4      # Outcome Date GA Lbr Chad/2nd Weight Sex Delivery Anes PTL Lv   5 Current            4 Term 10/20/18 39w2d  4.015 kg (8 lb 13.6 oz) F Vag-Spont EPI N SANCHEZ   3 Term 14 39w0d  3.856 kg (8 lb 8 oz) F Vag-Spont   SANCHEZ   2 Term 12 41w2d  3.544 kg (7 lb 13 oz) M Vag-Spont EPI  SANCHEZ      Birth Comments: baby was not breathing when born    1 Term 07/01/10 40w6d 04:00 4.026 kg (8 lb 14 oz) F Vag-Spont None N SANCHEZ    Birth Comments: no complications       PMH/Problem List:    Past Medical History:   Diagnosis Date   • Depression 2012    postpartum depression   • Ovarian cyst 6/25/13     Patient Active Problem List    Diagnosis Date Noted   • Cholestasis during pregnancy in third trimester - bile acids 10 12/04/2020   • History of syphilis - tx 2017 per pt 06/30/2020   • Marijuana use 06/05/2020   • Supervision of other high risk pregnancy, antepartum 06/02/2020   • History of macrosomia x 2 - 8lb 14oz, 8lb 13.6oz 06/02/2020   • Potential exposure to STD-history of STDs  06/02/2020   • 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      Current Outpatient Medications on File Prior to Encounter   Medication Sig Dispense Refill   • azithromycin (ZITHROMAX) 250 MG Tab Take 4 tabs PO x 1 dose to equal 1 gram (Patient not taking: Reported on 9/9/2020) 4 Tab 0   • Prenatal Vit-Fe Fumarate-FA (PRENATAL 1+1 PO) Take  by mouth.         PSH:    History reviewed. No pertinent surgical history.    Allergies:   Allergies   Allergen Reactions   • Nkda [No Known Drug Allergy]        SH:  Social History     Socioeconomic History   • Marital status:      Spouse name: Not on file   • Number of children: Not on file   • Years of education: Not on file   • Highest education level: Not on file   Occupational History   • Not on file   Social Needs   • Financial resource strain: Not on file   • Food insecurity     Worry: Patient refused     Inability: Patient refused   • Transportation needs     Medical: Patient refused     Non-medical: Patient refused   Tobacco Use   • Smoking status: Former Smoker     Quit date: 10/19/2017     Years since quitting: 3.1   • Smokeless tobacco: Never Used   Substance and Sexual Activity   • Alcohol use: No   • Drug use: Not Currently     Comment:  "marijuana   • Sexual activity: Yes     Partners: Male     Birth control/protection: Surgical, None     Comment: desires postpartum btl.    Lifestyle   • Physical activity     Days per week: Not on file     Minutes per session: Not on file   • Stress: Not on file   Relationships   • Social connections     Talks on phone: Not on file     Gets together: Not on file     Attends [employer] service: Not on file     Active member of club or organization: Not on file     Attends meetings of clubs or organizations: Not on file     Relationship status: Not on file   • Intimate partner violence     Fear of current or ex partner: Not on file     Emotionally abused: Not on file     Physically abused: Not on file     Forced sexual activity: Not on file   Other Topics Concern   • Not on file   Social History Narrative    ** Merged History Encounter **              PHYSICAL EXAM:  Vitals:    20 1152   BP: 110/65   Pulse: 88   Resp: 18   Temp: 36.1 °C (96.9 °F)   TempSrc: Temporal   Weight: (!) 128.4 kg (283 lb)   Height: 1.676 m (5' 6\")     Temp (24hrs), Av.1 °C (96.9 °F), Min:36.1 °C (96.9 °F), Max:36.1 °C (96.9 °F)    General: No acute distress, resting comfortably in bed.  HEENT: normocephalic, nontraumatic, PERRLA, EOMI  Cardiovascular: Heart RRR with no murmurs, rubs or gallops. Distal Pulses 2+  Respiratory: symmetric chest expansion, lungs CTA bilaterally with no wheezes rales or rhonci  Abdomen: gravid, nontender  Musculoskeletal: strength 5/5 in four extremities  Neuro: non focal with no numbness, tingling or changes in sensation    SVE: 25/0%/-2  [place]: Q0 minutes;   EFM:  Baseline 145   mod Variability   Accels to 160   Decels none*    A/P: Intrauterine pregancy at 37w2d weeks in latent labor here for induction of labor for cholestasis.      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 IUP at term  2. Patient is GBS negative  3. Anticipating  induction with Pitocin    Kam Cabello M.D.

## 2020-12-07 NOTE — NON-PROVIDER
OB H&P:    CC: Induction of labor    HPI:  Ms. Sharyn Dennison is a 25 y.o.  @ 37w2d by U/S on 20 with intrahepatic cholestasis of pregnancy requiring induction of labor. Her only complaint at this time is excessive itching throughout her whole body, predominantly in between her fingers and toes bilaterally.    Contractions: Reports mild contractions last night that have since resolved  Loss of fluid: No   Vaginal bleeding: No   Fetal movement: Present but mom states that she sometimes feels decreased fetal movement which subsequently improves following ice chips    PNC with Per Garcia, P.A.    PNL: Rh +, RI, HIV neg, TrepAb pos, HBsAg NR, GC/CT neg/neg  Glucola: 102 (wnl)  GBS: negative    ROS:  Const: denies fevers; reports excessive itching especially between fingers and toes  CV/resp: reports no concerns  GI: denies abd pain, GI concerns  : see HPI  Neuro: denies HA/vision changes    OB History    Para Term  AB Living   5 4 4     4   SAB TAB Ectopic Molar Multiple Live Births           0 4      # Outcome Date GA Lbr Chad/2nd Weight Sex Delivery Anes PTL Lv   5 Current            4 Term 10/20/18 39w2d  4.015 kg (8 lb 13.6 oz) F Vag-Spont EPI N SANCHEZ   3 Term 14 39w0d  3.856 kg (8 lb 8 oz) F Vag-Spont   SANCHEZ   2 Term 12 41w2d  3.544 kg (7 lb 13 oz) M Vag-Spont EPI  SANCHEZ      Birth Comments: baby was not breathing when born    1 Term 07/01/10 40w6d 04:00 4.026 kg (8 lb 14 oz) F Vag-Spont None N SANCHEZ      Birth Comments: no complications     GYN: HSV-2 infection affecting pregnancy in first trimester, hx of chlamydia (tx 20) and syphilis (tx 2017 per pt), no cervical procedures    Past Medical History:   Diagnosis Date   • Depression     postpartum depression   • Ovarian cyst 11     No past surgical history on file.    No current facility-administered medications on file prior to encounter.      Current Outpatient Medications on File Prior to  Encounter   Medication Sig Dispense Refill   • azithromycin (ZITHROMAX) 250 MG Tab Take 4 tabs PO x 1 dose to equal 1 gram (Patient not taking: Reported on 2020) 4 Tab 0   • Prenatal Vit-Fe Fumarate-FA (PRENATAL 1+1 PO) Take  by mouth.       Family History   Problem Relation Age of Onset   • Diabetes Mother         insulin   • Hyperlipidemia Father    • Hypertension Father    • Hypertension Maternal Aunt    • Diabetes Maternal Aunt         pills   • Diabetes Maternal Grandmother         pills   • Hypertension Paternal Grandmother    • Hyperlipidemia Paternal Grandmother      Social History     Tobacco Use   • Smoking status: Former Smoker     Quit date: 10/19/2017     Years since quitting: 3.1   • Smokeless tobacco: Never Used   Substance Use Topics   • Alcohol use: No   • Drug use: Not Currently     Comment: marijuana     PE:  There were no vitals filed for this visit.  gen: AAO, NAD  abd: soft, gravid, NT  Ext: NT, no edema present    SVE: 2/50/-2  FHT: Baseline 140-145, moderate variability, Category 1, + accels/ - decels  kim: 0     Assessment: 25 y.o.  @ 37w2d by U/S on 20 with intrahepatic cholestasis of pregnancy requiring induction of labor.     Plan:  - Induce w/ IV Pitocin  -  ml/hr   - Carboprost 250 mcg IM injection prn   - Fentanyl 100 mcg IV prn  - Monitor FHT's   - Clear liquid diet

## 2020-12-08 LAB
ERYTHROCYTE [DISTWIDTH] IN BLOOD BY AUTOMATED COUNT: 41.3 FL (ref 35.9–50)
HCT VFR BLD AUTO: 30.8 % (ref 37–47)
HGB BLD-MCNC: 9.4 G/DL (ref 12–16)
MCH RBC QN AUTO: 24.8 PG (ref 27–33)
MCHC RBC AUTO-ENTMCNC: 30.5 G/DL (ref 33.6–35)
MCV RBC AUTO: 81.3 FL (ref 81.4–97.8)
PATHOLOGY CONSULT NOTE: NORMAL
PLATELET # BLD AUTO: 315 K/UL (ref 164–446)
PMV BLD AUTO: 11.5 FL (ref 9–12.9)
RBC # BLD AUTO: 3.79 M/UL (ref 4.2–5.4)
WBC # BLD AUTO: 19.7 K/UL (ref 4.8–10.8)

## 2020-12-08 PROCEDURE — 36415 COLL VENOUS BLD VENIPUNCTURE: CPT

## 2020-12-08 PROCEDURE — 700111 HCHG RX REV CODE 636 W/ 250 OVERRIDE (IP): Performed by: ANESTHESIOLOGY

## 2020-12-08 PROCEDURE — 700105 HCHG RX REV CODE 258: Performed by: OBSTETRICS & GYNECOLOGY

## 2020-12-08 PROCEDURE — 700111 HCHG RX REV CODE 636 W/ 250 OVERRIDE (IP): Performed by: OBSTETRICS & GYNECOLOGY

## 2020-12-08 PROCEDURE — 160035 HCHG PACU - 1ST 60 MINS PHASE I: Performed by: OBSTETRICS & GYNECOLOGY

## 2020-12-08 PROCEDURE — 58611 LIGATE OVIDUCT(S) ADD-ON: CPT | Performed by: OBSTETRICS & GYNECOLOGY

## 2020-12-08 PROCEDURE — 88302 TISSUE EXAM BY PATHOLOGIST: CPT | Mod: 59

## 2020-12-08 PROCEDURE — 700102 HCHG RX REV CODE 250 W/ 637 OVERRIDE(OP): Performed by: ANESTHESIOLOGY

## 2020-12-08 PROCEDURE — A9270 NON-COVERED ITEM OR SERVICE: HCPCS | Performed by: ANESTHESIOLOGY

## 2020-12-08 PROCEDURE — 700111 HCHG RX REV CODE 636 W/ 250 OVERRIDE (IP): Performed by: FAMILY MEDICINE

## 2020-12-08 PROCEDURE — 160002 HCHG RECOVERY MINUTES (STAT): Performed by: OBSTETRICS & GYNECOLOGY

## 2020-12-08 PROCEDURE — 85027 COMPLETE CBC AUTOMATED: CPT

## 2020-12-08 PROCEDURE — 700101 HCHG RX REV CODE 250: Performed by: ANESTHESIOLOGY

## 2020-12-08 PROCEDURE — 160029 HCHG SURGERY MINUTES - 1ST 30 MINS LEVEL 4: Performed by: OBSTETRICS & GYNECOLOGY

## 2020-12-08 PROCEDURE — 700111 HCHG RX REV CODE 636 W/ 250 OVERRIDE (IP)

## 2020-12-08 PROCEDURE — 700105 HCHG RX REV CODE 258: Performed by: NURSE PRACTITIONER

## 2020-12-08 PROCEDURE — 700102 HCHG RX REV CODE 250 W/ 637 OVERRIDE(OP)

## 2020-12-08 PROCEDURE — 700105 HCHG RX REV CODE 258: Performed by: ANESTHESIOLOGY

## 2020-12-08 PROCEDURE — 160009 HCHG ANES TIME/MIN: Performed by: OBSTETRICS & GYNECOLOGY

## 2020-12-08 PROCEDURE — 59514 CESAREAN DELIVERY ONLY: CPT | Performed by: OBSTETRICS & GYNECOLOGY

## 2020-12-08 PROCEDURE — A9270 NON-COVERED ITEM OR SERVICE: HCPCS

## 2020-12-08 PROCEDURE — 160048 HCHG OR STATISTICAL LEVEL 1-5: Performed by: OBSTETRICS & GYNECOLOGY

## 2020-12-08 PROCEDURE — 10H07YZ INSERTION OF OTHER DEVICE INTO PRODUCTS OF CONCEPTION, VIA NATURAL OR ARTIFICIAL OPENING: ICD-10-PCS | Performed by: OBSTETRICS & GYNECOLOGY

## 2020-12-08 PROCEDURE — 770002 HCHG ROOM/CARE - OB PRIVATE (112)

## 2020-12-08 PROCEDURE — 303615 HCHG EPIDURAL/SPINAL ANESTHESIA FOR LABOR

## 2020-12-08 PROCEDURE — 0UB70ZZ EXCISION OF BILATERAL FALLOPIAN TUBES, OPEN APPROACH: ICD-10-PCS | Performed by: OBSTETRICS & GYNECOLOGY

## 2020-12-08 PROCEDURE — 160041 HCHG SURGERY MINUTES - EA ADDL 1 MIN LEVEL 4: Performed by: OBSTETRICS & GYNECOLOGY

## 2020-12-08 RX ORDER — ACETAMINOPHEN 500 MG
1000 TABLET ORAL EVERY 6 HOURS
Status: COMPLETED | OUTPATIENT
Start: 2020-12-08 | End: 2020-12-09

## 2020-12-08 RX ORDER — SODIUM CHLORIDE, SODIUM LACTATE, POTASSIUM CHLORIDE, AND CALCIUM CHLORIDE .6; .31; .03; .02 G/100ML; G/100ML; G/100ML; G/100ML
1000 INJECTION, SOLUTION INTRAVENOUS
Status: DISCONTINUED | OUTPATIENT
Start: 2020-12-08 | End: 2020-12-08 | Stop reason: HOSPADM

## 2020-12-08 RX ORDER — SODIUM CHLORIDE, SODIUM LACTATE, POTASSIUM CHLORIDE, AND CALCIUM CHLORIDE .6; .31; .03; .02 G/100ML; G/100ML; G/100ML; G/100ML
250 INJECTION, SOLUTION INTRAVENOUS PRN
Status: DISCONTINUED | OUTPATIENT
Start: 2020-12-08 | End: 2020-12-08 | Stop reason: HOSPADM

## 2020-12-08 RX ORDER — ACETAMINOPHEN 325 MG/1
650 TABLET ORAL EVERY 4 HOURS PRN
Status: DISCONTINUED | OUTPATIENT
Start: 2020-12-08 | End: 2020-12-12 | Stop reason: HOSPADM

## 2020-12-08 RX ORDER — SODIUM CHLORIDE, SODIUM GLUCONATE, SODIUM ACETATE, POTASSIUM CHLORIDE AND MAGNESIUM CHLORIDE 526; 502; 368; 37; 30 MG/100ML; MG/100ML; MG/100ML; MG/100ML; MG/100ML
INJECTION, SOLUTION INTRAVENOUS
Status: DISCONTINUED | OUTPATIENT
Start: 2020-12-07 | End: 2020-12-08 | Stop reason: SURG

## 2020-12-08 RX ORDER — SODIUM CHLORIDE, SODIUM GLUCONATE, SODIUM ACETATE, POTASSIUM CHLORIDE AND MAGNESIUM CHLORIDE 526; 502; 368; 37; 30 MG/100ML; MG/100ML; MG/100ML; MG/100ML; MG/100ML
1500 INJECTION, SOLUTION INTRAVENOUS ONCE
Status: COMPLETED | OUTPATIENT
Start: 2020-12-08 | End: 2020-12-08

## 2020-12-08 RX ORDER — SIMETHICONE 80 MG
80 TABLET,CHEWABLE ORAL 4 TIMES DAILY PRN
Status: DISCONTINUED | OUTPATIENT
Start: 2020-12-08 | End: 2020-12-12 | Stop reason: HOSPADM

## 2020-12-08 RX ORDER — ROPIVACAINE HYDROCHLORIDE 2 MG/ML
INJECTION, SOLUTION EPIDURAL; INFILTRATION; PERINEURAL CONTINUOUS
Status: DISCONTINUED | OUTPATIENT
Start: 2020-12-08 | End: 2020-12-08

## 2020-12-08 RX ORDER — METOCLOPRAMIDE HYDROCHLORIDE 5 MG/ML
INJECTION INTRAMUSCULAR; INTRAVENOUS
Status: COMPLETED
Start: 2020-12-08 | End: 2020-12-08

## 2020-12-08 RX ORDER — HYDROCODONE BITARTRATE AND ACETAMINOPHEN 5; 325 MG/1; MG/1
2 TABLET ORAL EVERY 4 HOURS PRN
Status: DISCONTINUED | OUTPATIENT
Start: 2020-12-08 | End: 2020-12-12 | Stop reason: HOSPADM

## 2020-12-08 RX ORDER — DEXAMETHASONE SODIUM PHOSPHATE 4 MG/ML
INJECTION, SOLUTION INTRA-ARTICULAR; INTRALESIONAL; INTRAMUSCULAR; INTRAVENOUS; SOFT TISSUE PRN
Status: DISCONTINUED | OUTPATIENT
Start: 2020-12-08 | End: 2020-12-08 | Stop reason: SURG

## 2020-12-08 RX ORDER — OXYCODONE HYDROCHLORIDE 5 MG/1
10 TABLET ORAL EVERY 4 HOURS PRN
Status: DISCONTINUED | OUTPATIENT
Start: 2020-12-08 | End: 2020-12-09

## 2020-12-08 RX ORDER — MORPHINE SULFATE 0.5 MG/ML
INJECTION, SOLUTION EPIDURAL; INTRATHECAL; INTRAVENOUS PRN
Status: DISCONTINUED | OUTPATIENT
Start: 2020-12-08 | End: 2020-12-08 | Stop reason: SURG

## 2020-12-08 RX ORDER — DIPHENHYDRAMINE HYDROCHLORIDE 50 MG/ML
12.5 INJECTION INTRAMUSCULAR; INTRAVENOUS EVERY 6 HOURS PRN
Status: DISCONTINUED | OUTPATIENT
Start: 2020-12-08 | End: 2020-12-09

## 2020-12-08 RX ORDER — OXYTOCIN 10 [USP'U]/ML
INJECTION, SOLUTION INTRAMUSCULAR; INTRAVENOUS PRN
Status: DISCONTINUED | OUTPATIENT
Start: 2020-12-08 | End: 2020-12-08 | Stop reason: SURG

## 2020-12-08 RX ORDER — IBUPROFEN 600 MG/1
600 TABLET ORAL EVERY 6 HOURS PRN
Status: DISCONTINUED | OUTPATIENT
Start: 2020-12-08 | End: 2020-12-12 | Stop reason: HOSPADM

## 2020-12-08 RX ORDER — CITRIC ACID/SODIUM CITRATE 334-500MG
SOLUTION, ORAL ORAL
Status: COMPLETED
Start: 2020-12-08 | End: 2020-12-08

## 2020-12-08 RX ORDER — LABETALOL HYDROCHLORIDE 5 MG/ML
5 INJECTION, SOLUTION INTRAVENOUS
Status: DISCONTINUED | OUTPATIENT
Start: 2020-12-08 | End: 2020-12-08 | Stop reason: HOSPADM

## 2020-12-08 RX ORDER — BISACODYL 10 MG
10 SUPPOSITORY, RECTAL RECTAL PRN
Status: DISCONTINUED | OUTPATIENT
Start: 2020-12-08 | End: 2020-12-12 | Stop reason: HOSPADM

## 2020-12-08 RX ORDER — DIPHENHYDRAMINE HYDROCHLORIDE 50 MG/ML
25 INJECTION INTRAMUSCULAR; INTRAVENOUS EVERY 6 HOURS PRN
Status: DISCONTINUED | OUTPATIENT
Start: 2020-12-08 | End: 2020-12-09

## 2020-12-08 RX ORDER — KETOROLAC TROMETHAMINE 30 MG/ML
INJECTION, SOLUTION INTRAMUSCULAR; INTRAVENOUS PRN
Status: DISCONTINUED | OUTPATIENT
Start: 2020-12-08 | End: 2020-12-08 | Stop reason: SURG

## 2020-12-08 RX ORDER — ONDANSETRON 2 MG/ML
INJECTION INTRAMUSCULAR; INTRAVENOUS PRN
Status: DISCONTINUED | OUTPATIENT
Start: 2020-12-08 | End: 2020-12-08 | Stop reason: SURG

## 2020-12-08 RX ORDER — SODIUM CHLORIDE, SODIUM LACTATE, POTASSIUM CHLORIDE, CALCIUM CHLORIDE 600; 310; 30; 20 MG/100ML; MG/100ML; MG/100ML; MG/100ML
INJECTION, SOLUTION INTRAVENOUS PRN
Status: DISCONTINUED | OUTPATIENT
Start: 2020-12-08 | End: 2020-12-12 | Stop reason: HOSPADM

## 2020-12-08 RX ORDER — HYDROMORPHONE HYDROCHLORIDE 1 MG/ML
0.2 INJECTION, SOLUTION INTRAMUSCULAR; INTRAVENOUS; SUBCUTANEOUS
Status: DISCONTINUED | OUTPATIENT
Start: 2020-12-08 | End: 2020-12-09

## 2020-12-08 RX ORDER — DOCUSATE SODIUM 100 MG/1
100 CAPSULE, LIQUID FILLED ORAL 2 TIMES DAILY PRN
Status: DISCONTINUED | OUTPATIENT
Start: 2020-12-08 | End: 2020-12-09

## 2020-12-08 RX ORDER — OXYCODONE HCL 5 MG/5 ML
10 SOLUTION, ORAL ORAL
Status: DISCONTINUED | OUTPATIENT
Start: 2020-12-08 | End: 2020-12-08 | Stop reason: HOSPADM

## 2020-12-08 RX ORDER — LIDOCAINE HCL/EPINEPHRINE/PF 2%-1:200K
VIAL (ML) INJECTION PRN
Status: DISCONTINUED | OUTPATIENT
Start: 2020-12-08 | End: 2020-12-08 | Stop reason: SURG

## 2020-12-08 RX ORDER — MEPERIDINE HYDROCHLORIDE 25 MG/ML
6.25 INJECTION INTRAMUSCULAR; INTRAVENOUS; SUBCUTANEOUS
Status: DISCONTINUED | OUTPATIENT
Start: 2020-12-08 | End: 2020-12-08 | Stop reason: HOSPADM

## 2020-12-08 RX ORDER — AZITHROMYCIN 500 MG/5ML
500 INJECTION, POWDER, LYOPHILIZED, FOR SOLUTION INTRAVENOUS ONCE
Status: COMPLETED | OUTPATIENT
Start: 2020-12-08 | End: 2020-12-08

## 2020-12-08 RX ORDER — METOCLOPRAMIDE HYDROCHLORIDE 5 MG/ML
10 INJECTION INTRAMUSCULAR; INTRAVENOUS ONCE
Status: COMPLETED | OUTPATIENT
Start: 2020-12-08 | End: 2020-12-08

## 2020-12-08 RX ORDER — ACETAMINOPHEN 325 MG/1
325 TABLET ORAL EVERY 4 HOURS PRN
Status: DISCONTINUED | OUTPATIENT
Start: 2020-12-08 | End: 2020-12-12 | Stop reason: HOSPADM

## 2020-12-08 RX ORDER — CEFAZOLIN SODIUM 1 G/3ML
INJECTION, POWDER, FOR SOLUTION INTRAMUSCULAR; INTRAVENOUS PRN
Status: DISCONTINUED | OUTPATIENT
Start: 2020-12-08 | End: 2020-12-08 | Stop reason: SURG

## 2020-12-08 RX ORDER — HALOPERIDOL 5 MG/ML
1 INJECTION INTRAMUSCULAR
Status: DISCONTINUED | OUTPATIENT
Start: 2020-12-08 | End: 2020-12-08 | Stop reason: HOSPADM

## 2020-12-08 RX ORDER — DIPHENHYDRAMINE HYDROCHLORIDE 50 MG/ML
12.5 INJECTION INTRAMUSCULAR; INTRAVENOUS
Status: DISCONTINUED | OUTPATIENT
Start: 2020-12-08 | End: 2020-12-08 | Stop reason: HOSPADM

## 2020-12-08 RX ORDER — CITRIC ACID/SODIUM CITRATE 334-500MG
30 SOLUTION, ORAL ORAL ONCE
Status: COMPLETED | OUTPATIENT
Start: 2020-12-08 | End: 2020-12-08

## 2020-12-08 RX ORDER — ONDANSETRON 2 MG/ML
4 INJECTION INTRAMUSCULAR; INTRAVENOUS EVERY 6 HOURS PRN
Status: DISCONTINUED | OUTPATIENT
Start: 2020-12-08 | End: 2020-12-09

## 2020-12-08 RX ORDER — OXYCODONE HYDROCHLORIDE 5 MG/1
5 TABLET ORAL EVERY 4 HOURS PRN
Status: DISCONTINUED | OUTPATIENT
Start: 2020-12-08 | End: 2020-12-09

## 2020-12-08 RX ORDER — KETOROLAC TROMETHAMINE 30 MG/ML
30 INJECTION, SOLUTION INTRAMUSCULAR; INTRAVENOUS EVERY 6 HOURS
Status: DISCONTINUED | OUTPATIENT
Start: 2020-12-08 | End: 2020-12-09

## 2020-12-08 RX ORDER — VITAMIN A ACETATE, BETA CAROTENE, ASCORBIC ACID, CHOLECALCIFEROL, .ALPHA.-TOCOPHEROL ACETATE, DL-, THIAMINE MONONITRATE, RIBOFLAVIN, NIACINAMIDE, PYRIDOXINE HYDROCHLORIDE, FOLIC ACID, CYANOCOBALAMIN, CALCIUM CARBONATE, FERROUS FUMARATE, ZINC OXIDE, CUPRIC OXIDE 3080; 12; 120; 400; 1; 1.84; 3; 20; 22; 920; 25; 200; 27; 10; 2 [IU]/1; UG/1; MG/1; [IU]/1; MG/1; MG/1; MG/1; MG/1; MG/1; [IU]/1; MG/1; MG/1; MG/1; MG/1; MG/1
1 TABLET, FILM COATED ORAL
Status: DISCONTINUED | OUTPATIENT
Start: 2020-12-08 | End: 2020-12-12 | Stop reason: HOSPADM

## 2020-12-08 RX ORDER — OXYCODONE HCL 5 MG/5 ML
5 SOLUTION, ORAL ORAL
Status: DISCONTINUED | OUTPATIENT
Start: 2020-12-08 | End: 2020-12-08 | Stop reason: HOSPADM

## 2020-12-08 RX ORDER — ONDANSETRON 2 MG/ML
4 INJECTION INTRAMUSCULAR; INTRAVENOUS
Status: DISCONTINUED | OUTPATIENT
Start: 2020-12-08 | End: 2020-12-08 | Stop reason: HOSPADM

## 2020-12-08 RX ADMIN — FENTANYL CITRATE 100 MCG: 50 INJECTION, SOLUTION INTRAMUSCULAR; INTRAVENOUS at 12:25

## 2020-12-08 RX ADMIN — ROPIVACAINE HYDROCHLORIDE: 2 INJECTION, SOLUTION EPIDURAL; INFILTRATION at 06:25

## 2020-12-08 RX ADMIN — ACETAMINOPHEN 1000 MG: 500 TABLET ORAL at 22:39

## 2020-12-08 RX ADMIN — AZITHROMYCIN FOR INJECTION INJECTION, POWDER, LYOPHILIZED, FOR SOLUTION 500 MG: 500 INJECTION INTRAVENOUS at 12:07

## 2020-12-08 RX ADMIN — SODIUM CITRATE AND CITRIC ACID MONOHYDRATE 30 ML: 500; 334 SOLUTION ORAL at 12:01

## 2020-12-08 RX ADMIN — OXYTOCIN 10 MILLI-UNITS/MIN: 10 INJECTION, SOLUTION INTRAMUSCULAR; INTRAVENOUS at 05:36

## 2020-12-08 RX ADMIN — OXYTOCIN 1000 ML: 10 INJECTION, SOLUTION INTRAMUSCULAR; INTRAVENOUS at 12:53

## 2020-12-08 RX ADMIN — ONDANSETRON 4 MG: 2 INJECTION INTRAMUSCULAR; INTRAVENOUS at 12:53

## 2020-12-08 RX ADMIN — ACETAMINOPHEN 1000 MG: 500 TABLET ORAL at 16:36

## 2020-12-08 RX ADMIN — LIDOCAINE HYDROCHLORIDE,EPINEPHRINE BITARTRATE 5 ML: 20; .005 INJECTION, SOLUTION EPIDURAL; INFILTRATION; INTRACAUDAL; PERINEURAL at 12:30

## 2020-12-08 RX ADMIN — PHENYLEPHRINE HYDROCHLORIDE 50 MCG/MIN: 10 INJECTION INTRAVENOUS at 12:36

## 2020-12-08 RX ADMIN — Medication 30 ML: at 12:01

## 2020-12-08 RX ADMIN — KETOROLAC TROMETHAMINE 30 MG: 30 INJECTION, SOLUTION INTRAMUSCULAR at 13:21

## 2020-12-08 RX ADMIN — SODIUM CHLORIDE, SODIUM GLUCONATE, SODIUM ACETATE, POTASSIUM CHLORIDE AND MAGNESIUM CHLORIDE 1500 ML: 526; 502; 368; 37; 30 INJECTION, SOLUTION INTRAVENOUS at 11:30

## 2020-12-08 RX ADMIN — FAMOTIDINE 20 MG: 10 INJECTION INTRAVENOUS at 12:01

## 2020-12-08 RX ADMIN — Medication 0.5 ML: at 12:25

## 2020-12-08 RX ADMIN — SODIUM CHLORIDE, POTASSIUM CHLORIDE, SODIUM LACTATE AND CALCIUM CHLORIDE: 600; 310; 30; 20 INJECTION, SOLUTION INTRAVENOUS at 05:28

## 2020-12-08 RX ADMIN — DEXAMETHASONE SODIUM PHOSPHATE 10 MG: 4 INJECTION, SOLUTION INTRA-ARTICULAR; INTRALESIONAL; INTRAMUSCULAR; INTRAVENOUS; SOFT TISSUE at 12:53

## 2020-12-08 RX ADMIN — LIDOCAINE HYDROCHLORIDE,EPINEPHRINE BITARTRATE 5 ML: 20; .005 INJECTION, SOLUTION EPIDURAL; INFILTRATION; INTRACAUDAL; PERINEURAL at 12:25

## 2020-12-08 RX ADMIN — MORPHINE SULFATE 2 MG: 0.5 INJECTION, SOLUTION EPIDURAL; INTRATHECAL; INTRAVENOUS at 12:53

## 2020-12-08 RX ADMIN — OXYTOCIN 20 UNITS: 10 INJECTION, SOLUTION INTRAMUSCULAR; INTRAVENOUS at 13:42

## 2020-12-08 RX ADMIN — METOCLOPRAMIDE 10 MG: 5 INJECTION, SOLUTION INTRAMUSCULAR; INTRAVENOUS at 12:01

## 2020-12-08 RX ADMIN — CEFAZOLIN 3 G: 330 INJECTION, POWDER, FOR SOLUTION INTRAMUSCULAR; INTRAVENOUS at 12:40

## 2020-12-08 RX ADMIN — METOCLOPRAMIDE HYDROCHLORIDE 10 MG: 5 INJECTION INTRAMUSCULAR; INTRAVENOUS at 12:01

## 2020-12-08 RX ADMIN — KETOROLAC TROMETHAMINE 30 MG: 30 INJECTION, SOLUTION INTRAMUSCULAR at 20:30

## 2020-12-08 ASSESSMENT — PAIN DESCRIPTION - PAIN TYPE
TYPE: ACUTE PAIN
TYPE: ACUTE PAIN

## 2020-12-08 ASSESSMENT — PAIN SCALES - GENERAL: PAIN_LEVEL: 0

## 2020-12-08 NOTE — ANESTHESIA PROCEDURE NOTES
Epidural Block    Date/Time: 12/7/2020 10:11 PM  Performed by: Alcon Ventura M.D.  Authorized by: Alcon Ventura M.D.     Patient Location:  OB  Start Time:  12/7/2020 10:11 PM  Reason for Block: labor analgesia    patient identified, IV checked, site marked, risks and benefits discussed, surgical consent, monitors and equipment checked, pre-op evaluation and timeout performed    Patient Position:  Sitting  Prep: ChloraPrep, patient draped and sterile technique    Monitoring:  Blood pressure, continuous pulse oximetry and heart rate  Approach:  Midline  Location:  L3-L4  Injection Technique:  DAVID air  Skin infiltration:  Lidocaine  Strength:  1%  Dose:  3ml  Needle Type:  Tuohy  Needle Gauge:  17 G  Needle Length:  3.5 in  Loss of resistance::  9  Catheter Size:  19 G  Catheter at Skin Depth:  14  Test Dose Result:  Negative

## 2020-12-08 NOTE — PROGRESS NOTES
Sharyn Dennison   37w3d    Subjective: Pt sleeping between position changes    uterine contractions:yes, pain: .no  nausea/vomiting: .noepigastric pain:.no:      fetal movement: normal, vaginal bleeding: .no    Objective:   Vitals:    12/08/20 0547 12/08/20 0607 12/08/20 0626 12/08/20 0646   BP: 109/56 104/61 107/57 111/59   Pulse: 78 75 75 78   Resp:       Temp:       TempSrc:       SpO2:       Weight:       Height:         Fetal heart variability: moderate  Fetal Heart Rate decelerations: none  Fetal Heart Rate accelerations: yes  Baseline FHR: 145 per minute  Contractions: MVU <200  Cervical Exam 7-8/80/-2  Fetal position: Cephalic  Membranes: ruptured: .yes  AROM: .yes, Meconium: .no  IUPC: .yes, FSE .no    Meds:     Epidural : .yes  Magnesium sulfate: .no  Pitocin: .yes at 8 mu/min    Labs:    Lab:   Recent Results (from the past 48 hour(s))   Hold Blood Bank Specimen (Not Tested)    Collection Time: 12/07/20 12:00 PM   Result Value Ref Range    Holding Tube - Bb DONE    CBC WITH DIFFERENTIAL    Collection Time: 12/07/20 12:00 PM   Result Value Ref Range    WBC 11.0 (H) 4.8 - 10.8 K/uL    RBC 4.66 4.20 - 5.40 M/uL    Hemoglobin 11.7 (L) 12.0 - 16.0 g/dL    Hematocrit 37.4 37.0 - 47.0 %    MCV 80.3 (L) 81.4 - 97.8 fL    MCH 25.1 (L) 27.0 - 33.0 pg    MCHC 31.3 (L) 33.6 - 35.0 g/dL    RDW 40.2 35.9 - 50.0 fL    Platelet Count 326 164 - 446 K/uL    MPV 11.1 9.0 - 12.9 fL    Neutrophils-Polys 72.80 (H) 44.00 - 72.00 %    Lymphocytes 20.70 (L) 22.00 - 41.00 %    Monocytes 4.50 0.00 - 13.40 %    Eosinophils 1.20 0.00 - 6.90 %    Basophils 0.30 0.00 - 1.80 %    Immature Granulocytes 0.50 0.00 - 0.90 %    Nucleated RBC 0.00 /100 WBC    Neutrophils (Absolute) 7.98 (H) 2.00 - 7.15 K/uL    Lymphs (Absolute) 2.27 1.00 - 4.80 K/uL    Monos (Absolute) 0.49 0.00 - 0.85 K/uL    Eos (Absolute) 0.13 0.00 - 0.51 K/uL    Baso (Absolute) 0.03 0.00 - 0.12 K/uL    Immature Granulocytes (abs) 0.05 0.00 - 0.11 K/uL     NRBC (Absolute) 0.00 K/uL       Ass:   37w3d  IOL for ICP  Labor State: Active phase labor. and Inadequate uterine activity - intensity or frequency.  GBS negative    P.   1. Titrate pitocin to maintain adequate MVU  2. Reassess progress 2 hours or PRN

## 2020-12-08 NOTE — CARE PLAN
Problem: Safety  Goal: Will remain free from injury  Outcome: PROGRESSING AS EXPECTED     Problem: Infection  Goal: Will remain free from infection  Outcome: PROGRESSING AS EXPECTED  Note: Pt remains free from s/s of infection     Problem: Knowledge Deficit  Goal: Knowledge of disease process/condition, treatment plan, diagnostic tests, and medications will improve  Outcome: PROGRESSING AS EXPECTED  Note: Plan of care discussed with pt, pt verbalizes understanding     Problem: Pain Management  Goal: Pain level will decrease to patient's comfort goal  Outcome: PROGRESSING AS EXPECTED  Note: IV medications and Epidural given for labor pain management

## 2020-12-08 NOTE — ANESTHESIA TIME REPORT
Anesthesia Start and Stop Event Times     Date Time Event    12/7/2020 2202 Ready for Procedure     2202 Anesthesia Start    12/8/2020 1347 Anesthesia Stop        Responsible Staff  12/07/20 to 12/08/20    Name Role Begin End    Alcon Ventura M.D. Anesth 2202 0706    Cindy Lagunas M.D. Anesth 0706 1347        Preop Diagnosis (Free Text):  Pre-op Diagnosis             Preop Diagnosis (Codes):    Post op Diagnosis  Failure to progress in labor      Premium Reason  A. 3PM - 7AM    Comments:

## 2020-12-08 NOTE — PROGRESS NOTES
0700 Report received from Radhika ANDREWS, Plan of care discussed.     0709 YUNG Vargas CNM at bedside for SVE, No change from previous, 7/80/-2     0900 Dr. Carey at bedside for SVE, 7/70/-2. Plan of care discussed.     1046 Called Dr. Carey to Verify upper limits of pitocin, MD okayed to go past 20, upper limit 30.  1128: dec for c/s made

## 2020-12-08 NOTE — PROGRESS NOTES
"Labor Progress Note:      S:  Pt reports feeling \"tired\" at the moment, but has no other concerns and is comfortable.     Patient Vitals for the past 4 hrs:   BP Temp Temp src Pulse Resp   20 0747 111/60 -- -- 76 --   20 0727 110/59 -- -- 79 --   20 0707 105/75 -- -- 81 --   20 0700 -- 36.5 °C (97.7 °F) Temporal -- 18   20 0646 111/59 -- -- 78 --   20 0626 107/57 -- -- 75 --   20 0607 104/61 -- -- 75 --   20 0547 109/56 -- -- 78 --   20 0530 -- 36.6 °C (97.8 °F) Temporal -- --   20 0527 108/56 -- -- 74 --   20 0508 (!) 98/56 -- -- 75 --     Gen: AAO, NAD    SVE: 7/70/-2 @ 09:00    FHT: baseline 140/ moderate variability/ + accels up to 180/ + late decels x3 recurrent, but resolved.  toco: q2-3min ctx      A/P: 25 y.o.  @ 37w3d by 6wk and 20wk US, here for IOL, due to cholestasis. Anticipating .   S/p balloon placed at 19:15 and removed at 22:20; and AROM w/ clear fluid at 22:31 ().  - labor: first stage active phase, currently on 20 Pitocin  - MV: 200  - FHT: cat II  - GBS: neg  - pain: reposition pt, monitor FHT closely.       OB Attending Addendum:    I have seen and examined Ms. Sharyn Dennison and agree w/ documentation by Dr. Varela.  Pt is a 25 y.o.  @ 37w3d admitted for IOL with IHCP.  EFW on my exam 4000g, difficult secondary to body habitus.  SVE by me, active at 7cm but not well effaced yet.  MVUs just now starting to be adequate.  7cm since 520 this AM, cont to titrate pit.  MVUs cat II, few lates resolved with position change and overall reassuring w/ moderate variability/+ accels.  Discussed w/ pt that if she does need a c/s whether she wants permanent sterilization.  Pt did previously sign a medicaid BTL consent but reports she is concerned about how young she is and does NOT want permanent sterilization at this time.      Cindy Werner MD  [facility], Women's Health      1130 " Addendum:  Continuing to increase pitocin, MVUs mostly inadequate.  SVE without change, head still high and not applied well to cervix.     Discussed lack of change x6hrs; discussed option to continue for additional 1-2hrs vs c/s for arrest of active phase.  Pt prefers to proceed with c/s, reports her other labors were all faster than this.      Pt also requesting removal of tubes w/ c/s.  Reports she does absolutely not desire future pregnancy/children.  I asked her if her children were to die in an accident if there was any chance she would desire future children and she confidently responded no.  Discussed risk of regret as well as risk of a failure (future pregnancy).  I discussed w/ pt risks of surgery including pain, bleeding, infection, risk of damage to intraabdominal structures including bowel/bladder/ureters.  Pt confirms she is accepting of blood transfusion in case of emergency.  Reviewed risks of transfusion including transfusion reaction (fever, damage to heart/lungs/kidneys), risk of exposure to infectious disease including HIV and hepatitis.  All questions answered.    Will proceed with primary c/s w/ bilateral salpingectomy      Cindy Werner MD  [facility], Women's Health

## 2020-12-08 NOTE — PROGRESS NOTES
Sharyn Dennison   37w3d    Subjective: Pt comfortable with epidural in place.  Rotating positions using peanut ball    uterine contractions:yes, pain: .no  nausea/vomiting: .noepigastric pain:.no:      fetal movement: normal, vaginal bleeding: .no    Objective:   Vitals:    12/08/20 0547 12/08/20 0607 12/08/20 0626 12/08/20 0646   BP: 109/56 104/61 107/57 111/59   Pulse: 78 75 75 78   Resp:       Temp:       TempSrc:       SpO2:       Weight:       Height:         Fetal heart variability: moderate  Fetal Heart Rate decelerations: intermittent lates progressing to repetitive lates   Fetal Heart Rate accelerations: yes  Baseline FHR: 145 per minute  Contractions: every 2-3 minutes apart, IUPC placed at 0330  Cervical Exam 7-8/80/-2  Fetal position: Cephalic  Membranes: ruptured: .yes  AROM: .yes, Meconium: .no  IUPC: .yes, FSE .no    Meds:     Epidural : .yes  Magnesium sulfate: .no  Pitocin: .yes     Labs:    Lab:   Recent Results (from the past 48 hour(s))   Hold Blood Bank Specimen (Not Tested)    Collection Time: 12/07/20 12:00 PM   Result Value Ref Range    Holding Tube - Bb DONE    CBC WITH DIFFERENTIAL    Collection Time: 12/07/20 12:00 PM   Result Value Ref Range    WBC 11.0 (H) 4.8 - 10.8 K/uL    RBC 4.66 4.20 - 5.40 M/uL    Hemoglobin 11.7 (L) 12.0 - 16.0 g/dL    Hematocrit 37.4 37.0 - 47.0 %    MCV 80.3 (L) 81.4 - 97.8 fL    MCH 25.1 (L) 27.0 - 33.0 pg    MCHC 31.3 (L) 33.6 - 35.0 g/dL    RDW 40.2 35.9 - 50.0 fL    Platelet Count 326 164 - 446 K/uL    MPV 11.1 9.0 - 12.9 fL    Neutrophils-Polys 72.80 (H) 44.00 - 72.00 %    Lymphocytes 20.70 (L) 22.00 - 41.00 %    Monocytes 4.50 0.00 - 13.40 %    Eosinophils 1.20 0.00 - 6.90 %    Basophils 0.30 0.00 - 1.80 %    Immature Granulocytes 0.50 0.00 - 0.90 %    Nucleated RBC 0.00 /100 WBC    Neutrophils (Absolute) 7.98 (H) 2.00 - 7.15 K/uL    Lymphs (Absolute) 2.27 1.00 - 4.80 K/uL    Monos (Absolute) 0.49 0.00 - 0.85 K/uL    Eos (Absolute) 0.13  0.00 - 0.51 K/uL    Baso (Absolute) 0.03 0.00 - 0.12 K/uL    Immature Granulocytes (abs) 0.05 0.00 - 0.11 K/uL    NRBC (Absolute) 0.00 K/uL       Ass:   37w3d  IOL for ICP  Labor State: Active phase labor. Intrauterine resuscitation done and pitocin off x 30 minutes. Now Category 1 FHT. MVU < 200  GBS negative    P.   1. Titrate pitocin to maintain adequate MVU  2. Reassess 2-3 hours or PRN

## 2020-12-08 NOTE — PROGRESS NOTES
1525: Pt transferred to S342 in stable condition with infant in arms-report given to DARRYL Ward. POC discussed.

## 2020-12-08 NOTE — ANESTHESIA QCDR
2019 [facility] Clinical Data Registry (for Quality Improvement)     Postoperative nausea/vomiting risk protocol (Adult = 18 yrs and Pediatric 3-17 yrs)- (430 and 463)  General inhalation anesthetic (NOT TIVA) with PONV risk factors: No  Provision of anti-emetic therapy with at least 2 different classes of agents: N/A  Patient DID NOT receive anti-emetic therapy and reason is documented in Medical Record: N/A    Multimodal Pain Management- (477)  Non-emergent surgery AND patient age >= 18: No  Use of Multimodal Pain Management, two or more drugs and/or interventions, NOT including systemic opioids:   Exception: Documented allergy to multiple classes of analgesics:     Smoking Abstinence (404)  Patient is current smoker (cigarette, pipe, e-cig, marijuanna): No  Elective Surgery:   Abstinence instructions provided prior to day of surgery:   Patient abstained from smoking on day of surgery:     Pre-Op Beta-Blocker in Isolated CABG (44)  Isolated CABG AND patient age >= 18: No  Beta-blocker admin within 24 hours of surgical incision:   Exception:of medical reason(s) for not administering beta blocker within 24 hours prior to surgical incision (e.g., not  indicated,other medical reason):     PACU assessment of acute postoperative pain prior to Anesthesia Care End- Applies to Patients Age = 18- (ABG7)  Initial PACU pain score is which of the following: < 7/10  Patient unable to report pain score: N/A    Post-anesthetic transfer of care checklist/protocol to PACU/ICU- (426 and 427)  Upon conclusion of case, patient transferred to which of the following locations: PACU/Non-ICU  Use of transfer checklist/protocol: Yes  Exclusion: Service Performed in Patient Hospital Room (and thus did not require transfer): N/A  Unplanned admission to ICU related to anesthesia service up through end of PACU care- (MD51)  Unplanned admission to ICU (not initially anticipated at anesthesia start time): No

## 2020-12-08 NOTE — PROGRESS NOTES
1905 Pt resting in bed, pitocin running. Pt denies u/c's, LOF, or VB, reports +FM. Pt denies pain or cramping at this time  1915 YUNG Vargas CNM at bedside, cooks balloon placed 60 uterine 50 vaginal  2010 Pt starting to feel u/c's and cramping, requesting labor epidural, let pt know that anesthesia is in surgery and we will do her epidural after surgery, let pt know if she needs it she has IV medication available.  2135 LR bolus started for epidural  2201 Dr. Ventura at bedside  2203 Epidural time out  2209 Epidural cath placed  2210 Epidural test dose  2211 Epidural loading dose  2215 Epidural continuous pump started.   2220 Balloon out  2225 YUNG Vargas CNM updated on SVE, balloon out, epidural in.   2230 YUNG Vargas CNM at bedside  2231 AROM clear   0300 YUNG Vargas CNM reviewed FHT.   0330 YUNG Vargas CNM at bedside.   0700 Report to DEMETRIS Phillips RN & FABIAN Arshad RN

## 2020-12-08 NOTE — ANESTHESIA PREPROCEDURE EVALUATION
Relevant Problems   PULMONARY   (+) History of syphilis - tx 2017 per pt      NEURO   (+) History of macrosomia x 2 - 8lb 14oz, 8lb 13.6oz   (+) History of syphilis - tx 2017 per pt         (+) Cholestasis during pregnancy in third trimester - bile acids 10       Physical Exam    Airway   Mallampati: II  TM distance: >3 FB  Neck ROM: full       Cardiovascular - normal exam  Rhythm: regular  Rate: normal  (-) murmur     Dental - normal exam           Pulmonary - normal exam  Breath sounds clear to auscultation     Abdominal    Neurological - normal exam                 Anesthesia Plan    ASA 2       Plan - epidural   Neuraxial block will be labor analgesia              Pertinent diagnostic labs and testing reviewed    Informed Consent:    Anesthetic plan and risks discussed with patient.

## 2020-12-08 NOTE — PROGRESS NOTES
Sharyn Sethminos   37w2d    Subjective: Pt now resting comfortably with epidural in place.  Her CRB fell out around 2230.      uterine contractions:yes, pain: .no  nausea/vomiting: .noepigastric pain:.no:      fetal movement: normal, vaginal bleeding: .no    Objective:   Vitals:    12/07/20 1514 12/07/20 1721 12/07/20 1900 12/07/20 1905   BP: 114/67 120/61  118/66   Pulse: 83 87  77   Resp:   18    Temp:   36.1 °C (96.9 °F)    TempSrc:   Temporal    Weight:       Height:         Fetal heart variability: moderate  Fetal Heart Rate decelerations: variable  Fetal Heart Rate accelerations: yes  Baseline FHR: 150 per minute  Contractions: every 2-3 minutes apart  Cervical Exam 5/80/-2  Fetal position: Cephalic  Membranes: ruptured: .yes  AROM: .yes, Meconium: .no  IUPC: .no, FSE .no    Meds:     Epidural : .yes  Magnesium sulfate: .no  Pitocin: .yes at 18 mu/min    Labs:    Lab:   Recent Results (from the past 48 hour(s))   Hold Blood Bank Specimen (Not Tested)    Collection Time: 12/07/20 12:00 PM   Result Value Ref Range    Holding Tube - Bb DONE    CBC WITH DIFFERENTIAL    Collection Time: 12/07/20 12:00 PM   Result Value Ref Range    WBC 11.0 (H) 4.8 - 10.8 K/uL    RBC 4.66 4.20 - 5.40 M/uL    Hemoglobin 11.7 (L) 12.0 - 16.0 g/dL    Hematocrit 37.4 37.0 - 47.0 %    MCV 80.3 (L) 81.4 - 97.8 fL    MCH 25.1 (L) 27.0 - 33.0 pg    MCHC 31.3 (L) 33.6 - 35.0 g/dL    RDW 40.2 35.9 - 50.0 fL    Platelet Count 326 164 - 446 K/uL    MPV 11.1 9.0 - 12.9 fL    Neutrophils-Polys 72.80 (H) 44.00 - 72.00 %    Lymphocytes 20.70 (L) 22.00 - 41.00 %    Monocytes 4.50 0.00 - 13.40 %    Eosinophils 1.20 0.00 - 6.90 %    Basophils 0.30 0.00 - 1.80 %    Immature Granulocytes 0.50 0.00 - 0.90 %    Nucleated RBC 0.00 /100 WBC    Neutrophils (Absolute) 7.98 (H) 2.00 - 7.15 K/uL    Lymphs (Absolute) 2.27 1.00 - 4.80 K/uL    Monos (Absolute) 0.49 0.00 - 0.85 K/uL    Eos (Absolute) 0.13 0.00 - 0.51 K/uL    Baso (Absolute) 0.03  0.00 - 0.12 K/uL    Immature Granulocytes (abs) 0.05 0.00 - 0.11 K/uL    NRBC (Absolute) 0.00 K/uL       Ass:   37w2d  Labor State: Active phase labor., Adequate uterine activity - intensity and frequency. and Satisfactory labor progress.  GBS negative  Category 2 FHT for presence of occasional variables    P.   1. Anticipate   2. AROM easily for large amounts of clear fluid

## 2020-12-08 NOTE — ANESTHESIA POSTPROCEDURE EVALUATION
Patient: Sharyn Dennison    Procedure Summary     Date: 20 Room / Location: [facility] OR 01 / LABOR AND DELIVERY    Anesthesia Start:  Anesthesia Stop: 20 1347    Procedure:  SECTION, PRIMARY, WITH SALPINGECTOMY (N/A Abdomen) Diagnosis:        delivery delivered      (S/p primary LTCS with bilateral salpingectomy)    Surgeons: Cindy Werner M.D. Responsible Provider: Cindy Lagunas M.D.    Anesthesia Type: epidural ASA Status: 2          Final Anesthesia Type: epidural  Last vitals  BP   Blood Pressure: 131/73    Temp   36.9 °C (98.4 °F)    Pulse   Pulse: 75   Resp   18    SpO2   97 %      Anesthesia Post Evaluation    Patient location during evaluation: PACU  Patient participation: complete - patient participated  Level of consciousness: awake and alert  Pain score: 0    Airway patency: patent  Anesthetic complications: no  Cardiovascular status: hemodynamically stable  Respiratory status: acceptable  Hydration status: euvolemic    PONV: none           Nurse Pain Score: 0 (NPRS)

## 2020-12-09 LAB
BASOPHILS # BLD AUTO: 0.5 % (ref 0–1.8)
BASOPHILS # BLD: 0.06 K/UL (ref 0–0.12)
EOSINOPHIL # BLD AUTO: 0.16 K/UL (ref 0–0.51)
EOSINOPHIL NFR BLD: 1.2 % (ref 0–6.9)
ERYTHROCYTE [DISTWIDTH] IN BLOOD BY AUTOMATED COUNT: 42.3 FL (ref 35.9–50)
HCT VFR BLD AUTO: 27.7 % (ref 37–47)
HGB BLD-MCNC: 8.6 G/DL (ref 12–16)
IMM GRANULOCYTES # BLD AUTO: 0.07 K/UL (ref 0–0.11)
IMM GRANULOCYTES NFR BLD AUTO: 0.5 % (ref 0–0.9)
LYMPHOCYTES # BLD AUTO: 3.16 K/UL (ref 1–4.8)
LYMPHOCYTES NFR BLD: 24.3 % (ref 22–41)
MCH RBC QN AUTO: 25.6 PG (ref 27–33)
MCHC RBC AUTO-ENTMCNC: 31 G/DL (ref 33.6–35)
MCV RBC AUTO: 82.4 FL (ref 81.4–97.8)
MONOCYTES # BLD AUTO: 0.81 K/UL (ref 0–0.85)
MONOCYTES NFR BLD AUTO: 6.2 % (ref 0–13.4)
NEUTROPHILS # BLD AUTO: 8.74 K/UL (ref 2–7.15)
NEUTROPHILS NFR BLD: 67.3 % (ref 44–72)
NRBC # BLD AUTO: 0 K/UL
NRBC BLD-RTO: 0 /100 WBC
PLATELET # BLD AUTO: 268 K/UL (ref 164–446)
PMV BLD AUTO: 11.4 FL (ref 9–12.9)
RBC # BLD AUTO: 3.36 M/UL (ref 4.2–5.4)
WBC # BLD AUTO: 13 K/UL (ref 4.8–10.8)

## 2020-12-09 PROCEDURE — 36415 COLL VENOUS BLD VENIPUNCTURE: CPT

## 2020-12-09 PROCEDURE — A9270 NON-COVERED ITEM OR SERVICE: HCPCS | Performed by: ANESTHESIOLOGY

## 2020-12-09 PROCEDURE — A9270 NON-COVERED ITEM OR SERVICE: HCPCS | Performed by: OBSTETRICS & GYNECOLOGY

## 2020-12-09 PROCEDURE — 700102 HCHG RX REV CODE 250 W/ 637 OVERRIDE(OP): Performed by: OBSTETRICS & GYNECOLOGY

## 2020-12-09 PROCEDURE — 700111 HCHG RX REV CODE 636 W/ 250 OVERRIDE (IP): Performed by: ANESTHESIOLOGY

## 2020-12-09 PROCEDURE — A9270 NON-COVERED ITEM OR SERVICE: HCPCS | Performed by: STUDENT IN AN ORGANIZED HEALTH CARE EDUCATION/TRAINING PROGRAM

## 2020-12-09 PROCEDURE — 85025 COMPLETE CBC W/AUTO DIFF WBC: CPT

## 2020-12-09 PROCEDURE — 700102 HCHG RX REV CODE 250 W/ 637 OVERRIDE(OP): Performed by: STUDENT IN AN ORGANIZED HEALTH CARE EDUCATION/TRAINING PROGRAM

## 2020-12-09 PROCEDURE — 770002 HCHG ROOM/CARE - OB PRIVATE (112)

## 2020-12-09 PROCEDURE — 700102 HCHG RX REV CODE 250 W/ 637 OVERRIDE(OP): Performed by: ANESTHESIOLOGY

## 2020-12-09 RX ORDER — OXYCODONE HYDROCHLORIDE 5 MG/1
10 TABLET ORAL EVERY 4 HOURS PRN
Status: DISCONTINUED | OUTPATIENT
Start: 2020-12-09 | End: 2020-12-12 | Stop reason: HOSPADM

## 2020-12-09 RX ORDER — OXYCODONE HYDROCHLORIDE 5 MG/1
5 TABLET ORAL EVERY 4 HOURS PRN
Status: DISCONTINUED | OUTPATIENT
Start: 2020-12-09 | End: 2020-12-12 | Stop reason: HOSPADM

## 2020-12-09 RX ORDER — KETOROLAC TROMETHAMINE 30 MG/ML
30 INJECTION, SOLUTION INTRAMUSCULAR; INTRAVENOUS EVERY 6 HOURS
Status: DISCONTINUED | OUTPATIENT
Start: 2020-12-09 | End: 2020-12-09

## 2020-12-09 RX ORDER — DIPHENHYDRAMINE HYDROCHLORIDE 50 MG/ML
25 INJECTION INTRAMUSCULAR; INTRAVENOUS EVERY 6 HOURS PRN
Status: DISCONTINUED | OUTPATIENT
Start: 2020-12-09 | End: 2020-12-12 | Stop reason: HOSPADM

## 2020-12-09 RX ORDER — ACETAMINOPHEN 500 MG
1000 TABLET ORAL EVERY 6 HOURS
Status: DISCONTINUED | OUTPATIENT
Start: 2020-12-09 | End: 2020-12-12 | Stop reason: HOSPADM

## 2020-12-09 RX ORDER — DOCUSATE SODIUM 100 MG/1
100 CAPSULE, LIQUID FILLED ORAL 2 TIMES DAILY
Status: DISCONTINUED | OUTPATIENT
Start: 2020-12-09 | End: 2020-12-12 | Stop reason: HOSPADM

## 2020-12-09 RX ORDER — DIPHENHYDRAMINE HCL 25 MG
25 TABLET ORAL EVERY 6 HOURS PRN
Status: DISCONTINUED | OUTPATIENT
Start: 2020-12-09 | End: 2020-12-12 | Stop reason: HOSPADM

## 2020-12-09 RX ORDER — IBUPROFEN 800 MG/1
800 TABLET ORAL 3 TIMES DAILY
Status: DISCONTINUED | OUTPATIENT
Start: 2020-12-09 | End: 2020-12-12 | Stop reason: HOSPADM

## 2020-12-09 RX ADMIN — ACETAMINOPHEN 1000 MG: 500 TABLET ORAL at 11:00

## 2020-12-09 RX ADMIN — IBUPROFEN 800 MG: 800 TABLET, FILM COATED ORAL at 15:04

## 2020-12-09 RX ADMIN — DOCUSATE SODIUM 100 MG: 100 CAPSULE ORAL at 17:11

## 2020-12-09 RX ADMIN — ACETAMINOPHEN 1000 MG: 500 TABLET ORAL at 23:06

## 2020-12-09 RX ADMIN — DOCUSATE SODIUM 100 MG: 100 CAPSULE ORAL at 09:35

## 2020-12-09 RX ADMIN — KETOROLAC TROMETHAMINE 30 MG: 30 INJECTION, SOLUTION INTRAMUSCULAR at 09:35

## 2020-12-09 RX ADMIN — ACETAMINOPHEN 1000 MG: 500 TABLET ORAL at 17:11

## 2020-12-09 RX ADMIN — OXYCODONE HYDROCHLORIDE 5 MG: 5 TABLET ORAL at 22:07

## 2020-12-09 RX ADMIN — PRENATAL WITH FERROUS FUM AND FOLIC ACID 1 TABLET: 3080; 920; 120; 400; 22; 1.84; 3; 20; 10; 1; 12; 200; 27; 25; 2 TABLET ORAL at 09:35

## 2020-12-09 RX ADMIN — IBUPROFEN 800 MG: 800 TABLET, FILM COATED ORAL at 21:23

## 2020-12-09 RX ADMIN — ACETAMINOPHEN 1000 MG: 500 TABLET ORAL at 05:16

## 2020-12-09 RX ADMIN — KETOROLAC TROMETHAMINE 30 MG: 30 INJECTION, SOLUTION INTRAMUSCULAR at 03:12

## 2020-12-09 ASSESSMENT — EDINBURGH POSTNATAL DEPRESSION SCALE (EPDS)
I HAVE FELT SCARED OR PANICKY FOR NO GOOD REASON: NO, NOT AT ALL
THINGS HAVE BEEN GETTING ON TOP OF ME: YES, SOMETIMES I HAVEN'T BEEN COPING AS WELL AS USUAL
I HAVE BLAMED MYSELF UNNECESSARILY WHEN THINGS WENT WRONG: YES, SOME OF THE TIME
I HAVE BEEN ABLE TO LAUGH AND SEE THE FUNNY SIDE OF THINGS: AS MUCH AS I ALWAYS COULD
I HAVE BEEN ANXIOUS OR WORRIED FOR NO GOOD REASON: YES, SOMETIMES
I HAVE LOOKED FORWARD WITH ENJOYMENT TO THINGS: AS MUCH AS I EVER DID
I HAVE BEEN SO UNHAPPY THAT I HAVE HAD DIFFICULTY SLEEPING: NOT AT ALL
I HAVE FELT SAD OR MISERABLE: NO, NOT AT ALL
THE THOUGHT OF HARMING MYSELF HAS OCCURRED TO ME: NEVER
I HAVE BEEN SO UNHAPPY THAT I HAVE BEEN CRYING: NO, NEVER

## 2020-12-09 ASSESSMENT — PAIN DESCRIPTION - PAIN TYPE
TYPE: ACUTE PAIN
TYPE: ACUTE PAIN
TYPE: SURGICAL PAIN

## 2020-12-09 NOTE — CARE PLAN
Problem: Potential for postpartum infection related to surgical incision, compromised uterine condition, urinary tract or respiratory compromise  Goal: Patient will be afebrile and free from signs and symptoms of infection  Outcome: PROGRESSING AS EXPECTED  Note: VSS. No s/s of infection      Problem: Alteration in comfort related to surgical incision and/or after birth pains  Goal: Patient verbalizes acceptable pain level  Outcome: PROGRESSING AS EXPECTED  Note: Denies pain at this time. Encourages non pain med interventions such as ice packs

## 2020-12-09 NOTE — PROGRESS NOTES
"Patient chart flagged due to \"Fall\" this visit. Via flowsheets, a fall was charted on 12/7/2020 at 1900 by YUNG Bundy. No notes from a MD or RN regarding a patient fall during this encounter. No extra precautions put in place in room, no fall risk wristband on patient, no yellow treaded socks on patient, no mention of a fall from report from nightshift RN. Patient denies having a fall this visit. This RN will disregard chart flag, but will continue to monitor patient ambulation for steady gait, and encourage patient to call for assistance if needed.  "

## 2020-12-09 NOTE — NON-PROVIDER
Obstetrics & Gynecology Post-Delivery Progress Note    Date of Service    25 y.o.  s/p  w/ BS for arrest at 7cm  Delivery date: 20    Events  There were no acute events overnight. Pt denies any significant vaginal bleeding other than an occasional clot. She denies fever, chills, headache, dizziness, or any other symptoms at this time.     Subjective  Pain: Yes,  controlled, located primarily in a band-like distribution superior to the umbilicus   Bleeding: lochia minimal  Tolerating PO: yes  Voiding: joe discontinued, awaiting spontaneous void  Ambulating: yes w/ mild pain when standing up  Passing flatus: No  Feeding: breastfeeding well    Objective  24hr VS:  Temp:  [36.3 °C (97.3 °F)-37.2 °C (98.9 °F)] 36.6 °C (97.9 °F)  Pulse:  [67-93] 74  Resp:  [17-19] 18  BP: ()/(48-88) 102/48  SpO2:  [93 %-98 %] 96 %    Physical Exam  General: well  Chest/Breasts: deferred  Abdomen: soft, non-distended, mild periumbilical tenderness to palpation  Fundus: firm and at umbilicus  Incision: dressing clean, dry, intact and healing well  Perineum: deferred  Extremities: no edema, calves nontender    Labs:  CBC without differential   Result Value Ref Range    WBC 19.7 (H) 4.8 - 10.8 K/uL    RBC 3.79 (L) 4.20 - 5.40 M/uL    Hemoglobin 9.4 (L) 12.0 - 16.0 g/dL    Hematocrit 30.8 (L) 37.0 - 47.0 %    MCV 81.3 (L) 81.4 - 97.8 fL    MCH 24.8 (L) 27.0 - 33.0 pg    MCHC 30.5 (L) 33.6 - 35.0 g/dL    RDW 41.3 35.9 - 50.0 fL    Platelet Count 315 164 - 446 K/uL    MPV 11.5 9.0 - 12.9 fL     Medications    •  oxytocin, 2,000 mL/hr, Intravenous, Once    •  acetaminophen, 1,000 mg, Oral, Q6HR    •  ketorolac, 30 mg, Intravenous, Q6HR    •  prenatal plus vitamin, 1 Tab, Oral, Daily-0800      ibuprofen, acetaminophen, acetaminophen, HYDROcodone-acetaminophen, oxyCODONE immediate-release, oxyCODONE immediate release, HYDROmorphone, ePHEDrine, ondansetron, diphenhydrAMINE, naloxone 0.4 mg in 1000 mL infusion,  diphenhydrAMINE **OR** diphenhydrAMINE **OR** naloxone 0.4 mg in 1000 mL infusion, LR, docusate sodium, bisacodyl, simethicone    Assessment/Plan  Sharyn Dennison is a 25 y.o.yo  s/p postop day #1 s/p  for failure to progress.    - Post care: meeting all goals  - Pain: controlled w/ acetaminophen and ketorolac   - Rh+, Rubella Immune  - Method of Feeding: plans to breastfeed  - Method of Contraception: BS yesterday   VTE prophylaxis: SCDs    - Disposition: postop day 1, likely home tomorrow afternoon
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
## 2020-12-09 NOTE — CONSULTS
Met with MOB for a lactation visit.  MOB delivered her fifth baby yesterday, 12/08/20 at 1252 at 37.3 weeks gestation.  Risk factors for breastfeeding are: increased BMI of 45.68.  MOB stated she has previous breastfeeding experience and denied having any lactation concerns at this.  MOB denied pain and tissue damage to her breasts with latch.  Infant's weight loss to date and voiding/stooling patten remains within defined limits.    SANTIAGO stated she has WIC and is seen at the office on [address] in [place], [place].  MOB stated she is aware of the outpatient lactation assistance available to her through [facility] and has already been contacted by her lactation peer counselor.    Breastfeeding Plan:  MOB was encouraged to feed infant per feeding cues for a minimum of 8 or more feeds in a 24 hour period.    MOB verbalized understanding of all information provided to her and denied having any lactation questions at this time.  Encouraged MOB to call for lactation assistance as needed.

## 2020-12-09 NOTE — PROGRESS NOTES
Obstetrics & Gynecology Post-Delivery Progress Note    Date of Service      25 y.o.  s/p  for failure to progress  Delivery date: 20    Events  No events    Subjective  Pain: Yes,  controlled  Bleeding: lochia minimal  Tolerating PO: yes  Voiding: joe discontinued, awaiting spontaneous void  Ambulating: yes  Passing flatus: No  Feeding: breastfeeding well    Objective  24hr VS:  Temp:  [36.3 °C (97.3 °F)-37.2 °C (98.9 °F)] 36.6 °C (97.9 °F)  Pulse:  [67-93] 74  Resp:  [17-19] 18  BP: ()/(48-88) 102/48  SpO2:  [93 %-98 %] 96 %    Physical Exam  General: well  Chest/Breasts: breasts soft   Abdomen: normal bowel sounds  Fundus: below umbilicus  Incision: dressing clean, dry, intact  Perineum: deferred  Extremities: symmetric, calves nontender    Labs:  Lab Results   Component Value Date/Time    WBC 19.7 (H) 2020 10:39 PM    RBC 3.79 (L) 2020 10:39 PM    HEMOGLOBIN 9.4 (L) 2020 10:39 PM    HEMATOCRIT 30.8 (L) 2020 10:39 PM    MCV 81.3 (L) 2020 10:39 PM    MCH 24.8 (L) 2020 10:39 PM    MCHC 30.5 (L) 2020 10:39 PM    MPV 11.5 2020 10:39 PM    NEUTSPOLYS 72.80 (H) 2020 12:00 PM    LYMPHOCYTES 20.70 (L) 2020 12:00 PM    MONOCYTES 4.50 2020 12:00 PM    EOSINOPHILS 1.20 2020 12:00 PM    BASOPHILS 0.30 2020 12:00 PM    HYPOCHROMIA 1+ 2013 12:13 PM        Medications    •  docusate sodium, 100 mg, Oral, BID    •  oxytocin, 2,000 mL/hr, Intravenous, Once    •  acetaminophen, 1,000 mg, Oral, Q6HR    •  ketorolac, 30 mg, Intravenous, Q6HR    •  prenatal plus vitamin, 1 Tab, Oral, Daily-0800      ibuprofen, acetaminophen, acetaminophen, HYDROcodone-acetaminophen, oxyCODONE immediate-release, oxyCODONE immediate release, HYDROmorphone, ePHEDrine, ondansetron, diphenhydrAMINE, naloxone 0.4 mg in 1000 mL infusion, diphenhydrAMINE **OR** diphenhydrAMINE **OR** naloxone 0.4 mg in 1000 mL infusion, LR, bisacodyl,  simethicone      Assessment/Plan  Sharyn Dennison is a 25 y.o.yo  s/p postop day #1  s/p  for failure to progress    - Post care: meeting all goals  - Encourage safe ambulation; monitor for first void  - Pain: controlled  - Rh+, Rubella Immune  - Method of Feeding: plans to breastfeed  - Method of Contraception: tubal ligation (already done)  VTE prophylaxis: SCDs    - Disposition: likely home postop day 2

## 2020-12-09 NOTE — PROGRESS NOTES
2000 Assessment completed, fundus firm, lochia light, ABD incision with Dermabond dressing intact, POC reviewed, verbalized understanding. Denies pain at this time, will call if pain med intervention needed.     2030 MOB requested 1 bottle of Similac to feed the infant, so she can get some rest.

## 2020-12-09 NOTE — PROGRESS NOTES
Bedside report received from El RN @ 5560. Verified baby's wrist band with the patient. Cuddle is active. Oriented patient to the room, introduced the call light. Discussed plan of care. Assessment done. Denies pain. Encouraged to call if with need. Will check at intervals.

## 2020-12-10 PROCEDURE — 700102 HCHG RX REV CODE 250 W/ 637 OVERRIDE(OP): Performed by: OBSTETRICS & GYNECOLOGY

## 2020-12-10 PROCEDURE — A9270 NON-COVERED ITEM OR SERVICE: HCPCS | Performed by: OBSTETRICS & GYNECOLOGY

## 2020-12-10 PROCEDURE — 770002 HCHG ROOM/CARE - OB PRIVATE (112)

## 2020-12-10 PROCEDURE — 700102 HCHG RX REV CODE 250 W/ 637 OVERRIDE(OP): Performed by: STUDENT IN AN ORGANIZED HEALTH CARE EDUCATION/TRAINING PROGRAM

## 2020-12-10 PROCEDURE — A9270 NON-COVERED ITEM OR SERVICE: HCPCS | Performed by: STUDENT IN AN ORGANIZED HEALTH CARE EDUCATION/TRAINING PROGRAM

## 2020-12-10 RX ADMIN — ACETAMINOPHEN 1000 MG: 500 TABLET ORAL at 11:33

## 2020-12-10 RX ADMIN — DOCUSATE SODIUM 100 MG: 100 CAPSULE ORAL at 05:38

## 2020-12-10 RX ADMIN — DOCUSATE SODIUM 100 MG: 100 CAPSULE ORAL at 17:13

## 2020-12-10 RX ADMIN — IBUPROFEN 800 MG: 800 TABLET, FILM COATED ORAL at 15:17

## 2020-12-10 RX ADMIN — IBUPROFEN 800 MG: 800 TABLET, FILM COATED ORAL at 08:59

## 2020-12-10 RX ADMIN — ACETAMINOPHEN 1000 MG: 500 TABLET ORAL at 05:38

## 2020-12-10 RX ADMIN — ACETAMINOPHEN 1000 MG: 500 TABLET ORAL at 22:50

## 2020-12-10 RX ADMIN — PRENATAL WITH FERROUS FUM AND FOLIC ACID 1 TABLET: 3080; 920; 120; 400; 22; 1.84; 3; 20; 10; 1; 12; 200; 27; 25; 2 TABLET ORAL at 08:59

## 2020-12-10 RX ADMIN — OXYCODONE HYDROCHLORIDE 5 MG: 5 TABLET ORAL at 03:05

## 2020-12-10 RX ADMIN — IBUPROFEN 800 MG: 800 TABLET, FILM COATED ORAL at 20:24

## 2020-12-10 RX ADMIN — ACETAMINOPHEN 1000 MG: 500 TABLET ORAL at 17:13

## 2020-12-10 ASSESSMENT — PAIN DESCRIPTION - PAIN TYPE
TYPE: SURGICAL PAIN
TYPE: ACUTE PAIN;SURGICAL PAIN
TYPE: SURGICAL PAIN
TYPE: ACUTE PAIN;SURGICAL PAIN
TYPE: ACUTE PAIN
TYPE: ACUTE PAIN;SURGICAL PAIN
TYPE: ACUTE PAIN;SURGICAL PAIN

## 2020-12-10 NOTE — PROGRESS NOTES
Obstetrics & Gynecology Post-Delivery Progress Note    Date of Service      25 y.o.  s/p  for failure to progress  Delivery date: 20    Events  Pt reported difficulty ambulating yesterday evening 2/2 pain, which is now controlled. She also reports mild dizziness when ambulating.     Subjective  Pain: Yes,  controlled  Bleeding: lochia minimal  Tolerating PO: yes  Voiding: without difficulty  Ambulating: yes  Passing flatus: No  Feeding: breastfeeding well    Objective  24hr VS:  Temp:  [36.3 °C (97.3 °F)-37 °C (98.6 °F)] 36.4 °C (97.6 °F)  Pulse:  [76-86] 86  Resp:  [16-19] 16  BP: ()/(58-71) 99/58  SpO2:  [94 %-98 %] 94 %    Physical Exam  General: well  Chest/Breasts: breasts soft   Abdomen: soft  Fundus: below umbilicus  Incision: dressing clean, dry, intact  Perineum: deferred  Extremities: symmetric, calves nontender    Labs:  Lab Results   Component Value Date/Time    WBC 13.0 (H) 2020 06:08 PM    RBC 3.36 (L) 2020 06:08 PM    HEMOGLOBIN 8.6 (L) 2020 06:08 PM    HEMATOCRIT 27.7 (L) 2020 06:08 PM    MCV 82.4 2020 06:08 PM    MCH 25.6 (L) 2020 06:08 PM    MCHC 31.0 (L) 2020 06:08 PM    MPV 11.4 2020 06:08 PM    NEUTSPOLYS 67.30 2020 06:08 PM    LYMPHOCYTES 24.30 2020 06:08 PM    MONOCYTES 6.20 2020 06:08 PM    EOSINOPHILS 1.20 2020 06:08 PM    BASOPHILS 0.50 2020 06:08 PM    HYPOCHROMIA 1+ 2013 12:13 PM        Medications    •  ibuprofen, 800 mg, Oral, TID    •  acetaminophen, 1,000 mg, Oral, Q6HRS    •  docusate sodium, 100 mg, Oral, BID    •  prenatal plus vitamin, 1 Tab, Oral, Daily-0800      oxyCODONE immediate-release, oxyCODONE immediate release, diphenhydrAMINE **OR** diphenhydrAMINE, ibuprofen, acetaminophen, acetaminophen, HYDROcodone-acetaminophen, LR, bisacodyl, simethicone      Assessment/Plan  Sharyn Dennison is a 25 y.o.yo  s/p postop day #2  s/p  for  failure to progress    - Post care: meeting all goals  - Monitor for symptomatic anemia; recommend ambulation with appropriate assistance.  - Pain: controlled  - Rh+, Rubella Immune  - Method of Feeding: plans to breastfeed  - Method of Contraception: TBD  VTE prophylaxis: none indicated    - Disposition: likely home postop day 3. Pt still expressing significant pain, difficulty ambulating, and needs continued inpatient recovery today.

## 2020-12-10 NOTE — CARE PLAN
Problem: Altered physiologic condition related to postoperative  delivery  Goal: Patient physiologically stable as evidenced by normal lochia, palpable uterine involution and vital signs within normal limits  Outcome: PROGRESSING AS EXPECTED  Note: Fundus firm at U, lochia rubra minimal. Surgical incision with dermabond CDI. VSS     Problem: Potential for postpartum infection related to surgical incision, compromised uterine condition, urinary tract or respiratory compromise  Goal: Patient will be afebrile and free from signs and symptoms of infection  Outcome: PROGRESSING AS EXPECTED  Note: Patient has no S/S of infection noted at this time. VSS, afebrile

## 2020-12-10 NOTE — NON-PROVIDER
Obstetrics & Gynecology Post-Delivery Progress Note    Date of Service    25 y.o.  s/p  w/ BS for arrest at 7cm  Delivery date: 20    Events  There were no acute events overnight. Pt reports moderate suprapubic and periumbilical pain when attempting to get out of bed and walk around. She has mild vaginal bleeding and cramping in her calves, but otherwise denies fever, chills, headache, or any other symptoms at this time.     Subjective  Pain: Yes, located in periumbilical and suprapubic region, controlled by Norco  Bleeding: lochia minimal  Tolerating PO: yes  Voiding: without difficulty  Ambulating: Yes, but has mild pain when getting out of bed and walking around for extended periods of time  Passing flatus: No  Feeding: breastfeeding well    Objective  24hr VS:  Temp:  [36.3 °C (97.3 °F)-37 °C (98.6 °F)] 36.4 °C (97.6 °F)  Pulse:  [76-86] 86  Resp:  [16-19] 16  BP: ()/(58-71) 99/58  SpO2:  [94 %-98 %] 94 %    Physical Exam  General: well  Chest/Breasts: nipples intact and breasts engorged   Abdomen: obese, soft, non-distended w/ periumbilical and suprapubic tenderness  Fundus: firm and below umbilicus  Incision: healing well and no significant drainage  Perineum: deferred  Extremities: 1+ edema, calves mildly tender to palpation    Labs: No new labs for 12/10/20    Medications    •  ibuprofen, 800 mg, Oral, TID    •  acetaminophen, 1,000 mg, Oral, Q6HRS    •  docusate sodium, 100 mg, Oral, BID    •  prenatal plus vitamin, 1 Tab, Oral, Daily-0800      oxyCODONE immediate-release, oxyCODONE immediate release, diphenhydrAMINE **OR** diphenhydrAMINE, ibuprofen, acetaminophen, acetaminophen, HYDROcodone-acetaminophen, LR, bisacodyl, simethicone    Assessment/Plan  Sharyn Dennison is a 25 y.o.yo  s/p postop day #2 s/p  for failure to progress    - Post care: meeting most goals, however patient encouraged to ambulate more  - Pain: controlled by Tylenol, Motrin,  and Norco  - Rh+, Rubella Immune  - Method of Feeding: plans to breastfeed  - Method of Contraception: BS on 12/08  VTE prophylaxis: SCDs    - Disposition: likely home postop day 3, pt needs continued inpatient recovery today due to pain

## 2020-12-10 NOTE — LACTATION NOTE
This note was copied from a baby's chart.  Follow up visit. Per mother, she has been latching, but is also supplementing with formula, due to pain issues. Offered latch assistance, she declined. Mother is aware to offer breast then bottle. Provided supplementation guidelines, and explained.     Plan is to breastfeed with cues, no more than 4 hours from last feeding, at least 8 or more feedings in a 24 hour period, and mother to supplement with formula as desired.    Mother has [facility], and she will call for outpatient lactation support as needed.    Encouraged mother to call for lactation support as she needs.

## 2020-12-11 LAB
ERYTHROCYTE [DISTWIDTH] IN BLOOD BY AUTOMATED COUNT: 42.2 FL (ref 35.9–50)
HCT VFR BLD AUTO: 31.6 % (ref 37–47)
HGB BLD-MCNC: 9.5 G/DL (ref 12–16)
MCH RBC QN AUTO: 24.6 PG (ref 27–33)
MCHC RBC AUTO-ENTMCNC: 30.1 G/DL (ref 33.6–35)
MCV RBC AUTO: 81.9 FL (ref 81.4–97.8)
PLATELET # BLD AUTO: 300 K/UL (ref 164–446)
PMV BLD AUTO: 11.1 FL (ref 9–12.9)
RBC # BLD AUTO: 3.86 M/UL (ref 4.2–5.4)
WBC # BLD AUTO: 11.2 K/UL (ref 4.8–10.8)

## 2020-12-11 PROCEDURE — 700102 HCHG RX REV CODE 250 W/ 637 OVERRIDE(OP): Performed by: OBSTETRICS & GYNECOLOGY

## 2020-12-11 PROCEDURE — 770002 HCHG ROOM/CARE - OB PRIVATE (112)

## 2020-12-11 PROCEDURE — 85027 COMPLETE CBC AUTOMATED: CPT

## 2020-12-11 PROCEDURE — A9270 NON-COVERED ITEM OR SERVICE: HCPCS | Performed by: OBSTETRICS & GYNECOLOGY

## 2020-12-11 PROCEDURE — 700102 HCHG RX REV CODE 250 W/ 637 OVERRIDE(OP): Performed by: STUDENT IN AN ORGANIZED HEALTH CARE EDUCATION/TRAINING PROGRAM

## 2020-12-11 PROCEDURE — 36415 COLL VENOUS BLD VENIPUNCTURE: CPT

## 2020-12-11 PROCEDURE — A9270 NON-COVERED ITEM OR SERVICE: HCPCS | Performed by: STUDENT IN AN ORGANIZED HEALTH CARE EDUCATION/TRAINING PROGRAM

## 2020-12-11 RX ADMIN — DOCUSATE SODIUM 100 MG: 100 CAPSULE ORAL at 04:38

## 2020-12-11 RX ADMIN — IBUPROFEN 800 MG: 800 TABLET, FILM COATED ORAL at 07:53

## 2020-12-11 RX ADMIN — ACETAMINOPHEN 1000 MG: 500 TABLET ORAL at 23:18

## 2020-12-11 RX ADMIN — ACETAMINOPHEN 1000 MG: 500 TABLET ORAL at 10:58

## 2020-12-11 RX ADMIN — DOCUSATE SODIUM 100 MG: 100 CAPSULE ORAL at 17:38

## 2020-12-11 RX ADMIN — IBUPROFEN 800 MG: 800 TABLET, FILM COATED ORAL at 14:30

## 2020-12-11 RX ADMIN — IBUPROFEN 800 MG: 800 TABLET, FILM COATED ORAL at 20:44

## 2020-12-11 RX ADMIN — ACETAMINOPHEN 1000 MG: 500 TABLET ORAL at 17:38

## 2020-12-11 RX ADMIN — ACETAMINOPHEN 1000 MG: 500 TABLET ORAL at 05:26

## 2020-12-11 RX ADMIN — PRENATAL WITH FERROUS FUM AND FOLIC ACID 1 TABLET: 3080; 920; 120; 400; 22; 1.84; 3; 20; 10; 1; 12; 200; 27; 25; 2 TABLET ORAL at 07:53

## 2020-12-11 RX ADMIN — OXYCODONE HYDROCHLORIDE 10 MG: 5 TABLET ORAL at 04:36

## 2020-12-11 ASSESSMENT — PAIN DESCRIPTION - PAIN TYPE
TYPE: ACUTE PAIN;SURGICAL PAIN
TYPE: ACUTE PAIN
TYPE: ACUTE PAIN;SURGICAL PAIN

## 2020-12-11 NOTE — PROGRESS NOTES
Obstetrics & Gynecology Post-Delivery Progress Note    Date of Service      25 y.o.  s/p  for failure to progress  Delivery date: 20    Events  Pt reports continued pain that is better controlled today.    Subjective  Pain: Yes,  controlled  Bleeding: lochia minimal  Tolerating PO: yes  Voiding: without difficulty  Ambulating: yes  Passing flatus: Yes  Feeding: breastfeeding well    Objective  24hr VS:  Temp:  [36.5 °C (97.7 °F)-36.8 °C (98.2 °F)] 36.5 °C (97.7 °F)  Pulse:  [71-78] 71  Resp:  [16-17] 16  BP: (105-127)/(70-78) 127/78  SpO2:  [97 %] 97 %    Physical Exam  General: well, NAD  Chest/Breasts: breasts soft   Abdomen: normal bowel sounds  Fundus: below umbilicus  Incision: dressing clean, dry, intact  Perineum: deferred  Extremities: symmetric, calves nontender    Labs:  Lab Results   Component Value Date/Time    WBC 13.0 (H) 2020 06:08 PM    RBC 3.36 (L) 2020 06:08 PM    HEMOGLOBIN 8.6 (L) 2020 06:08 PM    HEMATOCRIT 27.7 (L) 2020 06:08 PM    MCV 82.4 2020 06:08 PM    MCH 25.6 (L) 2020 06:08 PM    MCHC 31.0 (L) 2020 06:08 PM    MPV 11.4 2020 06:08 PM    NEUTSPOLYS 67.30 2020 06:08 PM    LYMPHOCYTES 24.30 2020 06:08 PM    MONOCYTES 6.20 2020 06:08 PM    EOSINOPHILS 1.20 2020 06:08 PM    BASOPHILS 0.50 2020 06:08 PM    HYPOCHROMIA 1+ 2013 12:13 PM        Medications    •  ibuprofen, 800 mg, Oral, TID    •  acetaminophen, 1,000 mg, Oral, Q6HRS    •  docusate sodium, 100 mg, Oral, BID    •  prenatal plus vitamin, 1 Tab, Oral, Daily-0800      oxyCODONE immediate-release, oxyCODONE immediate release, diphenhydrAMINE **OR** diphenhydrAMINE, ibuprofen, acetaminophen, acetaminophen, HYDROcodone-acetaminophen, LR, bisacodyl, simethicone      Assessment/Plan  Sharyn Dennison is a 25 y.o.yo  s/p postop day #3  s/p  for failure to progress    - Post care: meeting all goals  - CBC  ordered and pending; consider transfusion if Hgb <7.0  - Abdominal binder   - Pain: controlled  - Rh+, Rubella Immune  - Method of Feeding: plans to breastfeed  - Method of Contraception: TBD  VTE prophylaxis: none indicated    - Disposition: likely home postop day 4. Pt may benefit from additional inpt support and recovery.

## 2020-12-11 NOTE — LACTATION NOTE
This note was copied from a baby's chart.  Follow up visit. Mother states she is still offering breast before bottle. She also states she is feeling like her milk is coming in. Did assessment, breasts are are soft, but mother reports heaviness. Reviewed engorgement management. Offered assistance with positioning due to post-op pain, she declined, and states she is comfortable with football hold. She has no other lactation concerns, encouraged to call for support.    Continue feeding plan as discussed yesterday.    [facility] for outpatient follow up as needed.

## 2020-12-11 NOTE — CARE PLAN
Problem: Alteration in comfort related to surgical incision and/or after birth pains  Goal: Patient verbalizes acceptable pain level  Outcome: PROGRESSING AS EXPECTED  Intervention: Assess effectiveness of pain meds within 1 hour of administration  Note: Pt reports adequate pain relief with tylenol and ibuprofen at this time, verbalizes understanding of pain medication options available, will call RN if other pain medication needed     Problem: Potential anxiety related to difficulty adapting to parental role  Goal: Patient will verbalize and demonstrate effective bonding and parenting behavior  Outcome: PROGRESSING AS EXPECTED  Intervention: Assess patient for anxiety or apprehension regarding parenting role  Note: Pt demonstrates appropriate understanding of  care, responsive to infant cues, good bonding observed, no concerns at this time

## 2020-12-12 ENCOUNTER — APPOINTMENT (OUTPATIENT)
Dept: RADIOLOGY | Facility: MEDICAL CENTER | Age: 25
End: 2020-12-12
Attending: STUDENT IN AN ORGANIZED HEALTH CARE EDUCATION/TRAINING PROGRAM
Payer: MEDICAID

## 2020-12-12 VITALS
HEIGHT: 66 IN | RESPIRATION RATE: 18 BRPM | TEMPERATURE: 97.8 F | BODY MASS INDEX: 45.48 KG/M2 | DIASTOLIC BLOOD PRESSURE: 76 MMHG | WEIGHT: 283 LBS | OXYGEN SATURATION: 94 % | HEART RATE: 94 BPM | SYSTOLIC BLOOD PRESSURE: 118 MMHG

## 2020-12-12 PROCEDURE — A9270 NON-COVERED ITEM OR SERVICE: HCPCS | Performed by: OBSTETRICS & GYNECOLOGY

## 2020-12-12 PROCEDURE — 700102 HCHG RX REV CODE 250 W/ 637 OVERRIDE(OP): Performed by: OBSTETRICS & GYNECOLOGY

## 2020-12-12 PROCEDURE — 700102 HCHG RX REV CODE 250 W/ 637 OVERRIDE(OP): Performed by: STUDENT IN AN ORGANIZED HEALTH CARE EDUCATION/TRAINING PROGRAM

## 2020-12-12 PROCEDURE — A9270 NON-COVERED ITEM OR SERVICE: HCPCS | Performed by: STUDENT IN AN ORGANIZED HEALTH CARE EDUCATION/TRAINING PROGRAM

## 2020-12-12 PROCEDURE — 93970 EXTREMITY STUDY: CPT

## 2020-12-12 RX ORDER — IBUPROFEN 800 MG/1
800 TABLET ORAL 3 TIMES DAILY
Qty: 30 TAB | Refills: 0 | Status: SHIPPED | OUTPATIENT
Start: 2020-12-12 | End: 2020-12-22

## 2020-12-12 RX ORDER — PSEUDOEPHEDRINE HCL 30 MG
100 TABLET ORAL 2 TIMES DAILY
Qty: 60 CAP | Refills: 0 | Status: SHIPPED | OUTPATIENT
Start: 2020-12-12 | End: 2021-01-11

## 2020-12-12 RX ORDER — OXYCODONE HYDROCHLORIDE 5 MG/1
5 TABLET ORAL EVERY 4 HOURS PRN
Qty: 15 TAB | Refills: 0 | Status: SHIPPED | OUTPATIENT
Start: 2020-12-12 | End: 2020-12-17

## 2020-12-12 RX ADMIN — ACETAMINOPHEN 1000 MG: 500 TABLET ORAL at 05:31

## 2020-12-12 RX ADMIN — DOCUSATE SODIUM 100 MG: 100 CAPSULE ORAL at 05:31

## 2020-12-12 RX ADMIN — PRENATAL WITH FERROUS FUM AND FOLIC ACID 1 TABLET: 3080; 920; 120; 400; 22; 1.84; 3; 20; 10; 1; 12; 200; 27; 25; 2 TABLET ORAL at 09:25

## 2020-12-12 RX ADMIN — IBUPROFEN 800 MG: 800 TABLET, FILM COATED ORAL at 09:25

## 2020-12-12 ASSESSMENT — PAIN DESCRIPTION - PAIN TYPE
TYPE: ACUTE PAIN;SURGICAL PAIN
TYPE: ACUTE PAIN
TYPE: ACUTE PAIN;SURGICAL PAIN
TYPE: ACUTE PAIN;SURGICAL PAIN

## 2020-12-12 NOTE — PROGRESS NOTES
Discharge instructions given. All questions answered. Patient verbalized understanding of when to follow up and reasons to call the doctor. Bands checked. Pt discharged via wheelchair.

## 2020-12-12 NOTE — CARE PLAN
Problem: Potential for postpartum infection related to surgical incision, compromised uterine condition, urinary tract or respiratory compromise  Goal: Patient will be afebrile and free from signs and symptoms of infection  Outcome: PROGRESSING AS EXPECTED  Intervention: Monitor vital signs and assess patient as directed in Intrapartum/Postpartum Standard of Care in Policy and Procedure manual  Note: Incision site without redness or drainage, no s/s infection noted at this time, remains afebrile     Problem: Alteration in comfort related to surgical incision and/or after birth pains  Goal: Patient is able to ambulate, care for self and infant with acceptable pain level  Outcome: PROGRESSING AS EXPECTED  Intervention: Administer pain meds as requested by patient and ordered by MD//CNM  Note: Pt caring for self and infant without difficulty, tylenol and ibuprofen being given around the clock as per order, pt verbalizes understanding of other pain medication options available

## 2020-12-12 NOTE — NON-PROVIDER
Obstetrics & Gynecology Post-Delivery Progress Note    Date of Service    25 y.o.  s/p  for failure to progress  Delivery date: 20    Events  Pt's pain is slowly improving and she feels ready to return home. Her first BM since the  occurred last night following 2 days of stool softeners.     Subjective  Pain: Yes (periumbilical region), controlled  Bleeding: lochia minimal  Tolerating PO: yes  Voiding: without difficulty  Ambulating: yes  Passing flatus: Yes  Feeding: breastfeeding well    Objective  24hr VS:  Temp:  [36.6 °C (97.8 °F)-36.9 °C (98.5 °F)] 36.6 °C (97.8 °F)  Pulse:  [80-94] 94  Resp:  [17-18] 18  BP: (107-118)/(68-76) 118/76  SpO2:  [94 %-97 %] 94 %    Physical Exam  General: well  Chest/Breasts: nipples intact and breasts engorged   Abdomen: periumbilical tenderness, normal bowel sounds, soft, non-distended  Fundus: below umbilicus  Incision: dressing clean, dry, intact and healing well  Perineum: deferred  Extremities: no edema, calves nontender    Labs:  Recent Labs     20  1808 20  0737   WBC 13.0* 11.2*   RBC 3.36* 3.86*   HEMOGLOBIN 8.6* 9.5*   HEMATOCRIT 27.7* 31.6*   MCV 82.4 81.9   MCH 25.6* 24.6*   RDW 42.3 42.2   PLATELETCT 268 300   MPV 11.4 11.1   NEUTSPOLYS 67.30  --    LYMPHOCYTES 24.30  --    MONOCYTES 6.20  --    EOSINOPHILS 1.20  --    BASOPHILS 0.50  --      Medications    •  ibuprofen, 800 mg, Oral, TID    •  acetaminophen, 1,000 mg, Oral, Q6HRS    •  docusate sodium, 100 mg, Oral, BID    •  prenatal plus vitamin, 1 Tab, Oral, Daily-0800      oxyCODONE immediate-release, oxyCODONE immediate release, diphenhydrAMINE **OR** diphenhydrAMINE, ibuprofen, acetaminophen, acetaminophen, HYDROcodone-acetaminophen, LR, bisacodyl, simethicone    Assessment/Plan  Sharyn Dennison is a 25 y.o.yo  s/p postop day #4  s/p  for failure to progress    - Post care: meeting all goals  - Pain: controlled w/ addition of abdominal  binder  - Rh+, Rubella Immune  - Method of Feeding: plans to breastfeed  - Method of Contraception: BS 12/08/20  VTE prophylaxis: none indicated    - Disposition: likely home postop day 4

## 2020-12-12 NOTE — LACTATION NOTE
followup visit. MOB reports baby is latching well, she denies any nipple pain or breakdown. Baby asleep at this time. MOB reports her breasts are beginning to feel heavier today. I reviewed engorgement and how to manage it with frequent feedings, hand expression, pumping and warm packs prior to feeding/pumping. Discussed normal feeding frequency of  in first 6-8 weeks, including growth spurt behaviors. Discussed stool changes to expect by day 5. MOB reports baby's stools are already greenish/yellow and loose. SANTIAGO is a client of [facility] and I encouraged her to followup with them post d/c for any breastfeeding concerns.MOB is doing a combination of breast and bottle feeding.   Plan: feed on cue but at least 8 times every 24 hours.

## 2020-12-12 NOTE — PROGRESS NOTES
Obstetrics & Gynecology Post-Delivery Progress Note    Date of Service      25 y.o.  s/p  for failure to progress  Delivery date: 20    Events  No events overnight reported    Subjective  Pain: Yes,  controlled  Bleeding: lochia minimal  Tolerating PO: yes  Voiding: without difficulty  Ambulating: yes  Passing flatus: Yes  Feeding: breastfeeding well    Objective  24hr VS:  Temp:  [36.6 °C (97.8 °F)-36.9 °C (98.5 °F)] 36.6 °C (97.8 °F)  Pulse:  [80-94] 94  Resp:  [17-18] 18  BP: (107-118)/(68-76) 118/76  SpO2:  [94 %-97 %] 94 %    Physical Exam  General: well  Chest/Breasts: breasts soft   Abdomen: normal bowel sounds  Fundus: below umbilicus  Incision: dressing clean, dry, intact  Perineum: deferred  Extremities: no edema, positive Homans on RIGHT calf    Labs:  Lab Results   Component Value Date/Time    WBC 11.2 (H) 2020 07:37 AM    RBC 3.86 (L) 2020 07:37 AM    HEMOGLOBIN 9.5 (L) 2020 07:37 AM    HEMATOCRIT 31.6 (L) 2020 07:37 AM    MCV 81.9 2020 07:37 AM    MCH 24.6 (L) 2020 07:37 AM    MCHC 30.1 (L) 2020 07:37 AM    MPV 11.1 2020 07:37 AM    NEUTSPOLYS 67.30 2020 06:08 PM    LYMPHOCYTES 24.30 2020 06:08 PM    MONOCYTES 6.20 2020 06:08 PM    EOSINOPHILS 1.20 2020 06:08 PM    BASOPHILS 0.50 2020 06:08 PM    HYPOCHROMIA 1+ 2013 12:13 PM        Medications    •  ibuprofen, 800 mg, Oral, TID    •  acetaminophen, 1,000 mg, Oral, Q6HRS    •  docusate sodium, 100 mg, Oral, BID    •  prenatal plus vitamin, 1 Tab, Oral, Daily-0800      oxyCODONE immediate-release, oxyCODONE immediate release, diphenhydrAMINE **OR** diphenhydrAMINE, ibuprofen, acetaminophen, acetaminophen, HYDROcodone-acetaminophen, LR, bisacodyl, simethicone      Assessment/Plan  Sharyn Dennison is a 25 y.o.yo  s/p postop day #4  s/p  for failure to progress    - Post care: meeting all goals  - LE US pending for DVT  r/o  - Pain: controlled  - Rh+, Rubella Immune  - Method of Feeding: plans to breastfeed  - Method of Contraception: abstinence  VTE prophylaxis: none indicated    - Disposition: likely home today pending DVT r/o.

## 2020-12-12 NOTE — PROGRESS NOTES
0700 Received bedside report from DARRYL Raman. Discussed plan of care. Patient will call for pain medication as needed. All needs met at this time.

## 2020-12-22 ENCOUNTER — POST PARTUM (OUTPATIENT)
Dept: OBGYN | Facility: CLINIC | Age: 25
End: 2020-12-22

## 2020-12-22 VITALS — DIASTOLIC BLOOD PRESSURE: 68 MMHG | SYSTOLIC BLOOD PRESSURE: 112 MMHG | WEIGHT: 272.8 LBS | BODY MASS INDEX: 44.03 KG/M2

## 2020-12-22 DIAGNOSIS — Z09 POSTOP CHECK: ICD-10-CM

## 2020-12-22 PROCEDURE — 99024 POSTOP FOLLOW-UP VISIT: CPT | Performed by: OBSTETRICS & GYNECOLOGY

## 2020-12-22 ASSESSMENT — FIBROSIS 4 INDEX: FIB4 SCORE: .42

## 2020-12-22 NOTE — PROGRESS NOTES
S/  No complaints  O/Vss Afebrile      Incision-healing well  IMP/ Normal Post-Op  PLAN/Follow up at 6 week Post partum

## 2020-12-22 NOTE — PROGRESS NOTES
Pt. here for C/S check delivered on 12/08/2020  Currently : breast and bottle feeding  Pt. States no concerns today  Post partum visit will be scheduled before leaving today  Chaperone offered not indicated

## 2021-05-27 NOTE — PROGRESS NOTES
S) Pt is a 22 y.o.   at 37w0d  gestation. Routine prenatal care today. Pt without complaints today.  Discussed IOL at 39 weeks.  Pt continues to feel decreased FM.  Discussed importance of 10/2hrs and need to f/u if not meeting.  Fetal movement decreased  Cramping no  VB no  LOF no   Denies dysuria. Generally feels well today. Good self-care activities identified. Denies headaches, swelling, visual changes, or epigastric pain .     O) Blood pressure 110/72, weight 121 kg (266 lb 11.2 oz), last menstrual period 2017, not currently breastfeeding.        Labs: WNL       PNL: WNL       GCT: 3hr WNL       AFP: Not Examined       GBS: negative       Pertinent ultrasound - 18 survey WNL, TYREL 18.2, consistent with previous dating; 18 TYREL 24.85           A) IUP at 37w0d       S=D         Patient Active Problem List    Diagnosis Date Noted   • Polyhydramnios 2018   • Encounter for supervision of other normal pregnancy, third trimester 2018   • History of macrosomia in infant - 8lb 14oz, early 1hr GTT elevated, 3hr GTT wnl 2018   • Depression 2012   • Migraine           SVE: /ballotable         TDAP: yes       FLU: no        BTL: yes       : no       C/S Consent: no       IOL or C/S scheduled: yes - referral placed today for 39wks       LAST PAP: 18 negative         P) Labour precautions discussed.   Fetal movements reviewed.   General ed and anticipatory guidance. Nutrition/exercise/vitamin. Plans breast Plans pp contraception- BTL    IOL for 39 weeks.  RTC 2x/wk NST and 1 week for visit or PRN   Alert and oriented, no focal deficits, no motor or sensory deficits. Negative

## 2021-05-31 ENCOUNTER — HOSPITAL ENCOUNTER (EMERGENCY)
Facility: MEDICAL CENTER | Age: 26
End: 2021-05-31
Attending: EMERGENCY MEDICINE
Payer: MEDICAID

## 2021-05-31 ENCOUNTER — APPOINTMENT (OUTPATIENT)
Dept: RADIOLOGY | Facility: MEDICAL CENTER | Age: 26
End: 2021-05-31
Attending: EMERGENCY MEDICINE

## 2021-05-31 VITALS
DIASTOLIC BLOOD PRESSURE: 71 MMHG | HEART RATE: 81 BPM | BODY MASS INDEX: 40.21 KG/M2 | RESPIRATION RATE: 14 BRPM | OXYGEN SATURATION: 98 % | HEIGHT: 66 IN | SYSTOLIC BLOOD PRESSURE: 106 MMHG | TEMPERATURE: 98.2 F | WEIGHT: 250.22 LBS

## 2021-05-31 DIAGNOSIS — R10.9 FLANK PAIN: ICD-10-CM

## 2021-05-31 DIAGNOSIS — N12 PYELONEPHRITIS: ICD-10-CM

## 2021-05-31 LAB
ALBUMIN SERPL BCP-MCNC: 3.5 G/DL (ref 3.2–4.9)
ALBUMIN/GLOB SERPL: 0.9 G/DL
ALP SERPL-CCNC: 108 U/L (ref 30–99)
ALT SERPL-CCNC: 20 U/L (ref 2–50)
ANION GAP SERPL CALC-SCNC: 11 MMOL/L (ref 7–16)
APPEARANCE UR: ABNORMAL
AST SERPL-CCNC: 18 U/L (ref 12–45)
BACTERIA #/AREA URNS HPF: ABNORMAL /HPF
BASOPHILS # BLD AUTO: 0.5 % (ref 0–1.8)
BASOPHILS # BLD: 0.1 K/UL (ref 0–0.12)
BILIRUB SERPL-MCNC: 0.5 MG/DL (ref 0.1–1.5)
BILIRUB UR QL STRIP.AUTO: NEGATIVE
BUN SERPL-MCNC: 7 MG/DL (ref 8–22)
CALCIUM SERPL-MCNC: 8.7 MG/DL (ref 8.5–10.5)
CHLORIDE SERPL-SCNC: 100 MMOL/L (ref 96–112)
CO2 SERPL-SCNC: 23 MMOL/L (ref 20–33)
COLOR UR: YELLOW
CREAT SERPL-MCNC: 0.66 MG/DL (ref 0.5–1.4)
EOSINOPHIL # BLD AUTO: 0.04 K/UL (ref 0–0.51)
EOSINOPHIL NFR BLD: 0.2 % (ref 0–6.9)
EPI CELLS #/AREA URNS HPF: ABNORMAL /HPF
ERYTHROCYTE [DISTWIDTH] IN BLOOD BY AUTOMATED COUNT: 49.3 FL (ref 35.9–50)
GLOBULIN SER CALC-MCNC: 3.7 G/DL (ref 1.9–3.5)
GLUCOSE SERPL-MCNC: 127 MG/DL (ref 65–99)
GLUCOSE UR STRIP.AUTO-MCNC: NEGATIVE MG/DL
HCG UR QL: NEGATIVE
HCT VFR BLD AUTO: 38.5 % (ref 37–47)
HGB BLD-MCNC: 12.1 G/DL (ref 12–16)
HYALINE CASTS #/AREA URNS LPF: ABNORMAL /LPF
IMM GRANULOCYTES # BLD AUTO: 0.11 K/UL (ref 0–0.11)
IMM GRANULOCYTES NFR BLD AUTO: 0.6 % (ref 0–0.9)
KETONES UR STRIP.AUTO-MCNC: NEGATIVE MG/DL
LEUKOCYTE ESTERASE UR QL STRIP.AUTO: ABNORMAL
LIPASE SERPL-CCNC: 20 U/L (ref 11–82)
LYMPHOCYTES # BLD AUTO: 1.2 K/UL (ref 1–4.8)
LYMPHOCYTES NFR BLD: 6.3 % (ref 22–41)
MCH RBC QN AUTO: 23.5 PG (ref 27–33)
MCHC RBC AUTO-ENTMCNC: 31.4 G/DL (ref 33.6–35)
MCV RBC AUTO: 74.8 FL (ref 81.4–97.8)
MICRO URNS: ABNORMAL
MONOCYTES # BLD AUTO: 0.96 K/UL (ref 0–0.85)
MONOCYTES NFR BLD AUTO: 5.1 % (ref 0–13.4)
NEUTROPHILS # BLD AUTO: 16.56 K/UL (ref 2–7.15)
NEUTROPHILS NFR BLD: 87.3 % (ref 44–72)
NITRITE UR QL STRIP.AUTO: POSITIVE
NRBC # BLD AUTO: 0 K/UL
NRBC BLD-RTO: 0 /100 WBC
PH UR STRIP.AUTO: 6 [PH] (ref 5–8)
PLATELET # BLD AUTO: 281 K/UL (ref 164–446)
PMV BLD AUTO: 9.9 FL (ref 9–12.9)
POTASSIUM SERPL-SCNC: 3.3 MMOL/L (ref 3.6–5.5)
PROT SERPL-MCNC: 7.2 G/DL (ref 6–8.2)
PROT UR QL STRIP: 100 MG/DL
RBC # BLD AUTO: 5.15 M/UL (ref 4.2–5.4)
RBC # URNS HPF: ABNORMAL /HPF
RBC UR QL AUTO: ABNORMAL
SODIUM SERPL-SCNC: 134 MMOL/L (ref 135–145)
SP GR UR STRIP.AUTO: 1.01
UROBILINOGEN UR STRIP.AUTO-MCNC: 0.2 MG/DL
WBC # BLD AUTO: 19 K/UL (ref 4.8–10.8)
WBC #/AREA URNS HPF: ABNORMAL /HPF

## 2021-05-31 PROCEDURE — 83690 ASSAY OF LIPASE: CPT

## 2021-05-31 PROCEDURE — 36415 COLL VENOUS BLD VENIPUNCTURE: CPT

## 2021-05-31 PROCEDURE — 700111 HCHG RX REV CODE 636 W/ 250 OVERRIDE (IP): Performed by: EMERGENCY MEDICINE

## 2021-05-31 PROCEDURE — 85025 COMPLETE CBC W/AUTO DIFF WBC: CPT

## 2021-05-31 PROCEDURE — 74176 CT ABD & PELVIS W/O CONTRAST: CPT

## 2021-05-31 PROCEDURE — 96365 THER/PROPH/DIAG IV INF INIT: CPT

## 2021-05-31 PROCEDURE — 81001 URINALYSIS AUTO W/SCOPE: CPT

## 2021-05-31 PROCEDURE — 81025 URINE PREGNANCY TEST: CPT

## 2021-05-31 PROCEDURE — 80053 COMPREHEN METABOLIC PANEL: CPT

## 2021-05-31 PROCEDURE — 99284 EMERGENCY DEPT VISIT MOD MDM: CPT

## 2021-05-31 PROCEDURE — 96375 TX/PRO/DX INJ NEW DRUG ADDON: CPT

## 2021-05-31 PROCEDURE — 700105 HCHG RX REV CODE 258: Performed by: EMERGENCY MEDICINE

## 2021-05-31 RX ORDER — SULFAMETHOXAZOLE AND TRIMETHOPRIM 800; 160 MG/1; MG/1
1 TABLET ORAL 2 TIMES DAILY
Qty: 14 TABLET | Refills: 0 | Status: SHIPPED | OUTPATIENT
Start: 2021-05-31 | End: 2021-06-07

## 2021-05-31 RX ORDER — KETOROLAC TROMETHAMINE 30 MG/ML
15 INJECTION, SOLUTION INTRAMUSCULAR; INTRAVENOUS ONCE
Status: COMPLETED | OUTPATIENT
Start: 2021-05-31 | End: 2021-05-31

## 2021-05-31 RX ADMIN — KETOROLAC TROMETHAMINE 15 MG: 30 INJECTION, SOLUTION INTRAMUSCULAR; INTRAVENOUS at 21:41

## 2021-05-31 RX ADMIN — CEFTRIAXONE SODIUM 1 G: 1 INJECTION, POWDER, FOR SOLUTION INTRAMUSCULAR; INTRAVENOUS at 21:40

## 2021-05-31 ASSESSMENT — FIBROSIS 4 INDEX: FIB4 SCORE: .42

## 2021-06-01 NOTE — DISCHARGE INSTRUCTIONS
Take all your antibiotics until gone. Return if you have increasing pain, severe vomiting, unable to keep your antibiotics down, or persistently fast heart rate greater than 120 beats a minute.

## 2021-06-01 NOTE — ED PROVIDER NOTES
ED Provider Note    Scribed for Dwayne Mcwilliams M.D. by Arthur Mendez. 5/31/2021, 8:41 PM.    Primary care provider: Pcp Pt States None  Means of arrival: Wheel chair  History obtained from: Patient  History limited by: None    CHIEF COMPLAINT  Chief Complaint   Patient presents with    Back Pain     started last night, into right flank, denies urinary issues    Flank Pain     right    Headache       HPI  Sharyn Dennison is a 25 y.o. female who presents to the Emergency Department with severe back pain onset last night. Her pain worsened today and began radiating to her right flank and groin. Patient also reports a severe headache. She denies any recent trauma to her back. Patient denies any nausea, vomiting, dysuria, or hematuria. Patient denies a history of kidney stones. She has a surgical history of C section and salpingectomy.    PPE Note: I personally donned full PPE for all patient encounters during this visit, including being clean-shaven with an N95 respirator mask, gloves, and goggles.     Scribe remained outside the patient's room and did not have any contact with the patient for the duration of patient encounter.      REVIEW OF SYSTEMS  Pertinent positives include back pain, right sided flank pain, and groin pain. Pertinent negatives include no nausea, vomiting, dysuria, or hematuria. All other systems negative.    PAST MEDICAL HISTORY   has a past medical history of Depression (2012) and Ovarian cyst (6/25/13).    SURGICAL HISTORY   has a past surgical history that includes primary c section with salpingectomy (N/A, 12/8/2020).    SOCIAL HISTORY  Social History     Tobacco Use    Smoking status: Former Smoker     Quit date: 10/19/2017     Years since quitting: 3.6    Smokeless tobacco: Never Used   Vaping Use    Vaping Use: Never used   Substance Use Topics    Alcohol use: No    Drug use: Not Currently     Comment: marijuana      Social History     Substance and Sexual Activity  "  Drug Use Not Currently    Comment: marijuana       FAMILY HISTORY  Family History   Problem Relation Age of Onset    Diabetes Mother         insulin    Hyperlipidemia Father     Hypertension Father     Hypertension Maternal Aunt     Diabetes Maternal Aunt         pills    Diabetes Maternal Grandmother         pills    Hypertension Paternal Grandmother     Hyperlipidemia Paternal Grandmother        CURRENT MEDICATIONS  Home Medications       Reviewed by Lisa Moss R.N. (Registered Nurse) on 05/31/21 at 1804  Med List Status: Partial     Medication Last Dose Status   Prenatal Vit-Fe Fumarate-FA (PRENATAL 1+1 PO)  Active                    ALLERGIES  Allergies   Allergen Reactions    Nkda [No Known Drug Allergy]        PHYSICAL EXAM  VITAL SIGNS: /81   Pulse (!) 110   Temp 36.8 °C (98.2 °F) (Oral)   Resp 20   Ht 1.676 m (5' 6\")   Wt 113 kg (250 lb 3.6 oz)   LMP 05/06/2021   SpO2 98%   BMI 40.39 kg/m²     Constitutional: Well developed, Well nourished, Mild to moderate distress.   HENT: Normocephalic, Atraumatic, mask in place.  Eyes: Conjunctiva normal, No discharge.   Cardiovascular: Normal heart rate, Normal rhythm, No murmurs, equal pulses.   Pulmonary: Normal breath sounds, No respiratory distress, No wheezing, No rales, No rhonchi.  Abdomen:Soft, No tenderness, No masses, no rebound, no guarding. No pain over McBurney's point, negative Diamond's sign.   Back: Right sided CVA tenderness.   Musculoskeletal: No major deformities noted, No tenderness.   Skin: Warm, Dry, No erythema, No rash.   Neurologic: Alert & oriented x 3, Normal motor function,  No focal deficits noted.   Psychiatric: Affect normal, Judgment normal, Mood normal.     LABS  Results for orders placed or performed during the hospital encounter of 05/31/21   URINALYSIS (UA)    Specimen: Urine   Result Value Ref Range    Color Yellow     Character Turbid (A)     Specific Gravity 1.011 <1.035    Ph 6.0 5.0 - 8.0    Glucose Negative " Negative mg/dL    Ketones Negative Negative mg/dL    Protein 100 (A) Negative mg/dL    Bilirubin Negative Negative    Urobilinogen, Urine 0.2 Negative    Nitrite Positive (A) Negative    Leukocyte Esterase Large (A) Negative    Occult Blood Small (A) Negative    Micro Urine Req Microscopic    CBC WITH DIFFERENTIAL   Result Value Ref Range    WBC 19.0 (H) 4.8 - 10.8 K/uL    RBC 5.15 4.20 - 5.40 M/uL    Hemoglobin 12.1 12.0 - 16.0 g/dL    Hematocrit 38.5 37.0 - 47.0 %    MCV 74.8 (L) 81.4 - 97.8 fL    MCH 23.5 (L) 27.0 - 33.0 pg    MCHC 31.4 (L) 33.6 - 35.0 g/dL    RDW 49.3 35.9 - 50.0 fL    Platelet Count 281 164 - 446 K/uL    MPV 9.9 9.0 - 12.9 fL    Neutrophils-Polys 87.30 (H) 44.00 - 72.00 %    Lymphocytes 6.30 (L) 22.00 - 41.00 %    Monocytes 5.10 0.00 - 13.40 %    Eosinophils 0.20 0.00 - 6.90 %    Basophils 0.50 0.00 - 1.80 %    Immature Granulocytes 0.60 0.00 - 0.90 %    Nucleated RBC 0.00 /100 WBC    Neutrophils (Absolute) 16.56 (H) 2.00 - 7.15 K/uL    Lymphs (Absolute) 1.20 1.00 - 4.80 K/uL    Monos (Absolute) 0.96 (H) 0.00 - 0.85 K/uL    Eos (Absolute) 0.04 0.00 - 0.51 K/uL    Baso (Absolute) 0.10 0.00 - 0.12 K/uL    Immature Granulocytes (abs) 0.11 0.00 - 0.11 K/uL    NRBC (Absolute) 0.00 K/uL   COMP METABOLIC PANEL   Result Value Ref Range    Sodium 134 (L) 135 - 145 mmol/L    Potassium 3.3 (L) 3.6 - 5.5 mmol/L    Chloride 100 96 - 112 mmol/L    Co2 23 20 - 33 mmol/L    Anion Gap 11.0 7.0 - 16.0    Glucose 127 (H) 65 - 99 mg/dL    Bun 7 (L) 8 - 22 mg/dL    Creatinine 0.66 0.50 - 1.40 mg/dL    Calcium 8.7 8.5 - 10.5 mg/dL    AST(SGOT) 18 12 - 45 U/L    ALT(SGPT) 20 2 - 50 U/L    Alkaline Phosphatase 108 (H) 30 - 99 U/L    Total Bilirubin 0.5 0.1 - 1.5 mg/dL    Albumin 3.5 3.2 - 4.9 g/dL    Total Protein 7.2 6.0 - 8.2 g/dL    Globulin 3.7 (H) 1.9 - 3.5 g/dL    A-G Ratio 0.9 g/dL   LIPASE   Result Value Ref Range    Lipase 20 11 - 82 U/L   HCG QUALITATIVE UR   Result Value Ref Range    Beta-Hcg Urine Negative  Negative   URINE MICROSCOPIC (W/UA)   Result Value Ref Range    WBC 20-50 (A) /hpf    RBC Rare /hpf    Bacteria Many (A) None /hpf    Epithelial Cells Few /hpf    Hyaline Cast 6-10 (A) /lpf   ESTIMATED GFR   Result Value Ref Range    GFR If African American >60 >60 mL/min/1.73 m 2    GFR If Non African American >60 >60 mL/min/1.73 m 2      All labs reviewed by me.    RADIOLOGY  CT-RENAL COLIC EVALUATION(A/P W/O)   Final Result         1.  RIGHT-SIDED PERIRENAL STRANDING IS NOTED. FINDINGS COULD INDICATE RIGHT PYELONEPHRITIS. RECENT RIGHT-SIDED OBSTRUCTION BY CALCULUS THAT HAS PASSED IS ALSO POSSIBLE.      2.  NO LEFT-SIDED HYDRONEPHROSIS.      3.  CALCIFICATIONS INCIDENTALLY NOTED IN THE GALLBLADDER. GALLBLADDER IS NOT DISTENDED AND NO SURROUNDING INFLAMMATION IS APPRECIATED BY CT.           The radiologist's interpretation of all radiological studies have been reviewed by me.    COURSE & MEDICAL DECISION MAKING  Pertinent Labs & Imaging studies reviewed. (See chart for details)    8:41 PM - Patient seen and examined at bedside. I informed her she would be evaluated with labs and imaging to rule out UTI or kidney stones and given Toradol to manage her pain. Patient will be treated with Toradol 15 mg injection. Ordered CT-Renal colic, HCG Qualitative UR, CBC w/ diff, CMP, Lipase, and UA to evaluate her symptoms. The differential diagnoses include but are not limited to: Nephrolithiases, UTI, Pyelonephritis    9:21 PM - Patient's labs and imaging reviewed which are notable for signs of a urinary tract infection. Ordered Rocephin 100 mL IVPB.    9:23 PM - Patient was reevaluated at bedside and updated on her labs and imaging. Patient was informed she has a UTI. I discussed sending the patient home with a course of antibiotics and pain medication to manage her symptoms. Patient was informed she would stay in the ED until her CT scans return. Patient advised to follow up with [facility]. Return precautions  discussed. Patient verbalizes understanding and agreement to this plan.    9:45 PM - Patient's imaging studies reviewed which are notable for possibly pyelonephritis, passed renal calculus, and incidental gall bladder calculi.    9:51 PM - Patient was reevaluated at bedside and updated on her imaging results. I informed the patient she has pyelonephritis which is treatable with antibiotics. I encouraged the patient to get her prescription antibiotics as soon as possible. Patient is comfortable with discharge at this time.    Medical Decision Making: At this point time I think the patient's flank pain is secondary to pyelonephritis.  Patient does not show any signs of obstructing stone.  Initially CT was done because the patient described pain rating into her groin and I thought she may have a kidney stone.  She has been given a dose of Rocephin here.  Her vital signs are otherwise improving I do not think she needs to be hospitalized.  Will discharge the patient home on Bactrim for her pyelonephritis.     The patient will return for new or worsening symptoms and is stable at the time of discharge.    Patient has had high blood pressure while in the emergency department, felt likely secondary to medical condition. Counseled patient to monitor blood pressure at home and follow up with primary care physician.     DISPOSITION:  Patient will be discharged home in stable condition.    FOLLOW UP:  Your doctor    Schedule an appointment as soon as possible for a visit in 1 week      [address] [address] [phone]    If you need a doctor    [address] [address] [phone]    If you need a doctor      OUTPATIENT MEDICATIONS:  Discharge Medication List as of 5/31/2021 10:08 PM        START taking these medications    Details   sulfamethoxazole-trimethoprim (BACTRIM DS) 800-160 MG tablet Take 1 tablet by mouth 2 times a  day for 7 days., Disp-14 tablet, R-0, Print Rx Paper               FINAL IMPRESSION  1. Pyelonephritis    2. Flank pain          Arthur GRACE (Scriblexy), am scribing for, and in the presence of, Dwayne Mcwilliams M.D.    Electronically signed by: Arthur Mendez ([place]), 5/31/2021    IDwayne M.D. personally performed the services described in this documentation, as scribed by Arthur Mendez in my presence, and it is both accurate and complete.    C.    The note accurately reflects work and decisions made by me.  Dwayne Mcwilliams M.D.  6/1/2021  12:26 AM

## 2021-06-01 NOTE — ED TRIAGE NOTES
Pt to triage with   Chief Complaint   Patient presents with   • Back Pain     started last night, into right flank, denies urinary issues   • Flank Pain     right   • Headache      Pt Informed regarding triage process and verbalized understanding to inform triage tech or RN for any changes in condition. Placed in lobby.

## 2021-06-01 NOTE — ED NOTES
Patient changed into gown and placed on monitor. IV started and labs drawn. Updated patient on plan of care, verbalized understanding. Patient wearing mask on arrival

## 2021-08-05 NOTE — OP REPORT
Operative Report  20      PreOp Diagnosis:   1. SIUP @ 37w3d  2. Intrahepatic cholestasis of pregnancy  3. Arrest of active labor  4. Desired permanent sterilization    PostOp Diagnosis:   S/p primary LTCS with bilateral salpingectomy    Procedure(s):   SECTION, PRIMARY, WITH SALPINGECTOMY - Wound Class: Clean Contaminated    Surgeon(s):  Cindy Werner M.D.    Anesthesiologist/Type of Anesthesia:  Anesthesiologist: Cindy Lagunas M.D.; Alcon Ventura M.D./Epidural    Surgical Staff:  Assistant: Yuliana Hill  Circulator: El Phillips R.N.  Scrub Person: Cisco Velez  L&D Circulator Assistant: Loretta Arshad R.N.  L&D Baby  Nurse: Brigitte Vallejo R.N.    Indication: 24yo  admitted at 37w2d for IOL secondary to IHCP.  She progressed to 7cm however then did not progress any further despite >6hrs of increasing pitocin with intermittently adequate MVUs.  Pt was then given the option of primary  for arrest of active phase labor versus continued attempt at induction of labor.  Patient preferred to proceed with primary  delivery with desired bilateral salpingectomy.    Estimated Blood Loss: 800cc  IVF: 1500cc LR  UOP: 200cc      Bilateral tubes sent to pathology.    Findings: vigorous female infant, ROT position, AGPARs , wt 3300g.  Normal uterus, tubes, and ovaries    Complications: none         Principal Procedure As Follows:  The pt ws taken to the operating room where epidural anesthesia was found to be adequate.  The patient was prepped and draped in a normal supine position with a wedge under the right hip.  A low transverse skin incision was made 2 fingerbreadths above the pubic symphysis and taken down to the fascia with the scalpel, which was incised bilaterally with the scalpel.  The fascial incision was extended laterally with the Hendricks scissors.  The rectus muscles were dissected off the rectus sheath with Hendricks scissors  superiorly.  There was separation of the rectus sheath superiorly and the peritoneum was entered bluntly, extended with stretching.  The bladder blade was placed.  The lower uterine segment was incised transversely and the endometrial cavity entered with a blunt finger, clear fluid was noted.  The hysterotomy was extended with cephalad-caudad stretching.  The infant's head was manually elevated to the level of the hysterotomy and delivered, followed atraumatically by the anterior shoulder, posterior shoulder and body with help of fundal pressure.  The infant was stimulated and cord clamping was delayed x45 seconds then the cord was clamped x2, cut and the infant handed to nurse.  Cord gases were not sent.  The placenta delivered with gentle cord traction and uterine massage and was noted to be intact with 3-vessel cord. The uterus was exteriorized, cleared of all clots and debris and closed in 1 layers with 0-chromic  Attention was then turned to the bilateral salpingectomy.  The left tube was elevated with [place] clamps and the LigaSure device used to coagulate and cut progressively up the mesosalpinx.  The tube was amputated with LigaSure device near the cornua, labeled, and sent to pathology.  The right tube was similarly elevated with Neil clamps and amputated with LigaSure device, labeled, and sent to pathology.  Bilateral hemostasis was noted.  The uterus was returned to the abdomen, the gutters cleared of all clots and debris and the hysterotomy re-examined with small bleeding left of midline for which a single figure of 8 was placed with resulting hemostasis noted.  The bilateral salpingectomy sites and the hysterotomy were re-examined and noted to be hemostatic.  The rectus muscles were inspected, found to be hemostatic.  The fascia was closed with 0 Vicryl.  The subcutaneous tissues was irrigated. Bleeders were coagulated with the bovie.  The subcutaneous tissues were approximated with running 2-0  plain in 3 layers, and the skin with subcuticular suture with 4-0 monocryl.  Dermabond was placed.  All counts were correct.  Pt and infant went to recovery in good condition.       Shannon Werner M.D.          Protopic Counseling: Patient may experience a mild burning sensation during topical application. Protopic is not approved in children less than 2 years of age. There have been case reports of hematologic and skin malignancies in patients using topical calcineurin inhibitors although causality is questionable.

## 2021-10-05 ENCOUNTER — APPOINTMENT (OUTPATIENT)
Dept: RADIOLOGY | Facility: MEDICAL CENTER | Age: 26
End: 2021-10-05
Attending: EMERGENCY MEDICINE

## 2021-10-05 ENCOUNTER — HOSPITAL ENCOUNTER (EMERGENCY)
Facility: MEDICAL CENTER | Age: 26
End: 2021-10-05
Attending: EMERGENCY MEDICINE

## 2021-10-05 VITALS
SYSTOLIC BLOOD PRESSURE: 108 MMHG | OXYGEN SATURATION: 99 % | DIASTOLIC BLOOD PRESSURE: 63 MMHG | WEIGHT: 245.15 LBS | TEMPERATURE: 97.7 F | RESPIRATION RATE: 16 BRPM | HEIGHT: 66 IN | HEART RATE: 94 BPM | BODY MASS INDEX: 39.4 KG/M2

## 2021-10-05 DIAGNOSIS — N83.202 CYST OF LEFT OVARY: ICD-10-CM

## 2021-10-05 DIAGNOSIS — N12 PYELONEPHRITIS: ICD-10-CM

## 2021-10-05 LAB
ALBUMIN SERPL BCP-MCNC: 3.1 G/DL (ref 3.2–4.9)
ALBUMIN/GLOB SERPL: 0.8 G/DL
ALP SERPL-CCNC: 106 U/L (ref 30–99)
ALT SERPL-CCNC: 11 U/L (ref 2–50)
ANION GAP SERPL CALC-SCNC: 10 MMOL/L (ref 7–16)
APPEARANCE UR: ABNORMAL
AST SERPL-CCNC: 11 U/L (ref 12–45)
BACTERIA #/AREA URNS HPF: ABNORMAL /HPF
BASOPHILS # BLD AUTO: 0.3 % (ref 0–1.8)
BASOPHILS # BLD: 0.05 K/UL (ref 0–0.12)
BILIRUB SERPL-MCNC: 0.3 MG/DL (ref 0.1–1.5)
BILIRUB UR QL STRIP.AUTO: NEGATIVE
BUN SERPL-MCNC: 7 MG/DL (ref 8–22)
CALCIUM SERPL-MCNC: 8.6 MG/DL (ref 8.4–10.2)
CHLORIDE SERPL-SCNC: 102 MMOL/L (ref 96–112)
CO2 SERPL-SCNC: 23 MMOL/L (ref 20–33)
COLOR UR: YELLOW
CREAT SERPL-MCNC: 0.55 MG/DL (ref 0.5–1.4)
EOSINOPHIL # BLD AUTO: 0.13 K/UL (ref 0–0.51)
EOSINOPHIL NFR BLD: 0.9 % (ref 0–6.9)
EPI CELLS #/AREA URNS HPF: ABNORMAL /HPF
ERYTHROCYTE [DISTWIDTH] IN BLOOD BY AUTOMATED COUNT: 43.4 FL (ref 35.9–50)
GLOBULIN SER CALC-MCNC: 3.7 G/DL (ref 1.9–3.5)
GLUCOSE SERPL-MCNC: 115 MG/DL (ref 65–99)
GLUCOSE UR STRIP.AUTO-MCNC: NEGATIVE MG/DL
HCG SERPL QL: NEGATIVE
HCT VFR BLD AUTO: 36.7 % (ref 37–47)
HGB BLD-MCNC: 11.2 G/DL (ref 12–16)
HYALINE CASTS #/AREA URNS LPF: ABNORMAL /LPF
IMM GRANULOCYTES # BLD AUTO: 0.08 K/UL (ref 0–0.11)
IMM GRANULOCYTES NFR BLD AUTO: 0.6 % (ref 0–0.9)
KETONES UR STRIP.AUTO-MCNC: NEGATIVE MG/DL
LEUKOCYTE ESTERASE UR QL STRIP.AUTO: ABNORMAL
LIPASE SERPL-CCNC: 16 U/L (ref 7–58)
LYMPHOCYTES # BLD AUTO: 2.67 K/UL (ref 1–4.8)
LYMPHOCYTES NFR BLD: 18.4 % (ref 22–41)
MCH RBC QN AUTO: 23.8 PG (ref 27–33)
MCHC RBC AUTO-ENTMCNC: 30.5 G/DL (ref 33.6–35)
MCV RBC AUTO: 78.1 FL (ref 81.4–97.8)
MICRO URNS: ABNORMAL
MONOCYTES # BLD AUTO: 1.04 K/UL (ref 0–0.85)
MONOCYTES NFR BLD AUTO: 7.2 % (ref 0–13.4)
NEUTROPHILS # BLD AUTO: 10.53 K/UL (ref 2–7.15)
NEUTROPHILS NFR BLD: 72.6 % (ref 44–72)
NITRITE UR QL STRIP.AUTO: NEGATIVE
NRBC # BLD AUTO: 0 K/UL
NRBC BLD-RTO: 0 /100 WBC
PH UR STRIP.AUTO: 6.5 [PH] (ref 5–8)
PLATELET # BLD AUTO: 332 K/UL (ref 164–446)
PMV BLD AUTO: 9.7 FL (ref 9–12.9)
POTASSIUM SERPL-SCNC: 3.7 MMOL/L (ref 3.6–5.5)
PROT SERPL-MCNC: 6.8 G/DL (ref 6–8.2)
PROT UR QL STRIP: NEGATIVE MG/DL
RBC # BLD AUTO: 4.7 M/UL (ref 4.2–5.4)
RBC # URNS HPF: ABNORMAL /HPF
RBC UR QL AUTO: NEGATIVE
SODIUM SERPL-SCNC: 135 MMOL/L (ref 135–145)
SP GR UR STRIP.AUTO: 1.01
WBC # BLD AUTO: 14.5 K/UL (ref 4.8–10.8)
WBC #/AREA URNS HPF: ABNORMAL /HPF

## 2021-10-05 PROCEDURE — 84703 CHORIONIC GONADOTROPIN ASSAY: CPT

## 2021-10-05 PROCEDURE — 80053 COMPREHEN METABOLIC PANEL: CPT

## 2021-10-05 PROCEDURE — 96375 TX/PRO/DX INJ NEW DRUG ADDON: CPT

## 2021-10-05 PROCEDURE — 74176 CT ABD & PELVIS W/O CONTRAST: CPT

## 2021-10-05 PROCEDURE — 81001 URINALYSIS AUTO W/SCOPE: CPT

## 2021-10-05 PROCEDURE — 83690 ASSAY OF LIPASE: CPT

## 2021-10-05 PROCEDURE — 700105 HCHG RX REV CODE 258: Performed by: EMERGENCY MEDICINE

## 2021-10-05 PROCEDURE — 87077 CULTURE AEROBIC IDENTIFY: CPT

## 2021-10-05 PROCEDURE — 700111 HCHG RX REV CODE 636 W/ 250 OVERRIDE (IP): Performed by: EMERGENCY MEDICINE

## 2021-10-05 PROCEDURE — 96374 THER/PROPH/DIAG INJ IV PUSH: CPT

## 2021-10-05 PROCEDURE — 87186 SC STD MICRODIL/AGAR DIL: CPT

## 2021-10-05 PROCEDURE — 76705 ECHO EXAM OF ABDOMEN: CPT

## 2021-10-05 PROCEDURE — 36415 COLL VENOUS BLD VENIPUNCTURE: CPT

## 2021-10-05 PROCEDURE — 87086 URINE CULTURE/COLONY COUNT: CPT

## 2021-10-05 PROCEDURE — 99285 EMERGENCY DEPT VISIT HI MDM: CPT

## 2021-10-05 PROCEDURE — 96376 TX/PRO/DX INJ SAME DRUG ADON: CPT

## 2021-10-05 PROCEDURE — 85025 COMPLETE CBC W/AUTO DIFF WBC: CPT

## 2021-10-05 RX ORDER — AMPICILLIN 500 MG/1
1000 CAPSULE ORAL ONCE
COMMUNITY

## 2021-10-05 RX ORDER — KETOROLAC TROMETHAMINE 30 MG/ML
15 INJECTION, SOLUTION INTRAMUSCULAR; INTRAVENOUS ONCE
Status: COMPLETED | OUTPATIENT
Start: 2021-10-05 | End: 2021-10-05

## 2021-10-05 RX ORDER — ONDANSETRON 4 MG/1
4 TABLET, ORALLY DISINTEGRATING ORAL EVERY 8 HOURS PRN
Qty: 6 TABLET | Refills: 0 | Status: SHIPPED | OUTPATIENT
Start: 2021-10-05 | End: 2021-10-07

## 2021-10-05 RX ORDER — SODIUM CHLORIDE 9 MG/ML
1000 INJECTION, SOLUTION INTRAVENOUS ONCE
Status: COMPLETED | OUTPATIENT
Start: 2021-10-05 | End: 2021-10-05

## 2021-10-05 RX ORDER — SULFAMETHOXAZOLE AND TRIMETHOPRIM 800; 160 MG/1; MG/1
1 TABLET ORAL 2 TIMES DAILY
Qty: 14 TABLET | Refills: 0 | Status: SHIPPED | OUTPATIENT
Start: 2021-10-05 | End: 2021-10-09

## 2021-10-05 RX ORDER — ONDANSETRON 2 MG/ML
4 INJECTION INTRAMUSCULAR; INTRAVENOUS ONCE
Status: COMPLETED | OUTPATIENT
Start: 2021-10-05 | End: 2021-10-05

## 2021-10-05 RX ORDER — IBUPROFEN 800 MG/1
800 TABLET ORAL EVERY 8 HOURS PRN
COMMUNITY

## 2021-10-05 RX ORDER — MORPHINE SULFATE 4 MG/ML
4 INJECTION, SOLUTION INTRAMUSCULAR; INTRAVENOUS ONCE
Status: COMPLETED | OUTPATIENT
Start: 2021-10-05 | End: 2021-10-05

## 2021-10-05 RX ORDER — CEFTRIAXONE 2 G/1
2 INJECTION, POWDER, FOR SOLUTION INTRAMUSCULAR; INTRAVENOUS ONCE
Status: COMPLETED | OUTPATIENT
Start: 2021-10-05 | End: 2021-10-05

## 2021-10-05 RX ADMIN — CEFTRIAXONE SODIUM 2 G: 2 INJECTION, POWDER, FOR SOLUTION INTRAMUSCULAR; INTRAVENOUS at 19:12

## 2021-10-05 RX ADMIN — ONDANSETRON HYDROCHLORIDE 4 MG: 2 SOLUTION INTRAMUSCULAR; INTRAVENOUS at 17:08

## 2021-10-05 RX ADMIN — KETOROLAC TROMETHAMINE 15 MG: 30 INJECTION, SOLUTION INTRAMUSCULAR at 17:09

## 2021-10-05 RX ADMIN — SODIUM CHLORIDE 1000 ML: 9 INJECTION, SOLUTION INTRAVENOUS at 17:09

## 2021-10-05 RX ADMIN — MORPHINE SULFATE 4 MG: 4 INJECTION INTRAVENOUS at 21:17

## 2021-10-05 RX ADMIN — ONDANSETRON HYDROCHLORIDE 4 MG: 2 SOLUTION INTRAMUSCULAR; INTRAVENOUS at 21:17

## 2021-10-05 ASSESSMENT — PAIN DESCRIPTION - PAIN TYPE: TYPE: ACUTE PAIN

## 2021-10-05 ASSESSMENT — FIBROSIS 4 INDEX: FIB4 SCORE: 0.39

## 2021-10-05 NOTE — ED NOTES
Med rec updated and complete  Allergies reviewed  Pt reports no vitamins   Pt reports she received AMPICILLIN 500MG at a store, pt took 2 capsule today @ 0830    No current facility-administered medications on file prior to encounter.     Current Outpatient Medications on File Prior to Encounter   Medication Sig Dispense Refill   • ibuprofen (MOTRIN) 800 MG Tab Take 800 mg by mouth every 8 hours as needed.     • ampicillin (PRINCIPEN) 500 MG Cap Take 1,000 mg by mouth one time. Pt receive at a store, pt started on 10/5/2021

## 2021-10-05 NOTE — ED PROVIDER NOTES
"ED Provider Note    CHIEF COMPLAINT  Chief Complaint   Patient presents with   • Flank Pain     R flank pain x one week  Denies any pain with urination       HPI  Sharyn Dennison is a 25 y.o. female who presents to the ER with complaint of right flank pain for the last 1 week.  She says the pain is sharp in nature.  Initially it would come and go.  Over the last 24 hours it has been constant.  It starts in the right flank and then radiates around to the right upper quadrant.  She denies history of similar pain in the past.  Pain gets worse when she takes a deep breath.  No cough.  She is not vaccinated for COVID-19.  No fevers or chills.  No nausea or vomiting.  Eating does not make the pain worse.  ED does not precipitate the pain.  She describes increased urinary frequency and urgency over the last couple days.  No hematuria.  No cloudy or foul-smelling urine.  Patient states she had right sided pain back in May when she was here in the ER although this is \"worse.\"  However, she admits this is similar pain in terms of location, character and quality.    REVIEW OF SYSTEMS  See HPI for further details. All other systems are negative.    PAST MEDICAL HISTORY  Past Medical History:   Diagnosis Date   • Depression 2012    postpartum depression   • Ovarian cyst 6/25/13       FAMILY HISTORY  Family History   Problem Relation Age of Onset   • Diabetes Mother         insulin   • Hyperlipidemia Father    • Hypertension Father    • Hypertension Maternal Aunt    • Diabetes Maternal Aunt         pills   • Diabetes Maternal Grandmother         pills   • Hypertension Paternal Grandmother    • Hyperlipidemia Paternal Grandmother        SOCIAL HISTORY  Social History     Socioeconomic History   • Marital status:      Spouse name: Not on file   • Number of children: Not on file   • Years of education: Not on file   • Highest education level: Not on file   Occupational History   • Not on file   Tobacco Use "   • Smoking status: Current Every Day Smoker     Last attempt to quit: 10/19/2017     Years since quitting: 3.9   • Smokeless tobacco: Never Used   Vaping Use   • Vaping Use: Never used   Substance and Sexual Activity   • Alcohol use: No   • Drug use: Not Currently     Comment: marijuana   • Sexual activity: Yes     Partners: Male     Birth control/protection: Surgical, None     Comment: desires postpartum btl.    Other Topics Concern   • Not on file   Social History Narrative    ** Merged History Encounter **          Social Determinants of Health     Financial Resource Strain:    • Difficulty of Paying Living Expenses:    Food Insecurity: Unknown   • Worried About Running Out of Food in the Last Year: Patient refused   • Ran Out of Food in the Last Year: Patient refused   Transportation Needs: Unknown   • Lack of Transportation (Medical): Patient refused   • Lack of Transportation (Non-Medical): Patient refused   Physical Activity:    • Days of Exercise per Week:    • Minutes of Exercise per Session:    Stress:    • Feeling of Stress :    Social Connections:    • Frequency of Communication with Friends and Family:    • Frequency of Social Gatherings with Friends and Family:    • Attends Mosque Services:    • Active Member of Clubs or Organizations:    • Attends Club or Organization Meetings:    • Marital Status:    Intimate Partner Violence:    • Fear of Current or Ex-Partner:    • Emotionally Abused:    • Physically Abused:    • Sexually Abused:        SURGICAL HISTORY  Past Surgical History:   Procedure Laterality Date   • PRIMARY C SECTION WITH SALPINGECTOMY N/A 2020    Procedure:  SECTION, PRIMARY, WITH SALPINGECTOMY;  Surgeon: Cindy Werner M.D.;  Location: LABOR AND DELIVERY;  Service: Obstetrics       CURRENT MEDICATIONS  Home Medications     Reviewed by Brook Clements (Pharmacy Tech) on 10/05/21 at 1609  Med List Status: Complete   Medication Last Dose Status   ampicillin  "(PRINCIPEN) 500 MG Cap 10/5/2021 Active   ibuprofen (MOTRIN) 800 MG Tab 10/4/2021 Active                ALLERGIES  Allergies   Allergen Reactions   • Nkda [No Known Drug Allergy]        PHYSICAL EXAM  VITAL SIGNS: /63   Pulse 94   Temp 36.5 °C (97.7 °F) (Temporal)   Resp 16   Ht 1.676 m (5' 6\")   Wt 111 kg (245 lb 2.4 oz)   LMP 09/10/2021   SpO2 99%   BMI 39.57 kg/m²      Constitutional: Well developed, well nourished; Mild acute distress; Non-toxic appearance. Elevated BMI  HENT: Normocephalic, atraumatic; Bilateral external ears normal; oropharyngeal examination deferred due to COVID-19 outbreak and lack of oropharyngeal complaint  Eyes: PERRL, EOMI, Conjunctiva normal. No discharge.   Neck:  Supple, nontender midline; No stridor; No nuchal rigidity.   Lymphatic: No cervical lymphadenopathy noted.   Cardiovascular: Regular rate and rhythm without murmurs, rubs, or gallop.   Thorax & Lungs: No respiratory distress, breath sounds clear to auscultation bilaterally without wheezing, rales or rhonchi. Nontender chest wall. No crepitus or subcutaneous air  Abdomen: Soft, tender in the right upper quadrant and right mid abdomen with percussion and palpation bowel sounds normal. No obvious masses; No pulsatile masses; no rebound, guarding, or peritoneal signs.   Skin: Good color; warm and dry without rash or petechia.  Back: Nontender, positive right CVA tenderness.   Extremities: Distal radial, dorsalis pedis, posterior tibial pulses are equal bilaterally; No edema; Nontender calves or saphenous, No cyanosis, No clubbing.   Musculoskeletal: Good range of motion in all major joints. No tenderness to palpation or major deformities noted.   Neurologic: Alert & oriented x 4, clear speech      RADIOLOGY/PROCEDURES  US-RUQ   Final Result         1.  Hepatomegaly and echogenic liver, compatible with fatty change versus fibrosis.   2.  Cholelithiasis without additional sonographic findings of acute cholecystitis. "      Note: Evaluation of the liver is limited due to poor through transmission.      CT-RENAL COLIC EVALUATION(A/P W/O)   Final Result      1.  Minimal right perinephric stranding can be seen in the setting of pyelonephritis. Correlation with urinalysis is recommended.   2.  Hepatosplenomegaly.   3.  Cholelithiasis.   4.  4.6 x 4.2 cm left adnexal cyst for which follow-up pelvic ultrasound is recommended.          COURSE & MEDICAL DECISION MAKING  Pertinent Labs & Imaging studies reviewed. (See chart for details)    Results for orders placed or performed during the hospital encounter of 10/05/21   CBC WITH DIFFERENTIAL   Result Value Ref Range    WBC 14.5 (H) 4.8 - 10.8 K/uL    RBC 4.70 4.20 - 5.40 M/uL    Hemoglobin 11.2 (L) 12.0 - 16.0 g/dL    Hematocrit 36.7 (L) 37.0 - 47.0 %    MCV 78.1 (L) 81.4 - 97.8 fL    MCH 23.8 (L) 27.0 - 33.0 pg    MCHC 30.5 (L) 33.6 - 35.0 g/dL    RDW 43.4 35.9 - 50.0 fL    Platelet Count 332 164 - 446 K/uL    MPV 9.7 9.0 - 12.9 fL    Neutrophils-Polys 72.60 (H) 44.00 - 72.00 %    Lymphocytes 18.40 (L) 22.00 - 41.00 %    Monocytes 7.20 0.00 - 13.40 %    Eosinophils 0.90 0.00 - 6.90 %    Basophils 0.30 0.00 - 1.80 %    Immature Granulocytes 0.60 0.00 - 0.90 %    Nucleated RBC 0.00 /100 WBC    Neutrophils (Absolute) 10.53 (H) 2.00 - 7.15 K/uL    Lymphs (Absolute) 2.67 1.00 - 4.80 K/uL    Monos (Absolute) 1.04 (H) 0.00 - 0.85 K/uL    Eos (Absolute) 0.13 0.00 - 0.51 K/uL    Baso (Absolute) 0.05 0.00 - 0.12 K/uL    Immature Granulocytes (abs) 0.08 0.00 - 0.11 K/uL    NRBC (Absolute) 0.00 K/uL   COMP METABOLIC PANEL   Result Value Ref Range    Sodium 135 135 - 145 mmol/L    Potassium 3.7 3.6 - 5.5 mmol/L    Chloride 102 96 - 112 mmol/L    Co2 23 20 - 33 mmol/L    Anion Gap 10.0 7.0 - 16.0    Glucose 115 (H) 65 - 99 mg/dL    Bun 7 (L) 8 - 22 mg/dL    Creatinine 0.55 0.50 - 1.40 mg/dL    Calcium 8.6 8.4 - 10.2 mg/dL    AST(SGOT) 11 (L) 12 - 45 U/L    ALT(SGPT) 11 2 - 50 U/L    Alkaline  Phosphatase 106 (H) 30 - 99 U/L    Total Bilirubin 0.3 0.1 - 1.5 mg/dL    Albumin 3.1 (L) 3.2 - 4.9 g/dL    Total Protein 6.8 6.0 - 8.2 g/dL    Globulin 3.7 (H) 1.9 - 3.5 g/dL    A-G Ratio 0.8 g/dL   LIPASE   Result Value Ref Range    Lipase 16 7 - 58 U/L   HCG QUAL SERUM   Result Value Ref Range    Beta-Hcg Qualitative Serum Negative Negative   URINALYSIS,CULTURE IF INDICATED    Specimen: Urine   Result Value Ref Range    Color Yellow     Character Hazy (A)     Specific Gravity 1.015 <1.035    Ph 6.5 5.0 - 8.0    Glucose Negative Negative mg/dL    Ketones Negative Negative mg/dL    Protein Negative Negative mg/dL    Bilirubin Negative Negative    Nitrite Negative Negative    Leukocyte Esterase Moderate (A) Negative    Occult Blood Negative Negative    Micro Urine Req Microscopic    ESTIMATED GFR   Result Value Ref Range    GFR If African American >60 >60 mL/min/1.73 m 2    GFR If Non African American >60 >60 mL/min/1.73 m 2   URINE MICROSCOPIC (W/UA)   Result Value Ref Range    WBC  (A) /hpf    RBC 0-2 /hpf    Bacteria Moderate (A) None /hpf    Epithelial Cells Many (A) Few /hpf    Hyaline Cast 0-2 /lpf         Patient complains of right-sided flank pain and right upper quadrant abdominal pain on and off for the last 1 week.  It has been constant over the last 24 hours.  It seems to start in the right flank and radiates into the right upper abdomen.  Of last couple days she has had increased urinary frequency and urgency.  She was here in May of this year and was diagnosed with a pyelonephritis.  She admits to having similar pain at that time although she says this episode of pain is much more severe than not episode.  However, location and character/quality are the same.  She did have right-sided CVA tenderness.  She has not had any fevers or chills.  No nausea or vomiting.  Eating does not seem to make the pain worse.  Urine does reveal evidence of urinary tract infection.  Urine culture was obtained and is  pending.  She was given a dose of Rocephin.  CT scan was performed without contrast to evaluate for infected ureteral stone.  CT scan reveals some stranding around the right kidney consistent with pyelonephritis.  She also, however, had gallstones which were identified on CT scan.  For this reason she underwent a ultrasound of the gallbladder just to be sure we were not dealing with a cholecystitis.  Her LFTs are normal.  Her lipase is normal.  No evidence of cholecystitis on ultrasound.  At this time I think the patient's pain is likely secondary to a pyelonephritis.  Vital signs are normal and stable.   she is improved with pain medication.  At this time I think she is safe and stable for outpatient management discharge home.  White count is 14,000.  She does not appear to be septic or toxic.  She is afebrile here.  She is not hypotensive.  She will go home on Bactrim for outpatient antibiotic protocol.  She will to go home with medication for Zofran.  She has been given very strict return precautions and discharge instructions.  She understands if she gets worse in any way she must return immediately.  I have also asked that she follow-up with primary care physician and/or gynecology regarding her 4 cm left ovarian cyst.  At this time she has no left pelvic pain and I do not think the cyst is causing problems at this time, but she understands that 4 cm cyst need to be closely followed.  She understands and agrees.      I verified that the patient was wearing a mask and I was wearing appropriate PPE every time I entered the room. The patient's mask was on the patient at all times during my encounter except for a brief view of the oropharynx.    FINAL IMPRESSION  1. Pyelonephritis Acute sulfamethoxazole-trimethoprim (BACTRIM DS) 800-160 MG tablet    ondansetron (ZOFRAN ODT) 4 MG TABLET DISPERSIBLE   2. Cyst of left ovary Acute         This dictation has been created using voice recognition software. The accuracy of  the dictation is limited by the abilities of the software. I expect there may be some errors of grammar and possibly content. I made every attempt to manually correct the errors within my dictation. However, errors related to voice recognition software may still exist and should be interpreted within the appropriate context.    Electronically signed by: Britt Loredo M.D., 10/5/2021 3:54 PM

## 2021-10-06 NOTE — DISCHARGE INSTRUCTIONS
Please speak with your gynecologist about getting a ultrasound of your left ovarian cyst.    Drink plenty of fluids to stay well-hydrated.    Return to the ER for any worsening abdominal pain, worsening flank pain, fevers over 100.4, shaking chills, nausea, vomiting, diarrhea, cloudy or foul-smelling urine, shortness of breath, chest pains, coughing of rust colored phlegm or blood, or for any concerns.      Follow-up with your primary care physician within the next 1 to 2 days.  If you do not have a primary care physician, please follow-up with the [facility] clinic within the next 1 to 2 days.  Call for appointment.

## 2021-10-08 LAB
BACTERIA UR CULT: ABNORMAL
BACTERIA UR CULT: ABNORMAL
SIGNIFICANT IND 70042: ABNORMAL
SITE SITE: ABNORMAL
SOURCE SOURCE: ABNORMAL

## 2021-10-09 RX ORDER — AMOXICILLIN AND CLAVULANATE POTASSIUM 875; 125 MG/1; MG/1
1 TABLET, FILM COATED ORAL 2 TIMES DAILY
Qty: 14 TABLET | Refills: 0 | Status: SHIPPED | OUTPATIENT
Start: 2021-10-09 | End: 2021-10-16

## 2021-10-09 NOTE — ED NOTES
ED Positive Culture Follow-up/Notification Note:    Date: 10/9/2021     Patient seen in the ED on 10/5/2021 for right flank pain and increased urinary frequency. Cholelithiasis seen on abd/pelvic CT but no evidence cholecystitis on ultrasound. No evidence of nephrolithiasis or bladder stones on CT. Patient received ceftriaxone 2 g IV x1 in the ED.  1. Pyelonephritis Acute   2. Cyst of left ovary Acute      Discharge Medication List as of 10/5/2021  9:24 PM      START taking these medications    Details   sulfamethoxazole-trimethoprim (BACTRIM DS) 800-160 MG tablet Take 1 Tablet by mouth 2 times a day for 7 days., Disp-14 Tablet, R-0, Normal      ondansetron (ZOFRAN ODT) 4 MG TABLET DISPERSIBLE Take 1 Tablet by mouth every 8 hours as needed for Nausea for up to 2 days., Disp-6 Tablet, R-0, Normal             Allergies: Nkda [no known drug allergy]     Vitals:    10/05/21 1815 10/05/21 2048 10/05/21 2049 10/05/21 2114   BP: 124/62 108/77  108/63   Pulse: 94 79  94   Resp:    16   Temp:    36.5 °C (97.7 °F)   TempSrc:    Temporal   SpO2: 97%  98% 99%   Weight:       Height:           Final cultures:   Results     Procedure Component Value Units Date/Time    URINE CULTURE(NEW) [[id number]]  (Abnormal)  (Susceptibility) Collected: 10/05/21 1824    Order Status: Completed Specimen: Urine, Clean Catch Updated: 10/08/21 1045     Significant Indicator POS     Source UR     Site URINE, CLEAN CATCH     Culture Result -      Escherichia coli  ,000 cfu/mL      Narrative:      Indication for culture:->Evaluation for sepsis without a  clear source of infection  Indication for culture:->Evaluation for sepsis without a    Susceptibility     Escherichia coli (1)     Antibiotic Interpretation Microscan Method Status    Amikacin  [*]  Sensitive <=16 mcg/mL DORITA Final    Ampicillin Resistant >16 mcg/mL DORITA Final    Amoxicillin/CA  [*]  Sensitive <=8/4 mcg/mL DORITA Final    Aztreonam  [*]  Sensitive <=4 mcg/mL DORITA Final     Ceftolozane+Tazobactam  [*]  Sensitive <=2 mcg/mL DORITA Final    Ceftriaxone Sensitive <=1 mcg/mL DORITA Final    Ceftazidime  [*]  Sensitive <=1 mcg/mL DORITA Final    Cefazolin Sensitive <=2 mcg/mL DORITA Final    Ciprofloxacin Intermediate 0.5 mcg/mL DORITA Final    Cefepime Sensitive <=2 mcg/mL DORITA Final    Cefuroxime Sensitive <=4 mcg/mL DORITA Final    Ampicillin/sulbactam Intermediate 16/8 mcg/mL DROITA Final    Ceftazidime+Avibactam  [*]  Sensitive <=4 mcg/mL DORITA Final    Tobramycin Sensitive <=2 mcg/mL DORITA Final    Ertapenem  [*]  Sensitive <=0.5 mcg/mL DORITA Final    Nitrofurantoin Sensitive <=32 mcg/mL DORITA Final    Gentamicin Sensitive <=2 mcg/mL DORITA Final    Imipenem  [*]  Sensitive <=1 mcg/mL DORITA Final    Levofloxacin Sensitive <=0.5 mcg/mL DORITA Final    Meropenem  [*]  Sensitive <=1 mcg/mL DORITA Final    Meropenem/Vaborbactam  [*]  Sensitive <=2 mcg/mL DORITA Final    Pip/Tazobactam Sensitive <=8 mcg/mL DORITA Final    Trimeth/Sulfa Resistant >2/38 mcg/mL DORITA Final    Tetracycline  [*]  Resistant >8 mcg/mL DORITA Final    Tigecycline Sensitive <=2 mcg/mL DORITA Final           [*]  Suppressed Antibiotic                 URINALYSIS,CULTURE IF INDICATED [[id number]]  (Abnormal) Collected: 10/05/21 1824    Order Status: Completed Specimen: Urine Updated: 10/05/21 1838     Color Yellow     Character Hazy     Specific Gravity 1.015     Ph 6.5     Glucose Negative mg/dL      Ketones Negative mg/dL      Protein Negative mg/dL      Bilirubin Negative     Nitrite Negative     Leukocyte Esterase Moderate     Occult Blood Negative     Micro Urine Req Microscopic    Narrative:      Indication for culture:->Patient WITHOUT an indwelling Castañeda  catheter in place with new onset of Dysuria, Frequency,  Urgency, and/or Suprapubic pain    URINE CULTURE(NEW) [[id number]] Collected: 10/05/21 0000    Order Status: Canceled           Plan:   Organism resistant to Bactrim. Given isolate is intermediate to ciprofloxacin, have concern levofloxacin may not be  adequate for treatment despite sensitive on panel. Recommend treatment with Augmentin for pyelonephritis treatment. Spoke with patient via phone. She states she is overall feeling better. Informed her of results and that Bactrim may not be sufficient to treat her infection. Advised she stop the Bactrim and start Augmentin. Patient verbalized understanding and did not have any other questions.    Rx for Augmentin 875 mg BID x7 days #14 tablets no refills sent to [facility] at [address] Jessica Trinh, PharmD  PGY2 Infectious Diseases Pharmacy Resident

## 2021-11-19 NOTE — ED NOTES
Pt discharged in stable condition. Pt was prescribed zofran, batrim by the physician. Pt instructed to take medication as prescribed, follow up with PCP, return to the ER if symptoms worsen. PIV removed, tip intact.      Unknown if ever smoked

## 2022-07-30 ENCOUNTER — HOSPITAL ENCOUNTER (EMERGENCY)
Facility: MEDICAL CENTER | Age: 27
End: 2022-07-31
Attending: EMERGENCY MEDICINE

## 2022-07-30 ENCOUNTER — APPOINTMENT (OUTPATIENT)
Dept: RADIOLOGY | Facility: MEDICAL CENTER | Age: 27
End: 2022-07-30
Attending: EMERGENCY MEDICINE

## 2022-07-30 VITALS
WEIGHT: 250 LBS | OXYGEN SATURATION: 95 % | BODY MASS INDEX: 40.35 KG/M2 | TEMPERATURE: 98.1 F | HEART RATE: 74 BPM | DIASTOLIC BLOOD PRESSURE: 92 MMHG | RESPIRATION RATE: 17 BRPM | SYSTOLIC BLOOD PRESSURE: 144 MMHG

## 2022-07-30 DIAGNOSIS — T65.893A TOXIC EFFECT OF PEPPER SPRAY, ASSAULT, INITIAL ENCOUNTER: ICD-10-CM
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
## 2022-07-30 LAB
ALBUMIN SERPL BCP-MCNC: 4.6 G/DL (ref 3.2–4.9)
ALBUMIN/GLOB SERPL: 1.1 G/DL
ALP SERPL-CCNC: 101 U/L (ref 30–99)
ALT SERPL-CCNC: 18 U/L (ref 2–50)
ANION GAP SERPL CALC-SCNC: 11 MMOL/L (ref 7–16)
APTT PPP: 29.4 SEC (ref 24.7–36)
AST SERPL-CCNC: 20 U/L (ref 12–45)
BASOPHILS # BLD AUTO: 0.7 % (ref 0–1.8)
BASOPHILS # BLD: 0.06 K/UL (ref 0–0.12)
BILIRUB SERPL-MCNC: 0.3 MG/DL (ref 0.1–1.5)
BUN SERPL-MCNC: 16 MG/DL (ref 8–22)
CALCIUM SERPL-MCNC: 9.3 MG/DL (ref 8.5–10.5)
CHLORIDE SERPL-SCNC: 104 MMOL/L (ref 96–112)
CO2 SERPL-SCNC: 26 MMOL/L (ref 20–33)
CREAT SERPL-MCNC: 0.83 MG/DL (ref 0.5–1.4)
EOSINOPHIL # BLD AUTO: 0.07 K/UL (ref 0–0.51)
EOSINOPHIL NFR BLD: 0.8 % (ref 0–6.9)
ERYTHROCYTE [DISTWIDTH] IN BLOOD BY AUTOMATED COUNT: 46.3 FL (ref 35.9–50)
GFR SERPLBLD CREATININE-BSD FMLA CKD-EPI: 98 ML/MIN/1.73 M 2
GLOBULIN SER CALC-MCNC: 4.1 G/DL (ref 1.9–3.5)
GLUCOSE SERPL-MCNC: 104 MG/DL (ref 65–99)
HCG SERPL QL: NEGATIVE
HCT VFR BLD AUTO: 44.4 % (ref 37–47)
HGB BLD-MCNC: 14.1 G/DL (ref 12–16)
IMM GRANULOCYTES # BLD AUTO: 0.03 K/UL (ref 0–0.11)
IMM GRANULOCYTES NFR BLD AUTO: 0.3 % (ref 0–0.9)
INR PPP: 0.99 (ref 0.87–1.13)
LYMPHOCYTES # BLD AUTO: 2.03 K/UL (ref 1–4.8)
LYMPHOCYTES NFR BLD: 22.5 % (ref 22–41)
MCH RBC QN AUTO: 26.2 PG (ref 27–33)
MCHC RBC AUTO-ENTMCNC: 31.8 G/DL (ref 33.6–35)
MCV RBC AUTO: 82.4 FL (ref 81.4–97.8)
MONOCYTES # BLD AUTO: 0.51 K/UL (ref 0–0.85)
MONOCYTES NFR BLD AUTO: 5.6 % (ref 0–13.4)
NEUTROPHILS # BLD AUTO: 6.33 K/UL (ref 2–7.15)
NEUTROPHILS NFR BLD: 70.1 % (ref 44–72)
NRBC # BLD AUTO: 0 K/UL
NRBC BLD-RTO: 0 /100 WBC
PLATELET # BLD AUTO: 374 K/UL (ref 164–446)
PMV BLD AUTO: 10.3 FL (ref 9–12.9)
POTASSIUM SERPL-SCNC: 3.8 MMOL/L (ref 3.6–5.5)
PROT SERPL-MCNC: 8.7 G/DL (ref 6–8.2)
PROTHROMBIN TIME: 13 SEC (ref 12–14.6)
RBC # BLD AUTO: 5.39 M/UL (ref 4.2–5.4)
SODIUM SERPL-SCNC: 141 MMOL/L (ref 135–145)
WBC # BLD AUTO: 9 K/UL (ref 4.8–10.8)

## 2022-07-30 PROCEDURE — 36415 COLL VENOUS BLD VENIPUNCTURE: CPT

## 2022-07-30 PROCEDURE — 85730 THROMBOPLASTIN TIME PARTIAL: CPT

## 2022-07-30 PROCEDURE — A9270 NON-COVERED ITEM OR SERVICE: HCPCS | Performed by: EMERGENCY MEDICINE

## 2022-07-30 PROCEDURE — 85610 PROTHROMBIN TIME: CPT

## 2022-07-30 PROCEDURE — 70450 CT HEAD/BRAIN W/O DYE: CPT

## 2022-07-30 PROCEDURE — 700101 HCHG RX REV CODE 250: Performed by: EMERGENCY MEDICINE

## 2022-07-30 PROCEDURE — 84703 CHORIONIC GONADOTROPIN ASSAY: CPT

## 2022-07-30 PROCEDURE — 70486 CT MAXILLOFACIAL W/O DYE: CPT

## 2022-07-30 PROCEDURE — 85025 COMPLETE CBC W/AUTO DIFF WBC: CPT

## 2022-07-30 PROCEDURE — 80053 COMPREHEN METABOLIC PANEL: CPT

## 2022-07-30 PROCEDURE — 700102 HCHG RX REV CODE 250 W/ 637 OVERRIDE(OP): Performed by: EMERGENCY MEDICINE

## 2022-07-30 PROCEDURE — 99284 EMERGENCY DEPT VISIT MOD MDM: CPT

## 2022-07-30 RX ORDER — PROPARACAINE HYDROCHLORIDE 5 MG/ML
1 SOLUTION/ DROPS OPHTHALMIC ONCE
Status: COMPLETED | OUTPATIENT
Start: 2022-07-30 | End: 2022-07-30

## 2022-07-30 RX ORDER — IBUPROFEN 600 MG/1
600 TABLET ORAL ONCE
Status: COMPLETED | OUTPATIENT
Start: 2022-07-30 | End: 2022-07-30

## 2022-07-30 RX ADMIN — IBUPROFEN 600 MG: 600 TABLET, FILM COATED ORAL at 23:00

## 2022-07-30 RX ADMIN — PROPARACAINE HYDROCHLORIDE 1 DROP: 5 SOLUTION/ DROPS OPHTHALMIC at 19:45

## 2022-07-30 RX ADMIN — FLUORESCEIN SODIUM 1 MG: 1 STRIP OPHTHALMIC at 19:45

## 2022-07-30 ASSESSMENT — FIBROSIS 4 INDEX: FIB4 SCORE: 0.28
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
## 2022-08-03 NOTE — ED TRIAGE NOTES
R flank pain x one week. Denies any pain with urination. Patient has not been vaccinated for covid and doesn't want the vaccinations.   15

## (undated) DEVICE — HEAD HOLDER JUNIOR/ADULT

## (undated) DEVICE — PACK C-SECTION (2EA/CA)

## (undated) DEVICE — PAD LAP STERILE 18 X 18 - (5/PK 40PK/CA)

## (undated) DEVICE — SUTURE 0 VICRYL PLUS CT 36 (36PK/BX)"

## (undated) DEVICE — LIGASURE SM JAW SEALER CRVD - (6EA/CA)

## (undated) DEVICE — SUTURE 2-0 CHROMIC GUT CT-1 27 (36PK/BX)"

## (undated) DEVICE — CATHETER IV NON-SAFETY 18 GA X 1 1/4 (50/BX 4BX/CA)

## (undated) DEVICE — DRESSING INTERCEED ABSORBABLE ADHESION BARRIER TC7 (10EA/CA)

## (undated) DEVICE — SODIUM CHL IRRIGATION 0.9% 1000ML (12EA/CA)

## (undated) DEVICE — SUTURE 1 CHROMIC CTX ETHICON - (36PK/BX)

## (undated) DEVICE — SUTURE 0 VICRYL PLUS CT-1 - 36 INCH (36/BX)

## (undated) DEVICE — CANISTER SUCTION 3000ML MECHANICAL FILTER AUTO SHUTOFF MEDI-VAC NONSTERILE LF DISP  (40EA/CA)

## (undated) DEVICE — GLOVE BIOGEL SZ 8 SURGICAL PF LTX - (50PR/BX 4BX/CA)

## (undated) DEVICE — STAPLER SKIN DISP - (6/BX 10BX/CA) VISISTAT

## (undated) DEVICE — TRAY SPINAL ANESTHESIA NON-SAFETY (10/CA)

## (undated) DEVICE — Device

## (undated) DEVICE — WATER IRRIGATION STERILE 1000ML (12EA/CA)

## (undated) DEVICE — SUTURE 3-0 VICRYL PLUS CT-1 - 36 INCH (36/BX)

## (undated) DEVICE — SLEEVE, SEQUENTIAL CALF REG

## (undated) DEVICE — TAPE CLOTH MEDIPORE 6 INCH - (12RL/CA)

## (undated) DEVICE — SET EXTENSION WITH 2 PORTS (48EA/CA) ***PART #2C8610 IS A SUBSTITUTE*****

## (undated) DEVICE — TUBING CLEARLINK DUO-VENT - C-FLO (48EA/CA)

## (undated) DEVICE — CHLORAPREP 26 ML APPLICATOR - ORANGE TINT(25/CA)

## (undated) DEVICE — KIT  I.V. START (100EA/CA)

## (undated) DEVICE — BLANKET UNDERBODY FULL ACCES - (5/CA)

## (undated) DEVICE — SUTURE 0 GUT-PLAIN (36PK/BX)

## (undated) DEVICE — DETERGENT RENUZYME PLUS 10 OZ PACKET (50/BX)

## (undated) DEVICE — ELECTRODE DUAL RETURN W/ CORD - (50/PK)